# Patient Record
Sex: MALE | Race: WHITE | Employment: FULL TIME | URBAN - METROPOLITAN AREA
[De-identification: names, ages, dates, MRNs, and addresses within clinical notes are randomized per-mention and may not be internally consistent; named-entity substitution may affect disease eponyms.]

---

## 2017-06-08 ENCOUNTER — OFFICE VISIT (OUTPATIENT)
Dept: INTERNAL MEDICINE CLINIC | Age: 57
End: 2017-06-08

## 2017-06-08 VITALS
TEMPERATURE: 98.1 F | HEART RATE: 88 BPM | BODY MASS INDEX: 32.35 KG/M2 | WEIGHT: 218.4 LBS | SYSTOLIC BLOOD PRESSURE: 128 MMHG | HEIGHT: 69 IN | RESPIRATION RATE: 16 BRPM | DIASTOLIC BLOOD PRESSURE: 88 MMHG | OXYGEN SATURATION: 97 %

## 2017-06-08 DIAGNOSIS — E11.8 TYPE 2 DIABETES MELLITUS WITH COMPLICATION, WITHOUT LONG-TERM CURRENT USE OF INSULIN (HCC): Primary | ICD-10-CM

## 2017-06-08 DIAGNOSIS — R10.30 LOWER ABDOMINAL PAIN: ICD-10-CM

## 2017-06-08 DIAGNOSIS — Z12.5 PROSTATE CANCER SCREENING: ICD-10-CM

## 2017-06-08 DIAGNOSIS — N53.12 PAINFUL EJACULATION: ICD-10-CM

## 2017-06-08 DIAGNOSIS — I10 ESSENTIAL HYPERTENSION: ICD-10-CM

## 2017-06-08 DIAGNOSIS — N41.1 CHRONIC PROSTATITIS: ICD-10-CM

## 2017-06-08 RX ORDER — ASPIRIN 325 MG
81 TABLET ORAL DAILY
COMMUNITY
End: 2019-03-07

## 2017-06-08 RX ORDER — METFORMIN HYDROCHLORIDE 500 MG/1
500 TABLET ORAL 2 TIMES DAILY WITH MEALS
Qty: 180 TAB | Refills: 2 | Status: SHIPPED | OUTPATIENT
Start: 2017-06-08 | End: 2017-06-12 | Stop reason: SDUPTHER

## 2017-06-08 RX ORDER — AMLODIPINE BESYLATE 5 MG/1
5 TABLET ORAL DAILY
Status: CANCELLED | OUTPATIENT
Start: 2017-06-08

## 2017-06-08 RX ORDER — METFORMIN HYDROCHLORIDE 500 MG/1
TABLET ORAL 2 TIMES DAILY WITH MEALS
COMMUNITY
End: 2017-06-08 | Stop reason: SDUPTHER

## 2017-06-08 RX ORDER — TAMSULOSIN HYDROCHLORIDE 0.4 MG/1
0.4 CAPSULE ORAL DAILY
Qty: 30 CAP | Refills: 1 | Status: SHIPPED | OUTPATIENT
Start: 2017-06-08 | End: 2017-08-03 | Stop reason: SDUPTHER

## 2017-06-08 RX ORDER — LISINOPRIL 10 MG/1
10 TABLET ORAL DAILY
Qty: 90 TAB | Refills: 1 | Status: SHIPPED | OUTPATIENT
Start: 2017-06-08 | End: 2017-06-12 | Stop reason: SDUPTHER

## 2017-06-08 RX ORDER — CIPROFLOXACIN 500 MG/1
500 TABLET ORAL 2 TIMES DAILY
Qty: 60 TAB | Refills: 1 | Status: SHIPPED | OUTPATIENT
Start: 2017-06-08 | End: 2017-07-20

## 2017-06-08 RX ORDER — AMLODIPINE BESYLATE 5 MG/1
5 TABLET ORAL DAILY
COMMUNITY
End: 2017-07-10

## 2017-06-08 NOTE — PROGRESS NOTES
HPI  Mr. Mynor Laboy is a 61y.o. year old male, he is seen today to establish care. Has h/o DM, HTN. Complains of intermittent pain in groin area, sometimes feels scrotum is enlarged, like \"bag of fluid\". No masses noted. Sometimes penis/testicles will hurt with intercourse/ejaculation. Seems to happen R>L. Has been happening in last 2 years. Thinks he may have passed a kidney stone in past.  Sounds like he may have had a CT for this in past.    No hematuria. May also have flank pain, intermittent, both right and left . No urine hesitancy, may have frequent urination. No groin bulge. No cp or sob. Not checking glucose lately - recently was about 100. Has had spells in past of low glucose - none recently. No constipation. Says has IBS - bloating at times. Not checking bp much. No n/v/abd pain, no melena or brbpr. Last colon about 8 years ago - told \"tiny polyp\" - repeat 10 years per patient. Chief Complaint   Patient presents with   [address]     Room [address]// Dr Zehra Fried, last saw approx 1.5 year ago// Fasting // had lipids checked 1 month ago at job health screening    Diabetes     need refills, not checking Glucose at home     Hypertension        Prior to Admission medications    Medication Sig Start Date End Date Taking? Authorizing Provider   metFORMIN (GLUCOPHAGE) 500 mg tablet Take  by mouth two (2) times daily (with meals). Yes Historical Provider   amLODIPine (NORVASC) 5 mg tablet Take 5 mg by mouth daily. Yes Historical Provider   aspirin (ASPIRIN) 325 mg tablet Take 325 mg by mouth daily. Yes Historical Provider         No Known Allergies      REVIEW OF SYSTEMS:  Per HPI    PHYSICAL EXAM:  Visit Vitals    /88    Pulse 88    Temp 98.1 °F (36.7 °C) (Oral)    Resp 16    Ht 5' 9\" (1.753 m)    Wt 218 lb 6.4 oz (99.1 kg)    SpO2 97%    BMI 32.25 kg/m2     Constitutional: Appears well-developed and well-nourished. No distress.    HENT:   Head: Normocephalic and atraumatic. Eyes: No scleral icterus. Ears: tm's wnl  Mouth: OP clear without lesions, no pharyngeal exudate  Neck: no lad, no tm, supple   Cardiovascular: Normal S1/S2, regular rhythm. No murmurs, rubs, or gallops. Pulmonary/Chest: Effort normal and breath sounds normal. No respiratory distress. No wheezes, rhonchi, or rales. Abdomen: Soft, NT/ND, +BS, no rebound or guarding, no masses, no HSM appreciated. : penis circumcised, no lesions, no testicular pain or masses palpated, no inguinal hernia b/l, prostate enlarged symmetrically without tenderness or masses  Ext: No edema. Neurological: Alert. Psychiatric: Normal mood and affect. Behavior is normal.    DM foot exam: 2+ pedal pulses, no lesions, sensation intact to monofilament b/l      No results found for: NA, K, CL, CO2, AGAP, GLU, BUN, CREA, BUCR, GFRAA, GFRNA, CA, TBIL, TBILI, GPT, SGOT, AP, TP, ALB, GLOB, AGRAT, ALT  No results found for: HBA1C, HGBE8, MOX7CMOW   No results found for: CHOL, CHOLPOCT, CHOLX, CHLST, CHOLV, HDL, HDLPOC, LDL, LDLCPOC, NLDLCT, DLDL, LDLC, DLDLP, VLDLC, VLDL, TGLX, TRIGL, TRIGP, TGLPOCT, CHHD, CHHDX       ASSESSMENT/PLAN  Jamil Donovan was seen today for establish care, diabetes and hypertension. Diagnoses and all orders for this visit:    Type 2 diabetes mellitus with complication, without long-term current use of insulin (HCC)   metFORMIN (GLUCOPHAGE) 500 mg tablet; Take 1 Tab by mouth two (2) times daily (with meals). -     LIPID PANEL  -     HEMOGLOBIN A1C WITH EAG  -     CBC WITH AUTOMATED DIFF  -     AMB POC URINE, MICROALBUMIN, SEMIQUANT (3 RESULTS)  -      DIABETES FOOT EXAM  Check labs, encouraged checking glucose more regularly and bring log to follow up  Essential hypertension  -     METABOLIC PANEL, COMPREHENSIVE   lisinopril (PRINIVIL, ZESTRIL) 10 mg tablet; Take 1 Tab by mouth daily.   Add lisinopril - potential SE discussed  Prostate cancer screening  -     PROSTATE SPECIFIC AG    Painful ejaculation  Suspect due to chronic prostatitis - plan below - see urology if not improvement or worsens  BMI 32.0-32.9,adult  Goal 30 min exercise 5 days per week   Lower abdominal pain  -     UA/M W/RFLX CULTURE, ROUTINE    Chronic prostatitis  -     tamsulosin (FLOMAX) 0.4 mg capsule; Take 1 Cap by mouth daily. -     ciprofloxacin HCl (CIPRO) 500 mg tablet; Take 1 Tab by mouth two (2) times a day for 42 days. Health Maintenance Due   Topic Date Due    Hepatitis C Screening  1960    MICROALBUMIN Q1  07/16/1969    EYE EXAM RETINAL OR DILATED Q1  07/16/1969    Pneumococcal 19-64 Medium Risk (1 of 1 - PPSV23) 07/16/1978    DTaP/Tdap/Td series (1 - Tdap) 07/16/1980    FOBT Q 1 YEAR AGE 50-75  07/16/2009        Follow-up Disposition:  Return in about 1 month (around 7/8/2017) for bp. Reviewed plan of care. Patient has provided input and agrees with goals. The nurse provided the patient and/or family with advanced directive information if needed and encouraged the patient to provide a copy to the office when available.

## 2017-06-08 NOTE — MR AVS SNAPSHOT
Visit Information Date & Time Provider Department Dept. Phone Encounter #  
 6/8/2017 11:00 AM William Ruiz MD [facility] [phone] [id number] Follow-up Instructions Return in about 1 month (around 7/8/2017) for bp. Routing History Upcoming Health Maintenance Date Due Hepatitis C Screening 1960 DTaP/Tdap/Td series (1 - Tdap) 7/16/1980 FOBT Q 1 YEAR AGE 50-75 7/16/2009 INFLUENZA AGE 9 TO ADULT 8/1/2017 Allergies as of 6/8/2017  Review Complete On: 6/8/2017 By: William Ruiz MD  
 No Known Allergies Current Immunizations  Never Reviewed No immunizations on file. Not reviewed this visit You Were Diagnosed With   
  
 Codes Comments Type 2 diabetes mellitus with complication, without long-term current use of insulin (HCC)    -  Primary ICD-10-CM: E11.8 ICD-9-CM: 250.90 Essential hypertension     ICD-10-CM: I10 
ICD-9-CM: 401.9 Prostate cancer screening     ICD-10-CM: Z12.5 ICD-9-CM: V76.44 Painful ejaculation     ICD-10-CM: N53.12 
ICD-9-CM: 608.89 BMI 32.0-32.9,adult     ICD-10-CM: X19.81 
ICD-9-CM: V85.32 Lower abdominal pain     ICD-10-CM: R10.30 ICD-9-CM: 789.09 Chronic prostatitis     ICD-10-CM: N41.1 ICD-9-CM: 601.1 Vitals BP Pulse Temp Resp Height(growth percentile) Weight(growth percentile) 128/88 88 98.1 °F (36.7 °C) (Oral) 16 5' 9\" (1.753 m) 218 lb 6.4 oz (99.1 kg) SpO2 BMI Smoking Status 97% 32.25 kg/m2 Never Smoker Vitals History BMI and BSA Data Body Mass Index Body Surface Area  
 32.25 kg/m 2 2.2 m 2 Preferred Pharmacy Pharmacy Name Phone [id number] Jacquie Connor [phone] Your Updated Medication List  
  
   
This list is accurate as of: 6/8/17 11:53 AM.  Always use your most recent med list. amLODIPine 5 mg tablet Commonly known as:  Christy Fiordaliza Take 5 mg by mouth daily. aspirin 325 mg tablet Commonly known as:  ASPIRIN Take 325 mg by mouth daily. ciprofloxacin HCl 500 mg tablet Commonly known as:  CIPRO Take 1 Tab by mouth two (2) times a day for 42 days. lisinopril 10 mg tablet Commonly known as:  Melissa Barrier Take 1 Tab by mouth daily. metFORMIN 500 mg tablet Commonly known as:  GLUCOPHAGE Take 1 Tab by mouth two (2) times daily (with meals). tamsulosin 0.4 mg capsule Commonly known as:  FLOMAX Take 1 Cap by mouth daily. Prescriptions Sent to Pharmacy Refills  
 metFORMIN (GLUCOPHAGE) 500 mg tablet 2 Sig: Take 1 Tab by mouth two (2) times daily (with meals). Class: Normal  
 Pharmacy:  [address] Ph #: [phone] Route: Oral  
 lisinopril (PRINIVIL, ZESTRIL) 10 mg tablet 1 Sig: Take 1 Tab by mouth daily. Class: Normal  
 Pharmacy:  [address] Ph #: [phone] Route: Oral  
 tamsulosin (FLOMAX) 0.4 mg capsule 1 Sig: Take 1 Cap by mouth daily. Class: Normal  
 Pharmacy: [facility] #[id number] - [employer], [address] [facility] Ph #: [phone] Route: Oral  
 ciprofloxacin HCl (CIPRO) 500 mg tablet 1 Sig: Take 1 Tab by mouth two (2) times a day for 42 days. Class: Normal  
 Pharmacy: [facility]/pharmacy #[id number] - [employer], [address] [place] [address] Ph #: [phone] Route: Oral  
  
We Performed the Following AMB POC URINE, MICROALBUMIN, SEMIQUANT (3 RESULTS) [58971 CPT(R)] CBC WITH AUTOMATED DIFF [64568 CPT(R)] HEMOGLOBIN A1C WITH EAG [31044 CPT(R)]  DIABETES FOOT EXAM [7 Custom] LIPID PANEL [65254 CPT(R)] METABOLIC PANEL, COMPREHENSIVE [47795 CPT(R)] PROSTATE SPECIFIC AG (PSA) [id number] CPT(R)] UA/M W/RFLX CULTURE, ROUTINE [[id number] Custom] Follow-up Instructions Return in about 1 month (around 7/8/2017) for bp. Patient Instructions Prostatitis: Care Instructions Your Care Instructions The prostate gland is a small, walnut-shaped organ. It lies just below a man's bladder. It surrounds the urethra, the tube that carries urine through the penis and out of the body. Prostatitis is a painful condition caused by inflammation or infection of the prostate gland. Sometimes the condition is caused by bacteria, but often the cause is not known. Prostatitis caused by bacteria usually is treated with self-care and antibiotics. Follow-up care is a key part of your treatment and safety. Be sure to make and go to all appointments, and call your doctor if you are having problems. It's also a good idea to know your test results and keep a list of the medicines you take. How can you care for yourself at home? · If your doctor prescribed antibiotics, take them as directed. Do not stop taking them just because you feel better. You need to take the full course of antibiotics. · Take an over-the-counter pain medicine, such as acetaminophen (Tylenol), ibuprofen (Advil, Motrin), or naproxen (Aleve). Be safe with medicines. Read and follow all instructions on the label. · Take warm baths to help soothe pain. · Straining to pass stools can hurt when your prostate is inflamed. Avoid constipation. ¨ Include fruits, vegetables, beans, and whole grains in your diet each day. These foods are high in fiber. ¨ Drink plenty of fluids, enough so that your urine is light yellow or clear like water. If you have kidney, heart, or liver disease and have to limit fluids, talk with your doctor before you increase the amount of fluids you drink. ¨ Get some exercise every day. Build up slowly to 30 to 60 minutes a day on 5 or more days of the week. ¨ Take a fiber supplement, such as Citrucel or Metamucil, every day if needed. Read and follow all instructions on the label. ¨ Schedule time each day for a bowel movement. Having a daily routine may help. Take your time and do not strain when having a bowel movement. · Avoid alcohol, caffeine, and spicy foods, especially if they make your symptoms worse. When should you call for help? Call your doctor now or seek immediate medical care if: 
· You have symptoms of a urinary infection. For example: ¨ You have blood or pus in your urine. ¨ You have pain in your back just below your rib cage. This is called flank pain. ¨ You have a fever, chills, or body aches. ¨ It hurts to urinate. ¨ You have groin or belly pain. Watch closely for changes in your health, and be sure to contact your doctor if: 
· You have trouble starting to urinate or cannot empty your bladder completely. · You do not get better as expected. Where can you learn more? Go to http://kerrie-renny.info/. Enter E986 in the search box to learn more about \"Prostatitis: Care Instructions. \" Current as of: May 24, 2016 Content Version: 11.2 © 7154-8209 [employer]. Care instructions adapted under license by [employer] (which disclaims liability or warranty for this information). If you have questions about a medical condition or this instruction, always ask your healthcare professional. [address] any warranty or liability for your use of this information. Introducing [facility] HEALTH [facility]! [facility] introduces FraudMetrix patient portal. Now you can access parts of your medical record, email your doctor's office, and request medication refills online. 1. In your internet browser, go to [email or web address] 2. Click on the First Time User? Click Here link in the Sign In box. You will see the New Member Sign Up page. 3. Enter your [employer]udMet[employer] Access Code exactly as it appears below. You will not need to use this code after youve completed the sign-up process.  If you do not sign up before the expiration date, you must request a new code. · [email or web address] Access Code: [email or web address] Expires: 9/6/2017 11:53 AM 
 
4. Enter the last four digits of your Social Security Number (xxxx) and Date of Birth (mm/dd/yyyy) as indicated and click Submit. You will be taken to the next sign-up page. 5. Create a [email or web address] ID. This will be your [email or web address] login ID and cannot be changed, so think of one that is secure and easy to remember. 6. Create a [email or web address] password. You can change your password at any time. 7. Enter your Password Reset Question and Answer. This can be used at a later time if you forget your password. 8. Enter your e-mail address. You will receive e-mail notification when new information is available in [address] 9. Click Sign Up. You can now view and download portions of your medical record. 10. Click the Download Summary menu link to download a portable copy of your medical information. If you have questions, please visit the Frequently Asked Questions section of the [email or web address] website. Remember, [email or web address] is NOT to be used for urgent needs. For medical emergencies, dial 911. Now available from your iPhone and Android! Please provide this summary of care documentation to your next provider. Your primary care clinician is listed as Marleni Coker. If you have any questions after today's visit, please call [phone].

## 2017-06-08 NOTE — PATIENT INSTRUCTIONS
Prostatitis: Care Instructions  Your Care Instructions    The prostate gland is a small, walnut-shaped organ. It lies just below a man's bladder. It surrounds the urethra, the tube that carries urine through the penis and out of the body. Prostatitis is a painful condition caused by inflammation or infection of the prostate gland. Sometimes the condition is caused by bacteria, but often the cause is not known. Prostatitis caused by bacteria usually is treated with self-care and antibiotics. Follow-up care is a key part of your treatment and safety. Be sure to make and go to all appointments, and call your doctor if you are having problems. It's also a good idea to know your test results and keep a list of the medicines you take. How can you care for yourself at home? · If your doctor prescribed antibiotics, take them as directed. Do not stop taking them just because you feel better. You need to take the full course of antibiotics. · Take an over-the-counter pain medicine, such as acetaminophen (Tylenol), ibuprofen (Advil, Motrin), or naproxen (Aleve). Be safe with medicines. Read and follow all instructions on the label. · Take warm baths to help soothe pain. · Straining to pass stools can hurt when your prostate is inflamed. Avoid constipation. ¨ Include fruits, vegetables, beans, and whole grains in your diet each day. These foods are high in fiber. ¨ Drink plenty of fluids, enough so that your urine is light yellow or clear like water. If you have kidney, heart, or liver disease and have to limit fluids, talk with your doctor before you increase the amount of fluids you drink. ¨ Get some exercise every day. Build up slowly to 30 to 60 minutes a day on 5 or more days of the week. ¨ Take a fiber supplement, such as Citrucel or Metamucil, every day if needed. Read and follow all instructions on the label. ¨ Schedule time each day for a bowel movement. Having a daily routine may help.  Take your time and do not strain when having a bowel movement. · Avoid alcohol, caffeine, and spicy foods, especially if they make your symptoms worse. When should you call for help? Call your doctor now or seek immediate medical care if:  · You have symptoms of a urinary infection. For example:  ¨ You have blood or pus in your urine. ¨ You have pain in your back just below your rib cage. This is called flank pain. ¨ You have a fever, chills, or body aches. ¨ It hurts to urinate. ¨ You have groin or belly pain. Watch closely for changes in your health, and be sure to contact your doctor if:  · You have trouble starting to urinate or cannot empty your bladder completely. · You do not get better as expected. Where can you learn more? Go to http://kerrie-renny.info/. Enter N876 in the search box to learn more about \"Prostatitis: Care Instructions. \"  Current as of: May 24, 2016  Content Version: 11.2  © 7440-0613 [employer]. Care instructions adapted under license by [employer] (which disclaims liability or warranty for this information). If you have questions about a medical condition or this instruction, always ask your healthcare professional. [place] [address] any warranty or liability for your use of this information.

## 2017-06-09 DIAGNOSIS — E11.8 TYPE 2 DIABETES MELLITUS WITH COMPLICATION, WITHOUT LONG-TERM CURRENT USE OF INSULIN (HCC): ICD-10-CM

## 2017-06-09 DIAGNOSIS — I10 ESSENTIAL HYPERTENSION: ICD-10-CM

## 2017-06-09 LAB
ALBUMIN SERPL-MCNC: 4.9 G/DL (ref 3.5–5.5)
ALBUMIN/GLOB SERPL: 1.7 {RATIO} (ref 1.2–2.2)
ALP SERPL-CCNC: 137 IU/L (ref 39–117)
ALT SERPL-CCNC: 25 IU/L (ref 0–44)
APPEARANCE UR: CLEAR
AST SERPL-CCNC: 19 IU/L (ref 0–40)
BACTERIA #/AREA URNS HPF: NORMAL /[HPF]
BASOPHILS # BLD AUTO: 0 X10E3/UL (ref 0–0.2)
BASOPHILS NFR BLD AUTO: 0 %
BILIRUB SERPL-MCNC: 0.6 MG/DL (ref 0–1.2)
BILIRUB UR QL STRIP: NEGATIVE
BUN SERPL-MCNC: 14 MG/DL (ref 6–24)
BUN/CREAT SERPL: 18 (ref 9–20)
CALCIUM SERPL-MCNC: 9.6 MG/DL (ref 8.7–10.2)
CASTS URNS QL MICRO: NORMAL /LPF
CHLORIDE SERPL-SCNC: 98 MMOL/L (ref 96–106)
CHOLEST SERPL-MCNC: 210 MG/DL (ref 100–199)
CO2 SERPL-SCNC: 26 MMOL/L (ref 18–29)
COLOR UR: YELLOW
CREAT SERPL-MCNC: 0.78 MG/DL (ref 0.76–1.27)
EOSINOPHIL # BLD AUTO: 0.1 X10E3/UL (ref 0–0.4)
EOSINOPHIL NFR BLD AUTO: 2 %
EPI CELLS #/AREA URNS HPF: NORMAL /HPF
ERYTHROCYTE [DISTWIDTH] IN BLOOD BY AUTOMATED COUNT: 14 % (ref 12.3–15.4)
EST. AVERAGE GLUCOSE BLD GHB EST-MCNC: 169 MG/DL
GLOBULIN SER CALC-MCNC: 2.9 G/DL (ref 1.5–4.5)
GLUCOSE SERPL-MCNC: 115 MG/DL (ref 65–99)
GLUCOSE UR QL: NEGATIVE
HBA1C MFR BLD: 7.5 % (ref 4.8–5.6)
HCT VFR BLD AUTO: 49.1 % (ref 37.5–51)
HDLC SERPL-MCNC: 45 MG/DL
HGB BLD-MCNC: 16.5 G/DL (ref 12.6–17.7)
HGB UR QL STRIP: NEGATIVE
IMM GRANULOCYTES # BLD: 0 X10E3/UL (ref 0–0.1)
IMM GRANULOCYTES NFR BLD: 0 %
INTERPRETATION, 910389: NORMAL
KETONES UR QL STRIP: ABNORMAL
LDLC SERPL CALC-MCNC: 140 MG/DL (ref 0–99)
LEUKOCYTE ESTERASE UR QL STRIP: NEGATIVE
LYMPHOCYTES # BLD AUTO: 1.5 X10E3/UL (ref 0.7–3.1)
LYMPHOCYTES NFR BLD AUTO: 30 %
Lab: NORMAL
MCH RBC QN AUTO: 31.7 PG (ref 26.6–33)
MCHC RBC AUTO-ENTMCNC: 33.6 G/DL (ref 31.5–35.7)
MCV RBC AUTO: 94 FL (ref 79–97)
MICRO URNS: ABNORMAL
MICRO URNS: ABNORMAL
MONOCYTES # BLD AUTO: 0.6 X10E3/UL (ref 0.1–0.9)
MONOCYTES NFR BLD AUTO: 11 %
MUCOUS THREADS URNS QL MICRO: PRESENT
NEUTROPHILS # BLD AUTO: 2.9 X10E3/UL (ref 1.4–7)
NEUTROPHILS NFR BLD AUTO: 57 %
NITRITE UR QL STRIP: NEGATIVE
PH UR STRIP: 7 [PH] (ref 5–7.5)
PLATELET # BLD AUTO: 195 X10E3/UL (ref 150–379)
POTASSIUM SERPL-SCNC: 4.3 MMOL/L (ref 3.5–5.2)
PROT SERPL-MCNC: 7.8 G/DL (ref 6–8.5)
PROT UR QL STRIP: NEGATIVE
PSA SERPL-MCNC: 0.6 NG/ML (ref 0–4)
RBC # BLD AUTO: 5.21 X10E6/UL (ref 4.14–5.8)
RBC #/AREA URNS HPF: NORMAL /HPF
SODIUM SERPL-SCNC: 140 MMOL/L (ref 134–144)
SP GR UR: 1.02 (ref 1–1.03)
TRIGL SERPL-MCNC: 125 MG/DL (ref 0–149)
URINALYSIS REFLEX, 377202: ABNORMAL
UROBILINOGEN UR STRIP-MCNC: 0.2 MG/DL (ref 0.2–1)
VLDLC SERPL CALC-MCNC: 25 MG/DL (ref 5–40)
WBC # BLD AUTO: 5.2 X10E3/UL (ref 3.4–10.8)
WBC #/AREA URNS HPF: NORMAL /HPF

## 2017-06-09 NOTE — TELEPHONE ENCOUNTER
Pt asked us to send medication at yesterdays visit to [facility] mail order. Return fax from [facility] stating they are unable to locate pt in their system. Pt called and advised to call [facility] in order to set up his account with them. Pt in agreement. Pt states he believes Dr María Mosqueda was going to give him a Dermatology referral yesterday and wanted to confirm that. Please advise.

## 2017-06-12 RX ORDER — LISINOPRIL 10 MG/1
10 TABLET ORAL DAILY
Qty: 90 TAB | Refills: 1 | Status: SHIPPED | OUTPATIENT
Start: 2017-06-12 | End: 2017-07-10 | Stop reason: SINTOL

## 2017-06-12 RX ORDER — METFORMIN HYDROCHLORIDE 500 MG/1
1000 TABLET ORAL 2 TIMES DAILY WITH MEALS
Qty: 360 TAB | Refills: 2 | Status: SHIPPED | OUTPATIENT
Start: 2017-06-12 | End: 2018-03-09 | Stop reason: SDUPTHER

## 2017-06-12 NOTE — TELEPHONE ENCOUNTER
Tell him new dose metformin based on uncontrolled dm. a1c should be less than 7. Make sure he is seen in about 3 mos. Refer to Dr. Chelle Carballo.

## 2017-06-12 NOTE — TELEPHONE ENCOUNTER
Pt wife called and stated that pt's prescriptions didn't show up on [facility] account from 6/8. Pt would now like to have prescriptions sent to [facility] in [address] so they may get them quicker.

## 2017-07-10 ENCOUNTER — OFFICE VISIT (OUTPATIENT)
Dept: INTERNAL MEDICINE CLINIC | Age: 57
End: 2017-07-10

## 2017-07-10 VITALS
TEMPERATURE: 97.8 F | WEIGHT: 221.4 LBS | OXYGEN SATURATION: 96 % | BODY MASS INDEX: 32.79 KG/M2 | RESPIRATION RATE: 16 BRPM | DIASTOLIC BLOOD PRESSURE: 80 MMHG | HEIGHT: 69 IN | SYSTOLIC BLOOD PRESSURE: 128 MMHG | HEART RATE: 94 BPM

## 2017-07-10 DIAGNOSIS — I10 ESSENTIAL HYPERTENSION: ICD-10-CM

## 2017-07-10 DIAGNOSIS — Z11.59 ENCOUNTER FOR HEPATITIS C SCREENING TEST FOR LOW RISK PATIENT: ICD-10-CM

## 2017-07-10 DIAGNOSIS — E11.8 TYPE 2 DIABETES MELLITUS WITH COMPLICATION, WITHOUT LONG-TERM CURRENT USE OF INSULIN (HCC): Primary | ICD-10-CM

## 2017-07-10 DIAGNOSIS — N41.1 CHRONIC PROSTATITIS: ICD-10-CM

## 2017-07-10 RX ORDER — LOSARTAN POTASSIUM 25 MG/1
25 TABLET ORAL DAILY
Qty: 30 TAB | Refills: 2 | Status: SHIPPED | OUTPATIENT
Start: 2017-07-10 | End: 2017-09-05 | Stop reason: SDUPTHER

## 2017-07-10 NOTE — MR AVS SNAPSHOT
Visit Information Date & Time Provider Department Dept. Phone Encounter #  
 7/10/2017  8:45 AM Jose Madera MD [facility] [phone] [id number] Follow-up Instructions Return in about 2 months (around 9/10/2017) for bp, dm. Upcoming Health Maintenance Date Due Hepatitis C Screening 1960 MICROALBUMIN Q1 7/16/1969 EYE EXAM RETINAL OR DILATED Q1 7/16/1969 Pneumococcal 19-64 Medium Risk (1 of 1 - PPSV23) 7/16/1978 DTaP/Tdap/Td series (1 - Tdap) 7/16/1980 FOBT Q 1 YEAR AGE 50-75 7/16/2009 INFLUENZA AGE 9 TO ADULT 8/1/2017 HEMOGLOBIN A1C Q6M 12/8/2017 FOOT EXAM Q1 6/8/2018 LIPID PANEL Q1 6/8/2018 Allergies as of 7/10/2017  Review Complete On: 7/10/2017 By: Jose Madera MD  
 No Known Allergies Current Immunizations  Never Reviewed No immunizations on file. Not reviewed this visit You Were Diagnosed With   
  
 Codes Comments Type 2 diabetes mellitus with complication, without long-term current use of insulin (HCC)    -  Primary ICD-10-CM: E11.8 ICD-9-CM: 250.90 Essential hypertension     ICD-10-CM: I10 
ICD-9-CM: 401.9 Encounter for hepatitis C screening test for low risk patient     ICD-10-CM: Z11.59 
ICD-9-CM: V73.89 Chronic prostatitis     ICD-10-CM: N41.1 ICD-9-CM: 601.1 Vitals BP Pulse Temp Resp Height(growth percentile) Weight(growth percentile) 128/80 (BP 1 Location: Right arm, BP Patient Position: Sitting) 94 97.8 °F (36.6 °C) (Oral) 16 5' 9\" (1.753 m) 221 lb 6.4 oz (100.4 kg) SpO2 BMI Smoking Status 96% 32.7 kg/m2 Never Smoker Vitals History BMI and BSA Data Body Mass Index Body Surface Area 32.7 kg/m 2 2.21 m 2 Preferred Pharmacy Pharmacy Name Phone [id number] Jacquie Renee [phone] Your Updated Medication List  
  
   
 This list is accurate as of: 7/10/17  9:00 AM.  Always use your most recent med list.  
  
  
  
  
 aspirin 325 mg tablet Commonly known as:  ASPIRIN Take 325 mg by mouth daily. ciprofloxacin HCl 500 mg tablet Commonly known as:  CIPRO Take 1 Tab by mouth two (2) times a day for 42 days. losartan 25 mg tablet Commonly known as:  COZAAR Take 1 Tab by mouth daily. metFORMIN 500 mg tablet Commonly known as:  GLUCOPHAGE Take 2 Tabs by mouth two (2) times daily (with meals). tamsulosin 0.4 mg capsule Commonly known as:  FLOMAX Take 1 Cap by mouth daily. Prescriptions Sent to Pharmacy Refills  
 losartan (COZAAR) 25 mg tablet 2 Sig: Take 1 Tab by mouth daily. Class: Normal  
 Pharmacy: [facility]/pharmacy #[id number]  [place], [address] [place] [address] #: [phone] Route: Oral  
  
We Performed the Following AMB POC URINE, MICROALBUMIN, SEMIQUANT (3 RESULTS) [68940 CPT(R)] Follow-up Instructions Return in about 2 months (around 9/10/2017) for bp, dm. Introducing [facility]! Dear Génesis Childs: Thank you for requesting a [employer] account. Our records indicate that you already have an active [employer] account. You can access your account anytime at [email or web address] Did you know that you can access your hospital and ER discharge instructions at any time in [employer]ler? You can also review all of your test results from your hospital stay or ER visit. Additional Information If you have questions, please visit the Frequently Asked Questions section of the [employer] website at [email or web address] Remember, Grant[employer]ler is NOT to be used for urgent needs. For medical emergencies, dial 911. Now available from your iPhone and Android! Please provide this summary of care documentation to your next provider. Your primary care clinician is listed as Marleni Coker. If you have any questions after today's visit, please call [phone].

## 2017-07-10 NOTE — PROGRESS NOTES
HPI  Mr. Josey Wilks is a 61y.o. year old male, he is seen today for follow up HTN after addition of lisinopril, also chronic prostatitis. Symptoms of testicular pain have improved, much less frequent since being on ciprofloxacin. Notes at times has decreased libido - but not changed from spells in past.  No n/v/abd pain. No dysuria. Has checked glucose few times - has been about 100-130. Taking metformin 500mg bid. Last visit was only taking once daily. No cp or sob, no dizziness or weakness. Stopped amlodipine b/c he didn't think he needed 2 BP meds. Chief Complaint   Patient presents with    Hypertension     Room 2// NON fasting // not taking amlodipine// coughing with Lisinopril    Other     continue cipro for prostatitis?// last cipro yesterday// has not filled refill         Prior to Admission medications    Medication Sig Start Date End Date Taking? Authorizing Provider   lisinopril (PRINIVIL, ZESTRIL) 10 mg tablet Take 1 Tab by mouth daily. 6/12/17  Yes Jose Madera MD   metFORMIN (GLUCOPHAGE) 500 mg tablet Take 2 Tabs by mouth two (2) times daily (with meals). 6/12/17  Yes Jose Madera MD   aspirin (ASPIRIN) 325 mg tablet Take 325 mg by mouth daily. Yes Historical Provider   amLODIPine (NORVASC) 5 mg tablet Take 5 mg by mouth daily. Historical Provider   tamsulosin (FLOMAX) 0.4 mg capsule Take 1 Cap by mouth daily. 6/8/17   Jose Madera MD   ciprofloxacin HCl (CIPRO) 500 mg tablet Take 1 Tab by mouth two (2) times a day for 42 days. 6/8/17 7/20/17  Jose Madera MD         No Known Allergies      REVIEW OF SYSTEMS:  Per HPI    PHYSICAL EXAM:  Visit Vitals    /80 (BP 1 Location: Right arm, BP Patient Position: Sitting)    Pulse 94    Temp 97.8 °F (36.6 °C) (Oral)    Resp 16    Ht 5' 9\" (1.753 m)    Wt 221 lb 6.4 oz (100.4 kg)  Comment: steel toe boots    SpO2 96%    BMI 32.7 kg/m2     Constitutional: Appears well-developed and well-nourished.  No distress. HENT:   Head: Normocephalic and atraumatic. Eyes: No scleral icterus. Cardiovascular: Normal S1/S2, regular rhythm. No murmurs, rubs, or gallops. Pulmonary/Chest: Effort normal and breath sounds normal. No respiratory distress. No wheezes, rhonchi, or rales. Neurological: Alert. Psychiatric: Normal mood and affect. Behavior is normal.     Lab Results   Component Value Date/Time    Sodium 140 06/08/2017 02:57 PM    Potassium 4.3 06/08/2017 02:57 PM    Chloride 98 06/08/2017 02:57 PM    CO2 26 06/08/2017 02:57 PM    Glucose 115 06/08/2017 02:57 PM    BUN 14 06/08/2017 02:57 PM    Creatinine 0.78 06/08/2017 02:57 PM    BUN/Creatinine ratio 18 06/08/2017 02:57 PM    GFR est  06/08/2017 02:57 PM    GFR est non- 06/08/2017 02:57 PM    Calcium 9.6 06/08/2017 02:57 PM    Bilirubin, total 0.6 06/08/2017 02:57 PM    AST (SGOT) 19 06/08/2017 02:57 PM    Alk. phosphatase 137 06/08/2017 02:57 PM    Protein, total 7.8 06/08/2017 02:57 PM    Albumin 4.9 06/08/2017 02:57 PM    A-G Ratio 1.7 06/08/2017 02:57 PM    ALT (SGPT) 25 06/08/2017 02:57 PM     Lab Results   Component Value Date/Time    Hemoglobin A1c 7.5 06/08/2017 02:57 PM      Lab Results   Component Value Date/Time    Cholesterol, total 210 06/08/2017 02:57 PM    HDL Cholesterol 45 06/08/2017 02:57 PM    LDL, calculated 140 06/08/2017 02:57 PM    VLDL, calculated 25 06/08/2017 02:57 PM    Triglyceride 125 06/08/2017 02:57 PM          ASSESSMENT/PLAN  Morro Billings was seen today for hypertension and other. Diagnoses and all orders for this visit:    Type 2 diabetes mellitus with complication, without long-term current use of insulin (HCC)  -     AMB POC URINE, MICROALBUMIN, SEMIQUANT (3 RESULTS)  Controlled by history, continue metformin 500mg bid - poc a1c at follow up  Essential hypertension  -     losartan (COZAAR) 25 mg tablet; Take 1 Tab by mouth daily.   bp well controlled but cough with lisinopril - try losartan - bmp at follow up  Encounter for hepatitis C screening test for low risk patient  Will get hep c at follow up    Chronic prostatitis  Has 2 more weeks of cipro to complete - if symptoms don't completely resolve will need to see urology          Health Maintenance Due   Topic Date Due    Hepatitis C Screening  1960    MICROALBUMIN Q1  07/16/1969    EYE EXAM RETINAL OR DILATED Q1  07/16/1969    Pneumococcal 19-64 Medium Risk (1 of 1 - PPSV23) 07/16/1978    DTaP/Tdap/Td series (1 - Tdap) 07/16/1980    FOBT Q 1 YEAR AGE 50-75  07/16/2009        Follow-up Disposition:  Return in about 2 months (around 9/10/2017) for bp, dm. Reviewed plan of care. Patient has provided input and agrees with goals. The nurse provided the patient and/or family with advanced directive information if needed and encouraged the patient to provide a copy to the office when available.

## 2017-07-10 NOTE — PROGRESS NOTES
Patient received paperwork for advance directive during previous visit but has not completed at this time     Reviewed record In preparation for visit, huddled with provider and have obtained necessary documentation      1. Have you been to the ER, urgent care clinic since your last visit? Hospitalized since your last visit? NO    2. Have you seen or consulted any other health care providers outside of the [facility] since your last visit?    Dermatology/unsure of name     Dr Marino Beckman notified of reason for visit and vitals

## 2017-07-11 LAB
ALBUMIN UR QL STRIP: 30 MG/L
CREATININE, URINE POC: 300 MG/DL
MICROALBUMIN/CREAT RATIO POC: <30 MG/G

## 2017-08-03 DIAGNOSIS — N41.1 CHRONIC PROSTATITIS: ICD-10-CM

## 2017-08-03 RX ORDER — TAMSULOSIN HYDROCHLORIDE 0.4 MG/1
CAPSULE ORAL
Qty: 30 CAP | Refills: 1 | Status: SHIPPED | OUTPATIENT
Start: 2017-08-03 | End: 2018-05-03

## 2017-09-05 DIAGNOSIS — I10 ESSENTIAL HYPERTENSION: ICD-10-CM

## 2017-09-05 RX ORDER — LOSARTAN POTASSIUM 25 MG/1
25 TABLET ORAL DAILY
Qty: 90 TAB | Refills: 2 | Status: SHIPPED | OUTPATIENT
Start: 2017-09-05 | End: 2018-05-03 | Stop reason: SDUPTHER

## 2017-09-05 NOTE — TELEPHONE ENCOUNTER
From: Akila Calvert  To: Vasiliy Moseley MD  Sent: 9/5/2017 9:08 AM EDT  Subject: Medication Renewal Request    Original authorizing provider: MD Akila Wallace would like a refill of the following medications:  losartan (COZAAR) 25 mg tablet Vasiliy Moseley MD]    Preferred pharmacy: [id number] - [address]    Comment:  Can you send a 3 month prescription for Losartan to [facility]? He is completely out and they want him to start getting the 3 month mail prescriptions.

## 2017-12-05 ENCOUNTER — OFFICE VISIT (OUTPATIENT)
Dept: INTERNAL MEDICINE CLINIC | Age: 57
End: 2017-12-05

## 2017-12-05 ENCOUNTER — HOSPITAL ENCOUNTER (OUTPATIENT)
Dept: CT IMAGING | Age: 57
Discharge: HOME | End: 2017-12-05
Attending: INTERNAL MEDICINE
Payer: COMMERCIAL

## 2017-12-05 VITALS
OXYGEN SATURATION: 95 % | BODY MASS INDEX: 32.11 KG/M2 | WEIGHT: 216.8 LBS | DIASTOLIC BLOOD PRESSURE: 90 MMHG | SYSTOLIC BLOOD PRESSURE: 124 MMHG | HEIGHT: 69 IN | HEART RATE: 107 BPM | RESPIRATION RATE: 14 BRPM | TEMPERATURE: 98.1 F

## 2017-12-05 DIAGNOSIS — R10.13 EPIGASTRIC PAIN: ICD-10-CM

## 2017-12-05 DIAGNOSIS — R10.84 GENERALIZED ABDOMINAL PAIN: ICD-10-CM

## 2017-12-05 DIAGNOSIS — R10.32 LLQ ABDOMINAL PAIN: ICD-10-CM

## 2017-12-05 DIAGNOSIS — M54.50 ACUTE BILATERAL LOW BACK PAIN WITHOUT SCIATICA: ICD-10-CM

## 2017-12-05 DIAGNOSIS — I10 ESSENTIAL HYPERTENSION: ICD-10-CM

## 2017-12-05 DIAGNOSIS — R10.84 GENERALIZED ABDOMINAL PAIN: Primary | ICD-10-CM

## 2017-12-05 LAB
BILIRUB UR QL STRIP: NEGATIVE
CREAT BLD-MCNC: 0.8 MG/DL (ref 0.6–1.3)
GLUCOSE UR-MCNC: NEGATIVE MG/DL
KETONES P FAST UR STRIP-MCNC: NEGATIVE MG/DL
PH UR STRIP: 6 [PH] (ref 4.6–8)
PROT UR QL STRIP: NEGATIVE
SP GR UR STRIP: 1.01 (ref 1–1.03)
UA UROBILINOGEN AMB POC: NORMAL (ref 0.2–1)
URINALYSIS CLARITY POC: CLEAR
URINALYSIS COLOR POC: YELLOW
URINE BLOOD POC: NORMAL
URINE LEUKOCYTES POC: NEGATIVE
URINE NITRITES POC: NEGATIVE

## 2017-12-05 PROCEDURE — 74011636320 HC RX REV CODE- 636/320: Performed by: INTERNAL MEDICINE

## 2017-12-05 PROCEDURE — 82565 ASSAY OF CREATININE: CPT

## 2017-12-05 PROCEDURE — 74177 CT ABD & PELVIS W/CONTRAST: CPT

## 2017-12-05 PROCEDURE — 74011000255 HC RX REV CODE- 255: Performed by: INTERNAL MEDICINE

## 2017-12-05 PROCEDURE — 74011250636 HC RX REV CODE- 250/636: Performed by: INTERNAL MEDICINE

## 2017-12-05 RX ORDER — BARIUM SULFATE 20 MG/ML
900 SUSPENSION ORAL
Status: COMPLETED | OUTPATIENT
Start: 2017-12-05 | End: 2017-12-05

## 2017-12-05 RX ORDER — AMOXICILLIN AND CLAVULANATE POTASSIUM 875; 125 MG/1; MG/1
1 TABLET, FILM COATED ORAL EVERY 12 HOURS
Qty: 20 TAB | Refills: 0 | Status: SHIPPED | OUTPATIENT
Start: 2017-12-05 | End: 2017-12-15

## 2017-12-05 RX ORDER — SODIUM CHLORIDE 0.9 % (FLUSH) 0.9 %
10 SYRINGE (ML) INJECTION
Status: COMPLETED | OUTPATIENT
Start: 2017-12-05 | End: 2017-12-05

## 2017-12-05 RX ORDER — SODIUM CHLORIDE 9 MG/ML
50 INJECTION, SOLUTION INTRAVENOUS
Status: COMPLETED | OUTPATIENT
Start: 2017-12-05 | End: 2017-12-05

## 2017-12-05 RX ADMIN — Medication 10 ML: at 15:06

## 2017-12-05 RX ADMIN — SODIUM CHLORIDE 50 ML/HR: 900 INJECTION, SOLUTION INTRAVENOUS at 15:06

## 2017-12-05 RX ADMIN — IOPAMIDOL 100 ML: 755 INJECTION, SOLUTION INTRAVENOUS at 15:05

## 2017-12-05 RX ADMIN — BARIUM SULFATE 900 ML: 21 SUSPENSION ORAL at 15:06

## 2017-12-05 NOTE — LETTER
NOTIFICATION RETURN TO WORK / SCHOOL 
 
12/5/2017 11:48 AM 
 
Mr. Lelia Hardwick [address] [phone] To Whom It May Concern: 
 
Lelia Hardwick is currently under the care of [facility]. He will return to work/school on 12/8/17. If there are questions or concerns please have the patient contact our office. Sincerely, Patience Collado MD

## 2017-12-05 NOTE — MR AVS SNAPSHOT
Visit Information Date & Time Provider Department Dept. Phone Encounter #  
 12/5/2017 10:45 AM MD Chioma Cameron [phone] [id number] Follow-up Instructions Return if symptoms worsen or fail to improve. Your Appointments 1/3/2018  1:00 PM  
ROUTINE CARE with MD Chioma Cameron [facility]) Appt Note: Bp Dm $20cp 07.10.17; Bp Dm $20cp - r/s from pcp out 9/26AM; Bp Dm $20cp - r/s from pcp out 9/26AM - pt was called 9/27 to verify 9/28 appt and stated thought wife r/s appt to 1/3 for routine f/u  
 [address] [facility] [address] [phone]  
  
   
 [address]  Upcoming Health Maintenance Date Due Hepatitis C Screening 1960 EYE EXAM RETINAL OR DILATED Q1 7/16/1969 DTaP/Tdap/Td series (1 - Tdap) 7/16/1980 FOBT Q 1 YEAR AGE 50-75 7/16/2009 HEMOGLOBIN A1C Q6M 12/8/2017 FOOT EXAM Q1 6/8/2018 LIPID PANEL Q1 6/8/2018 MICROALBUMIN Q1 7/11/2018 Allergies as of 12/5/2017  Review Complete On: 12/5/2017 By: Valentin Katz MD  
 No Known Allergies Current Immunizations  Never Reviewed No immunizations on file. Not reviewed this visit You Were Diagnosed With   
  
 Codes Comments Generalized abdominal pain    -  Primary ICD-10-CM: R10.84 ICD-9-CM: 789.07 Essential hypertension     ICD-10-CM: I10 
ICD-9-CM: 401.9 Acute bilateral low back pain without sciatica     ICD-10-CM: M54.5 ICD-9-CM: 724.2, 338.19 Vitals BP Pulse Temp Resp Height(growth percentile) Weight(growth percentile) 124/90 (!) 107 98.1 °F (36.7 °C) (Oral) 14 5' 9\" (1.753 m) 216 lb 12.8 oz (98.3 kg) SpO2 BMI Smoking Status 95% 32.02 kg/m2 Never Smoker Vitals History BMI and BSA Data Body Mass Index Body Surface Area 32.02 kg/m 2 2.19 m 2 Preferred Pharmacy Pharmacy Name Phone [facility] #[id number] Jacquie Stein Arnoldo [phone] Your Updated Medication List  
  
   
This list is accurate as of: 12/5/17 11:48 AM.  Always use your most recent med list.  
  
  
  
  
 amoxicillin-clavulanate 875-125 mg per tablet Commonly known as:  AUGMENTIN Take 1 Tab by mouth every twelve (12) hours for 10 days. aspirin 325 mg tablet Commonly known as:  ASPIRIN Take 325 mg by mouth daily. losartan 25 mg tablet Commonly known as:  COZAAR Take 1 Tab by mouth daily. metFORMIN 500 mg tablet Commonly known as:  GLUCOPHAGE Take 2 Tabs by mouth two (2) times daily (with meals). tamsulosin 0.4 mg capsule Commonly known as:  FLOMAX TAKE 1 CAPSULE BY MOUTH DAILY. Prescriptions Sent to Pharmacy Refills  
 amoxicillin-clavulanate (AUGMENTIN) 875-125 mg per tablet 0 Sig: Take 1 Tab by mouth every twelve (12) hours for 10 days. Class: Normal  
 [id number] [address] Ph #: [phone] Route: Oral  
  
We Performed the Following AMB POC URINALYSIS DIP STICK AUTO W/O MICRO [18924 CPT(R)] CBC WITH AUTOMATED DIFF [29708 CPT(R)] LIPASE [id number] CPT(R)] METABOLIC PANEL, COMPREHENSIVE [63640 CPT(R)] UA/M W/RFLX CULTURE, ROUTINE [[id number] Custom] Follow-up Instructions Return if symptoms worsen or fail to improve. To-Do List   
 12/05/2017 Imaging:  CT ABD PELV WO CONT Patient Instructions Diverticulitis: Care Instructions Your Care Instructions Diverticulitis occurs when pouches form in the wall of the colon and become inflamed or infected. It can be very painful. Doctors aren't sure what causes diverticulitis. There is no proof that foods such as nuts, seeds, or berries cause it or make it worse. A low-fiber diet may cause the colon to work harder to push stool forward. Pouches may form because of this extra work. It may be hard to think about healthy eating while you're in pain. But as you recover, you might think about how you can use healthy eating for overall better health. Healthy eating may help you avoid future attacks. Follow-up care is a key part of your treatment and safety. Be sure to make and go to all appointments, and call your doctor if you are having problems. It's also a good idea to know your test results and keep a list of the medicines you take. How can you care for yourself at home? · Drink plenty of fluids, enough so that your urine is light yellow or clear like water. If you have kidney, heart, or liver disease and have to limit fluids, talk with your doctor before you increase the amount of fluids you drink. · Stick to liquids or a bland diet (plain rice, bananas, dry toast or crackers, applesauce) until you are feeling better. Then you can return to regular foods and gradually increase the amount of fiber in your diet. · Use a heating pad set on low on your belly to relieve mild cramps and pain. · Get extra rest until you are feeling better. · Be safe with medicines. Read and follow all instructions on the label. ¨ If the doctor gave you a prescription medicine for pain, take it as prescribed. ¨ If you are not taking a prescription pain medicine, ask your doctor if you can take an over-the-counter medicine. · If your doctor prescribed antibiotics, take them as directed. Do not stop taking them just because you feel better. You need to take the full course of antibiotics. To prevent future attacks of diverticulitis · Avoid constipation: 
¨ Include fruits, vegetables, beans, and whole grains in your diet each day. These foods are high in fiber. ¨ Drink plenty of fluids, enough so that your urine is light yellow or clear like water.  If you have kidney, heart, or liver disease and have to limit fluids, talk with your doctor before you increase the amount of fluids you drink. ¨ Get some exercise every day. Build up slowly to 30 to 60 minutes a day on 5 or more days of the week. ¨ Take a fiber supplement, such as Citrucel or Metamucil, every day if needed. Read and follow all instructions on the label. ¨ Schedule time each day for a bowel movement. Having a daily routine may help. Take your time and do not strain when having a bowel movement. When should you call for help? Call your doctor now or seek immediate medical care if: 
? · You have a fever. ? · You are vomiting. ? · You have new or worse belly pain. ? · You cannot pass stools or gas. ? Watch closely for changes in your health, and be sure to contact your doctor if you have any problems. Where can you learn more? Go to http://kerrie-renny.info/. Enter H901 in the search box to learn more about \"Diverticulitis: Care Instructions. \" Current as of: May 12, 2017 Content Version: 11.4 © 8565-3213 Tab[employer]. Care instructions adapted under license by [employer] (which disclaims liability or warranty for this information). If you have questions about a medical condition or this instruction, always ask your healthcare professional. [address] any warranty or liability for your use of this information. Introducing [facility]! Dear Erica Flower: Thank you for requesting a [facility] account. Our records indicate that you already have an active [facility] account. You can access your account anytime at [email or web address] Did you know that you can access your hospital and ER discharge instructions at any time in [facility]? You can also review all of your test results from your hospital stay or ER visit. Additional Information If you have questions, please visit the Frequently Asked Questions section of the [employer] website at [email or web address] Remember, [employer] is NOT to be used for urgent needs. For medical emergencies, dial 911. Now available from your iPhone and Android! Please provide this summary of care documentation to your next provider. Your primary care clinician is listed as Marleni Coker. If you have any questions after today's visit, please call [phone].

## 2017-12-05 NOTE — PATIENT INSTRUCTIONS
Diverticulitis: Care Instructions  Your Care Instructions    Diverticulitis occurs when pouches form in the wall of the colon and become inflamed or infected. It can be very painful. Doctors aren't sure what causes diverticulitis. There is no proof that foods such as nuts, seeds, or berries cause it or make it worse. A low-fiber diet may cause the colon to work harder to push stool forward. Pouches may form because of this extra work. It may be hard to think about healthy eating while you're in pain. But as you recover, you might think about how you can use healthy eating for overall better health. Healthy eating may help you avoid future attacks. Follow-up care is a key part of your treatment and safety. Be sure to make and go to all appointments, and call your doctor if you are having problems. It's also a good idea to know your test results and keep a list of the medicines you take. How can you care for yourself at home? · Drink plenty of fluids, enough so that your urine is light yellow or clear like water. If you have kidney, heart, or liver disease and have to limit fluids, talk with your doctor before you increase the amount of fluids you drink. · Stick to liquids or a bland diet (plain rice, bananas, dry toast or crackers, applesauce) until you are feeling better. Then you can return to regular foods and gradually increase the amount of fiber in your diet. · Use a heating pad set on low on your belly to relieve mild cramps and pain. · Get extra rest until you are feeling better. · Be safe with medicines. Read and follow all instructions on the label. ¨ If the doctor gave you a prescription medicine for pain, take it as prescribed. ¨ If you are not taking a prescription pain medicine, ask your doctor if you can take an over-the-counter medicine. · If your doctor prescribed antibiotics, take them as directed. Do not stop taking them just because you feel better.  You need to take the full course of antibiotics. To prevent future attacks of diverticulitis  · Avoid constipation:  ¨ Include fruits, vegetables, beans, and whole grains in your diet each day. These foods are high in fiber. ¨ Drink plenty of fluids, enough so that your urine is light yellow or clear like water. If you have kidney, heart, or liver disease and have to limit fluids, talk with your doctor before you increase the amount of fluids you drink. ¨ Get some exercise every day. Build up slowly to 30 to 60 minutes a day on 5 or more days of the week. ¨ Take a fiber supplement, such as Citrucel or Metamucil, every day if needed. Read and follow all instructions on the label. ¨ Schedule time each day for a bowel movement. Having a daily routine may help. Take your time and do not strain when having a bowel movement. When should you call for help? Call your doctor now or seek immediate medical care if:  ? · You have a fever. ? · You are vomiting. ? · You have new or worse belly pain. ? · You cannot pass stools or gas. ? Watch closely for changes in your health, and be sure to contact your doctor if you have any problems. Where can you learn more? Go to http://kerrie-renny.info/. Enter H901 in the search box to learn more about \"Diverticulitis: Care Instructions. \"  Current as of: May 12, 2017  Content Version: 11.4  © 1713-6144 [employer]. Care instructions adapted under license by [employer] (which disclaims liability or warranty for this information). If you have questions about a medical condition or this instruction, always ask your healthcare professional. [address] [address] any warranty or liability for your use of this information.

## 2017-12-05 NOTE — PROGRESS NOTES
[facility]  Identified pt with two pt identifiers(name and ). Chief Complaint   Patient presents with    Abdominal Pain     Room 1// constant pain began slightly on Fri evening, got worse x last 2 nights     Fatigue     and night sweats x 2 nights // able to eat and drink        1. Have you been to the ER, urgent care clinic since your last visit? Hospitalized since your last visit? NO    2. Have you seen or consulted any other health care providers outside of the [address] [place] [address] since your last visit? Include any pap smears or colon screening. NO      Dr Lana Goss notified of reason for visit and vitals    Patient received paperwork for advance directive during previous visit but has not completed at this time     Reviewed record In preparation for visit, huddled with provider and have obtained necessary documentation      Health Maintenance Due   Topic    Hepatitis C Screening     EYE EXAM RETINAL OR DILATED Q1     Pneumococcal 19-64 Medium Risk (1 of 1 - PPSV23)    DTaP/Tdap/Td series (1 - Tdap)    FOBT Q 1 YEAR AGE 54-65     Influenza Age 5 to Adult     HEMOGLOBIN A1C Q6M        Wt Readings from Last 3 Encounters:   17 216 lb 12.8 oz (98.3 kg)   07/10/17 221 lb 6.4 oz (100.4 kg)   17 218 lb 6.4 oz (99.1 kg)     Temp Readings from Last 3 Encounters:   07/10/17 97.8 °F (36.6 °C) (Oral)   17 98.1 °F (36.7 °C) (Oral)     BP Readings from Last 3 Encounters:   07/10/17 128/80   17 128/88     Pulse Readings from Last 3 Encounters:   07/10/17 94   17 88         Learning Assessment:  :     No flowsheet data found. Depression Screening:  :     No flowsheet data found. Fall Risk Assessment:  :     No flowsheet data found. Abuse Screening:  :     No flowsheet data found. I have received verbal consent from [facility] to discuss any/all medical information while they are present in the room.     Medication reconciliation up to date and corrected with patient at this time.

## 2017-12-05 NOTE — PROGRESS NOTES
HPI  Mr. Mitchell Allred is a 61y.o. year old male, he is seen today for abdominal pain. New abdominal pain. Pain in epigastric region radiating down to b/l lower abdomen. Pain started about 4 days ago, comes in waves. Pain is sharp in nature and also muscles feel sore. No n/v. Now has mild pain lower back b/l. No diarrhea or constipation. Not associated with food. No urinary frequency or urgency. No blood in urine. Last few nights had sweats due to pain. Spells may last few minutes. Pain seems to have settled in left lower abdomen and currently in suprapubic region. No melena or brbpr. Has been on elavil in pats for IBS which helped IBS spasms and pain. No indigestion or gerd symptoms. Pain is debilitating at times causing him to double over. Hasn't been checking glucose lately - had been about 130-140 when he checked. Chief Complaint   Patient presents with    Abdominal Pain     Room 1// constant pain began slightly on Fri evening, got worse x last 2 nights // took ASA yesterday for the pain     Fatigue     and night sweats x 2 nights // able to eat and drink // NON fasting // normal BM and urination     Other     eye exam 10/2017 w/ [facility] in [place], colonoscopy x \"about 10 years\" Giuliano Davis         Prior to Admission medications    Medication Sig Start Date End Date Taking? Authorizing Provider   amoxicillin-clavulanate (AUGMENTIN) 875-125 mg per tablet Take 1 Tab by mouth every twelve (12) hours for 10 days. 12/5/17 12/15/17 Yes Matthew Sepulveda MD   losartan (COZAAR) 25 mg tablet Take 1 Tab by mouth daily. 9/5/17  Yes Matthew Sepulveda MD   tamsulosin (FLOMAX) 0.4 mg capsule TAKE 1 CAPSULE BY MOUTH DAILY. 8/3/17  Yes Matthew Sepulveda MD   metFORMIN (GLUCOPHAGE) 500 mg tablet Take 2 Tabs by mouth two (2) times daily (with meals). 6/12/17  Yes Matthew Sepulveda MD   aspirin (ASPIRIN) 325 mg tablet Take 325 mg by mouth daily.    Yes Historical Provider         No Known Allergies      REVIEW OF SYSTEMS:  Per HPI    PHYSICAL EXAM:  Visit Vitals    /90    Pulse (!) 107    Temp 98.1 °F (36.7 °C) (Oral)    Resp 14    Ht 5' 9\" (1.753 m)    Wt 216 lb 12.8 oz (98.3 kg)    SpO2 95%    BMI 32.02 kg/m2     Constitutional: Appears well-developed and well-nourished. No distress. HENT:   Head: Normocephalic and atraumatic. Eyes: No scleral icterus. Cardiovascular: Normal S1/S2, regular rhythm. No murmurs, rubs, or gallops. Pulmonary/Chest: Effort normal and breath sounds normal. No respiratory distress. No wheezes, rhonchi, or rales. Abdomen: Soft, +tenderness llq and luq abdominal pain/ND, +BS, no rebound or guarding, no masses, no HSM appreciated. Back: no pain on palpation of spine, no CVA tenderness, mild tenderness b/l lumbar paraspinal muscles  Ext: No edema. Neurological: Alert. Psychiatric: Normal mood and affect. Behavior is normal.     Lab Results   Component Value Date/Time    Sodium 140 06/08/2017 02:57 PM    Potassium 4.3 06/08/2017 02:57 PM    Chloride 98 06/08/2017 02:57 PM    CO2 26 06/08/2017 02:57 PM    Glucose 115 06/08/2017 02:57 PM    BUN 14 06/08/2017 02:57 PM    Creatinine 0.78 06/08/2017 02:57 PM    BUN/Creatinine ratio 18 06/08/2017 02:57 PM    GFR est  06/08/2017 02:57 PM    GFR est non- 06/08/2017 02:57 PM    Calcium 9.6 06/08/2017 02:57 PM    Bilirubin, total 0.6 06/08/2017 02:57 PM    AST (SGOT) 19 06/08/2017 02:57 PM    Alk.  phosphatase 137 06/08/2017 02:57 PM    Protein, total 7.8 06/08/2017 02:57 PM    Albumin 4.9 06/08/2017 02:57 PM    A-G Ratio 1.7 06/08/2017 02:57 PM    ALT (SGPT) 25 06/08/2017 02:57 PM     Lab Results   Component Value Date/Time    Hemoglobin A1c 7.5 06/08/2017 02:57 PM      Lab Results   Component Value Date/Time    Cholesterol, total 210 06/08/2017 02:57 PM    HDL Cholesterol 45 06/08/2017 02:57 PM    LDL, calculated 140 06/08/2017 02:57 PM    VLDL, calculated 25 06/08/2017 02:57 PM    Triglyceride 125 06/08/2017 02:57 PM          ASSESSMENT/PLAN  Diagnoses and all orders for this visit:    1. Generalized abdominal pain  -     AMB POC URINALYSIS DIP STICK AUTO W/O MICRO  -     UA/M W/RFLX CULTURE, ROUTINE  -     METABOLIC PANEL, COMPREHENSIVE  -     LIPASE  -     CBC WITH AUTOMATED DIFF  -     CT ABD PELV WO CONT; Future  -     amoxicillin-clavulanate (AUGMENTIN) 875-125 mg per tablet; Take 1 Tab by mouth every twelve (12) hours for 10 days. -     CT ABD PELV W CONT; Future  High suspicion for diverticulitis - treat as above but given symptoms and exam needs CT abd/pelvis today - to ED if pain worsens  Differential also includes pancreatitis but less likely, also in differential is PUD  2. Essential hypertension  Slightly high - in pain - no changes today  3. Acute bilateral low back pain without sciatica    4. LLQ abdominal pain  -     CT ABD PELV W CONT; Future    5. Epigastric pain  -     CT ABD PELV W CONT; Future          Health Maintenance Due   Topic Date Due    Hepatitis C Screening  1960    EYE EXAM RETINAL OR DILATED Q1  07/16/1969    DTaP/Tdap/Td series (1 - Tdap) 07/16/1980    FOBT Q 1 YEAR AGE 50-75  07/16/2009    HEMOGLOBIN A1C Q6M  12/08/2017        Follow-up Disposition:  Return if symptoms worsen or fail to improve. Reviewed plan of care. Patient has provided input and agrees with goals. The nurse provided the patient and/or family with advanced directive information if needed and encouraged the patient to provide a copy to the office when available.

## 2017-12-06 LAB
ALBUMIN SERPL-MCNC: 4.5 G/DL (ref 3.5–5.5)
ALBUMIN/GLOB SERPL: 1.7 {RATIO} (ref 1.2–2.2)
ALP SERPL-CCNC: 113 IU/L (ref 39–117)
ALT SERPL-CCNC: 18 IU/L (ref 0–44)
APPEARANCE UR: CLEAR
AST SERPL-CCNC: 14 IU/L (ref 0–40)
BACTERIA #/AREA URNS HPF: NORMAL /[HPF]
BASOPHILS # BLD AUTO: 0 X10E3/UL (ref 0–0.2)
BASOPHILS NFR BLD AUTO: 0 %
BILIRUB SERPL-MCNC: 0.7 MG/DL (ref 0–1.2)
BILIRUB UR QL STRIP: NEGATIVE
BUN SERPL-MCNC: 11 MG/DL (ref 6–24)
BUN/CREAT SERPL: 12 (ref 9–20)
CALCIUM SERPL-MCNC: 9.5 MG/DL (ref 8.7–10.2)
CASTS URNS QL MICRO: NORMAL /LPF
CHLORIDE SERPL-SCNC: 98 MMOL/L (ref 96–106)
CO2 SERPL-SCNC: 27 MMOL/L (ref 18–29)
COLOR UR: YELLOW
CREAT SERPL-MCNC: 0.95 MG/DL (ref 0.76–1.27)
EOSINOPHIL # BLD AUTO: 0.1 X10E3/UL (ref 0–0.4)
EOSINOPHIL NFR BLD AUTO: 1 %
EPI CELLS #/AREA URNS HPF: NORMAL /HPF
ERYTHROCYTE [DISTWIDTH] IN BLOOD BY AUTOMATED COUNT: 14.1 % (ref 12.3–15.4)
GFR SERPLBLD CREATININE-BSD FMLA CKD-EPI: 102 ML/MIN/1.73
GFR SERPLBLD CREATININE-BSD FMLA CKD-EPI: 88 ML/MIN/1.73
GLOBULIN SER CALC-MCNC: 2.6 G/DL (ref 1.5–4.5)
GLUCOSE SERPL-MCNC: 122 MG/DL (ref 65–99)
GLUCOSE UR QL: NEGATIVE
HCT VFR BLD AUTO: 46.8 % (ref 37.5–51)
HGB BLD-MCNC: 16 G/DL (ref 13–17.7)
HGB UR QL STRIP: NEGATIVE
IMM GRANULOCYTES # BLD: 0 X10E3/UL (ref 0–0.1)
IMM GRANULOCYTES NFR BLD: 0 %
KETONES UR QL STRIP: NEGATIVE
LEUKOCYTE ESTERASE UR QL STRIP: NEGATIVE
LIPASE SERPL-CCNC: 36 U/L (ref 13–78)
LYMPHOCYTES # BLD AUTO: 1.5 X10E3/UL (ref 0.7–3.1)
LYMPHOCYTES NFR BLD AUTO: 14 %
MCH RBC QN AUTO: 32 PG (ref 26.6–33)
MCHC RBC AUTO-ENTMCNC: 34.2 G/DL (ref 31.5–35.7)
MCV RBC AUTO: 94 FL (ref 79–97)
MICRO URNS: NORMAL
MICRO URNS: NORMAL
MONOCYTES # BLD AUTO: 1.3 X10E3/UL (ref 0.1–0.9)
MONOCYTES NFR BLD AUTO: 13 %
MUCOUS THREADS URNS QL MICRO: PRESENT
NEUTROPHILS # BLD AUTO: 7.5 X10E3/UL (ref 1.4–7)
NEUTROPHILS NFR BLD AUTO: 72 %
NITRITE UR QL STRIP: NEGATIVE
PH UR STRIP: 6.5 [PH] (ref 5–7.5)
PLATELET # BLD AUTO: 203 X10E3/UL (ref 150–379)
POTASSIUM SERPL-SCNC: 5 MMOL/L (ref 3.5–5.2)
PROT SERPL-MCNC: 7.1 G/DL (ref 6–8.5)
PROT UR QL STRIP: NEGATIVE
RBC # BLD AUTO: 5 X10E6/UL (ref 4.14–5.8)
RBC #/AREA URNS HPF: NORMAL /HPF
SODIUM SERPL-SCNC: 140 MMOL/L (ref 134–144)
SP GR UR: 1.02 (ref 1–1.03)
URINALYSIS REFLEX, 377202: NORMAL
UROBILINOGEN UR STRIP-MCNC: 0.2 MG/DL (ref 0.2–1)
WBC # BLD AUTO: 10.4 X10E3/UL (ref 3.4–10.8)
WBC #/AREA URNS HPF: NORMAL /HPF

## 2018-03-09 DIAGNOSIS — E11.8 TYPE 2 DIABETES MELLITUS WITH COMPLICATION, WITHOUT LONG-TERM CURRENT USE OF INSULIN (HCC): ICD-10-CM

## 2018-03-09 RX ORDER — METFORMIN HYDROCHLORIDE 500 MG/1
1000 TABLET ORAL 2 TIMES DAILY WITH MEALS
Qty: 360 TAB | Refills: 2 | Status: SHIPPED | OUTPATIENT
Start: 2018-03-09 | End: 2018-05-03 | Stop reason: SDUPTHER

## 2018-04-30 ENCOUNTER — TELEPHONE (OUTPATIENT)
Dept: INTERNAL MEDICINE CLINIC | Facility: CLINIC | Age: 58
End: 2018-04-30

## 2018-04-30 RX ORDER — AMITRIPTYLINE HYDROCHLORIDE 25 MG/1
25 TABLET, FILM COATED ORAL
Qty: 90 TAB | Refills: 3 | Status: SHIPPED | OUTPATIENT
Start: 2018-04-30 | End: 2019-04-21 | Stop reason: SDUPTHER

## 2018-04-30 NOTE — TELEPHONE ENCOUNTER
----- Message from Maria Victoria Maldonado sent at 4/27/2018  2:27 PM EDT -----  Regarding: Prescription Question  Contact: [phone]  Did you get my email about the amitriptyline 25 MG.  for 90 days to the [facility]   I haven't heard anything? ?

## 2018-05-03 ENCOUNTER — OFFICE VISIT (OUTPATIENT)
Dept: INTERNAL MEDICINE CLINIC | Facility: CLINIC | Age: 58
End: 2018-05-03

## 2018-05-03 VITALS
RESPIRATION RATE: 16 BRPM | DIASTOLIC BLOOD PRESSURE: 80 MMHG | HEART RATE: 94 BPM | WEIGHT: 216 LBS | OXYGEN SATURATION: 96 % | SYSTOLIC BLOOD PRESSURE: 134 MMHG | BODY MASS INDEX: 31.99 KG/M2 | HEIGHT: 69 IN | TEMPERATURE: 98 F

## 2018-05-03 DIAGNOSIS — I10 ESSENTIAL HYPERTENSION: ICD-10-CM

## 2018-05-03 DIAGNOSIS — R35.0 URINARY FREQUENCY: ICD-10-CM

## 2018-05-03 DIAGNOSIS — Z00.00 WELL ADULT EXAM: Primary | ICD-10-CM

## 2018-05-03 DIAGNOSIS — E11.8 TYPE 2 DIABETES MELLITUS WITH COMPLICATION, WITHOUT LONG-TERM CURRENT USE OF INSULIN (HCC): ICD-10-CM

## 2018-05-03 DIAGNOSIS — Z11.59 ENCOUNTER FOR HEPATITIS C SCREENING TEST FOR LOW RISK PATIENT: ICD-10-CM

## 2018-05-03 DIAGNOSIS — Z12.5 PROSTATE CANCER SCREENING: ICD-10-CM

## 2018-05-03 RX ORDER — METFORMIN HYDROCHLORIDE 500 MG/1
1000 TABLET ORAL 2 TIMES DAILY WITH MEALS
Qty: 360 TAB | Refills: 2 | Status: SHIPPED | OUTPATIENT
Start: 2018-05-03 | End: 2019-02-20 | Stop reason: SDUPTHER

## 2018-05-03 RX ORDER — LOSARTAN POTASSIUM 25 MG/1
25 TABLET ORAL DAILY
Qty: 90 TAB | Refills: 2 | Status: SHIPPED | OUTPATIENT
Start: 2018-05-03 | End: 2019-02-15 | Stop reason: SDUPTHER

## 2018-05-03 NOTE — PROGRESS NOTES
HPI  Mr. Chris Dumont is a 61y.o. year old male, he is seen today for well exam, follow up DM, HTN, IBS. Will be starting new job at [employer] in [address] next week. Will be closer to home, less travel. Has been well since last visit. No more abdominal pain since being for divertitculitis with augmentin. Just started elavil for IBS which he has been on in past. Has frequent loose stools and abdominal cramping with IBS. Hasn't been checking glucose lately. No dizziness, lightheadedness, chest pain or sob. Walks some for exercise, no intentional exercise. Plans to be more active with new job. Denies increase in urinary frequency, urgency or difficulty emptying bladder. No change in bowels, melena, brbpr. BP well controlled when checks at home - 120s/80s. Chief Complaint   Patient presents with    Well Male     Room 2A// fasting // had a stigmatism corrected last week // Colonoscopy up to date, due this year ([address])    Other     dry mouth x a few days, started amitriptyline this week         Prior to Admission medications    Medication Sig Start Date End Date Taking? Authorizing Provider   amitriptyline (ELAVIL) 25 mg tablet Take 1 Tab by mouth nightly. 4/30/18  Yes Kayli Ko MD   metFORMIN (GLUCOPHAGE) 500 mg tablet Take 2 Tabs by mouth two (2) times daily (with meals). 3/9/18  Yes Kayli Ko MD   losartan (COZAAR) 25 mg tablet Take 1 Tab by mouth daily. 9/5/17  Yes Kayli Ko MD   aspirin (ASPIRIN) 325 mg tablet Take 325 mg by mouth daily. Yes Historical Provider   tamsulosin (FLOMAX) 0.4 mg capsule TAKE 1 CAPSULE BY MOUTH DAILY.  8/3/17   Kayli Ko MD         No Known Allergies      REVIEW OF SYSTEMS:  Per HPI    PHYSICAL EXAM:  Visit Vitals    /80    Pulse 94    Temp 98 °F (36.7 °C) (Oral)    Resp 16    Ht 5' 9\" (1.753 m)    Wt 216 lb (98 kg)    SpO2 96%    BMI 31.9 kg/m2     Constitutional: Appears well-developed and well-nourished. No distress. HENT:   Head: Normocephalic and atraumatic. Eyes: No scleral icterus. Neck: no lad, no tm, supple   Cardiovascular: Normal S1/S2, regular rhythm. No murmurs, rubs, or gallops. Pulmonary/Chest: Effort normal and breath sounds normal. No respiratory distress. No wheezes, rhonchi, or rales. Abdomen: Soft, NT/ND, +BS, no rebound or guarding, no masses, no HSM appreciated. Rectal: normal tone, prostate smooth, nontender, no nodules, not enlarged, no rectal masses   Ext: No edema. Neurological: Alert. Psychiatric: Normal mood and affect. Behavior is normal.     Lab Results   Component Value Date/Time    Sodium 140 12/05/2017 12:05 PM    Potassium 5.0 12/05/2017 12:05 PM    Chloride 98 12/05/2017 12:05 PM    CO2 27 12/05/2017 12:05 PM    Glucose 122 (H) 12/05/2017 12:05 PM    BUN 11 12/05/2017 12:05 PM    Creatinine 0.95 12/05/2017 12:05 PM    BUN/Creatinine ratio 12 12/05/2017 12:05 PM    GFR est  12/05/2017 12:05 PM    GFR est non-AA 88 12/05/2017 12:05 PM    Calcium 9.5 12/05/2017 12:05 PM    Bilirubin, total 0.7 12/05/2017 12:05 PM    AST (SGOT) 14 12/05/2017 12:05 PM    Alk. phosphatase 113 12/05/2017 12:05 PM    Protein, total 7.1 12/05/2017 12:05 PM    Albumin 4.5 12/05/2017 12:05 PM    A-G Ratio 1.7 12/05/2017 12:05 PM    ALT (SGPT) 18 12/05/2017 12:05 PM     Lab Results   Component Value Date/Time    Hemoglobin A1c 7.5 (H) 06/08/2017 02:57 PM      Lab Results   Component Value Date/Time    Cholesterol, total 210 (H) 06/08/2017 02:57 PM    HDL Cholesterol 45 06/08/2017 02:57 PM    LDL, calculated 140 (H) 06/08/2017 02:57 PM    VLDL, calculated 25 06/08/2017 02:57 PM    Triglyceride 125 06/08/2017 02:57 PM          ASSESSMENT/PLAN  Diagnoses and all orders for this visit:    1.  Well adult exam  -     LIPID PANEL  -     METABOLIC PANEL, COMPREHENSIVE  Encouraged increased exercise - goal 30 min 5 days per week  Patient to schedule colonoscopy    In addition to well exam patient seen for the following problems:    2. BMI 31.0-31.9,adult  See above  3. Type 2 diabetes mellitus with complication, without long-term current use of insulin (HCC)  -     HEMOGLOBIN A1C WITH EAG  -     metFORMIN (GLUCOPHAGE) 500 mg tablet; Take 2 Tabs by mouth two (2) times daily (with meals). Advised checking glucose more regularly  4. Essential hypertension  -     losartan (COZAAR) 25 mg tablet; Take 1 Tab by mouth daily. Controlled on current regimen, continue   5. Encounter for hepatitis C screening test for low risk patient  -     HEPATITIS C AB    6. Prostate cancer screening  BRIAN normal   7. Urinary frequency  Resolved       Health Maintenance Due   Topic Date Due    Hepatitis C Screening  1960    DTaP/Tdap/Td series (1 - Tdap) 07/16/1980    HEMOGLOBIN A1C Q6M  12/08/2017        Follow-up Disposition:  Return in about 6 months (around 11/3/2018) for bp, chol, dm. Reviewed plan of care. Patient has provided input and agrees with goals. The nurse provided the patient and/or family with advanced directive information if needed and encouraged the patient to provide a copy to the office when available.

## 2018-05-03 NOTE — PROGRESS NOTES
Yamile Mullen  Identified pt with two pt identifiers(name and ). Chief Complaint   Patient presents with    Well Male     Room 2A// fasting // had a stigmatism corrected last week // Colonoscopy up to date, due this year ([address])    Other     dry mouth x a few days, started amitriptyline this week        1. Have you been to the ER, urgent care clinic since your last visit? Hospitalized since your last visit? NO    2. Have you seen or consulted any other health care providers outside of the [address] [place] [address] since your last visit? Include any pap smears or colon screening. NO       [address] [place] [address] notified of reason for visit, vitals and flowsheets obtained on patients. Patient received paperwork for advance directive during previous visit but has not completed at this time     Reviewed record In preparation for visit, huddled with provider and have obtained necessary documentation      Health Maintenance Due   Topic    Hepatitis C Screening     COLONOSCOPY     DTaP/Tdap/Td series (1 - Tdap)    HEMOGLOBIN A1C Q6M        Wt Readings from Last 3 Encounters:   18 216 lb (98 kg)   17 216 lb 12.8 oz (98.3 kg)   07/10/17 221 lb 6.4 oz (100.4 kg)     Temp Readings from Last 3 Encounters:   18 98 °F (36.7 °C) (Oral)   17 98.1 °F (36.7 °C) (Oral)   07/10/17 97.8 °F (36.6 °C) (Oral)     BP Readings from Last 3 Encounters:   18 134/80   17 124/90   07/10/17 128/80     Pulse Readings from Last 3 Encounters:   18 94   17 (!) 107   07/10/17 94     Vitals:    18 0920 18 0946   BP: (!) 136/95 134/80   Pulse: 94    Resp: 16    Temp: 98 °F (36.7 °C)    TempSrc: Oral    SpO2: 96%    Weight: 216 lb (98 kg)    Height: 5' 9\" (1.753 m)    PainSc:   0 - No pain          Learning Assessment:  :     No flowsheet data found. Depression Screening:  :     No flowsheet data found. Fall Risk Assessment:  :     No flowsheet data found.     Abuse Screening:  : No flowsheet data found. ADL Screening:  :     No flowsheet data found. I have received verbal consent from Josey Wilks to discuss any/all medical information while they are present in the room. Medication reconciliation up to date and corrected with patient at this time.

## 2018-05-03 NOTE — MR AVS SNAPSHOT
[address] [place] [address] [phone] Patient: Patience Chau MRN: [id number] MUW:[id number] Visit Information Date & Time Provider Department Dept. Phone Encounter #  
 5/3/2018  9:30 AM Leilani Monzon MD [facility] [id number] Follow-up Instructions Return in about 6 months (around 11/3/2018) for bp, chol, dm. Routing History Upcoming Health Maintenance Date Due Hepatitis C Screening 1960 COLONOSCOPY 7/16/1977 DTaP/Tdap/Td series (1 - Tdap) 7/16/1980 HEMOGLOBIN A1C Q6M 12/8/2017 FOOT EXAM Q1 6/8/2018 LIPID PANEL Q1 6/8/2018 MICROALBUMIN Q1 7/11/2018 Influenza Age 5 to Adult 8/1/2018 EYE EXAM RETINAL OR DILATED Q1 10/26/2018 Allergies as of 5/3/2018  Review Complete On: 5/3/2018 By: Leilani Monzon MD  
 No Known Allergies Current Immunizations  Never Reviewed No immunizations on file. Not reviewed this visit You Were Diagnosed With   
  
 Codes Comments Well adult exam    -  Primary ICD-10-CM: Z00.00 ICD-9-CM: V70.0 BMI 31.0-31.9,adult     ICD-10-CM: Z68.31 
ICD-9-CM: V85.31 Type 2 diabetes mellitus with complication, without long-term current use of insulin (HCC)     ICD-10-CM: E11.8 ICD-9-CM: 250.90 Essential hypertension     ICD-10-CM: I10 
ICD-9-CM: 401.9 Encounter for hepatitis C screening test for low risk patient     ICD-10-CM: Z11.59 
ICD-9-CM: V73.89 Prostate cancer screening     ICD-10-CM: Z12.5 ICD-9-CM: V76.44 Urinary frequency     ICD-10-CM: R35.0 ICD-9-CM: 788.41 Vitals BP Pulse Temp Resp Height(growth percentile) Weight(growth percentile) 134/80 94 98 °F (36.7 °C) (Oral) 16 5' 9\" (1.753 m) 216 lb (98 kg) SpO2 BMI Smoking Status 96% 31.9 kg/m2 Never Smoker Vitals History BMI and BSA Data Body Mass Index Body Surface Area 31.9 kg/m 2 2.18 m 2 Preferred Pharmacy Pharmacy Name Phone [facility]/PHARMACY #[id number] Jacquie Fung [place] [phone] Your Updated Medication List  
  
   
This list is accurate as of 5/3/18  9:56 AM.  Always use your most recent med list.  
  
  
  
  
 amitriptyline 25 mg tablet Commonly known as:  ELAVIL Take 1 Tab by mouth nightly. aspirin 325 mg tablet Commonly known as:  ASPIRIN Take 325 mg by mouth daily. losartan 25 mg tablet Commonly known as:  COZAAR Take 1 Tab by mouth daily. metFORMIN 500 mg tablet Commonly known as:  GLUCOPHAGE Take 2 Tabs by mouth two (2) times daily (with meals). Prescriptions Sent to Pharmacy Refills  
 metFORMIN (GLUCOPHAGE) 500 mg tablet 2 Sig: Take 2 Tabs by mouth two (2) times daily (with meals). Class: Normal  
 Pharmacy: [facility]/pharmacy #[id number] [address] Ph #: [phone] Route: Oral  
 losartan (COZAAR) 25 mg tablet 2 Sig: Take 1 Tab by mouth daily. Class: Normal  
 Pharmacy: [facility]/pharmacy #[id number] [address] Ph #: [phone] Route: Oral  
  
We Performed the Following HEMOGLOBIN A1C WITH EAG [34355 CPT(R)] HEPATITIS C AB [79944 CPT(R)] LIPID PANEL [89846 CPT(R)] METABOLIC PANEL, COMPREHENSIVE [33753 CPT(R)] Follow-up Instructions Return in about 6 months (around 11/3/2018) for bp, chol, dm. Patient Instructions Check blood sugar once daily and bring values to your next visit. Check some first thing in the morning, some before lunch, some before dinner, and some at bedtime. Also check some 2 hours after a meal. 
  
Goal is  fasting and no higher than 140-150 2 hours after a meal.  
 
 
 
 
[facility] [address] [facility], [address] [facility]! Dear Will Port: Thank you for requesting a [employer] account. Our records indicate that you already have an active [employer] account. You can access your account anytime at [email or web address] Did you know that you can access your hospital and ER discharge instructions at any time in [employer]? You can also review all of your test results from your hospital stay or ER visit. Additional Information If you have questions, please visit the Frequently Asked Questions section of the [employer] website at [email or web address] Remember, [employer] is NOT to be used for urgent needs. For medical emergencies, dial 911. Now available from your iPhone and Android! Please provide this summary of care documentation to your next provider. Your primary care clinician is listed as Marleni Coker. If you have any questions after today's visit, please call [phone].

## 2018-05-03 NOTE — PATIENT INSTRUCTIONS
Check blood sugar once daily and bring values to your next visit. Check some first thing in the morning, some before lunch, some before dinner, and some at bedtime.   Also check some 2 hours after a meal.     Goal is  fasting and no higher than 140-150 2 hours after a meal.           [facility]  [address] [place] [address]

## 2018-05-04 LAB
ALBUMIN SERPL-MCNC: 4.6 G/DL (ref 3.5–5.5)
ALBUMIN/GLOB SERPL: 1.8 {RATIO} (ref 1.2–2.2)
ALP SERPL-CCNC: 96 IU/L (ref 39–117)
ALT SERPL-CCNC: 19 IU/L (ref 0–44)
AST SERPL-CCNC: 17 IU/L (ref 0–40)
BILIRUB SERPL-MCNC: 0.5 MG/DL (ref 0–1.2)
BUN SERPL-MCNC: 16 MG/DL (ref 6–24)
BUN/CREAT SERPL: 16 (ref 9–20)
CALCIUM SERPL-MCNC: 9.2 MG/DL (ref 8.7–10.2)
CHLORIDE SERPL-SCNC: 98 MMOL/L (ref 96–106)
CHOLEST SERPL-MCNC: 172 MG/DL (ref 100–199)
CO2 SERPL-SCNC: 22 MMOL/L (ref 18–29)
CREAT SERPL-MCNC: 0.98 MG/DL (ref 0.76–1.27)
EST. AVERAGE GLUCOSE BLD GHB EST-MCNC: 154 MG/DL
GFR SERPLBLD CREATININE-BSD FMLA CKD-EPI: 85 ML/MIN/1.73
GFR SERPLBLD CREATININE-BSD FMLA CKD-EPI: 98 ML/MIN/1.73
GLOBULIN SER CALC-MCNC: 2.5 G/DL (ref 1.5–4.5)
GLUCOSE SERPL-MCNC: 88 MG/DL (ref 65–99)
HBA1C MFR BLD: 7 % (ref 4.8–5.6)
HCV AB S/CO SERPL IA: <0.1 S/CO RATIO (ref 0–0.9)
HDLC SERPL-MCNC: 39 MG/DL
LDLC SERPL CALC-MCNC: 107 MG/DL (ref 0–99)
POTASSIUM SERPL-SCNC: 4.9 MMOL/L (ref 3.5–5.2)
PROT SERPL-MCNC: 7.1 G/DL (ref 6–8.5)
SODIUM SERPL-SCNC: 138 MMOL/L (ref 134–144)
TRIGL SERPL-MCNC: 129 MG/DL (ref 0–149)
VLDLC SERPL CALC-MCNC: 26 MG/DL (ref 5–40)

## 2018-05-06 LAB
PSA SERPL-MCNC: 0.6 NG/ML (ref 0–4)
SPECIMEN STATUS REPORT, ROLRST: NORMAL

## 2018-06-28 ENCOUNTER — OFFICE VISIT (OUTPATIENT)
Dept: INTERNAL MEDICINE CLINIC | Facility: CLINIC | Age: 58
End: 2018-06-28

## 2018-06-28 VITALS
HEART RATE: 93 BPM | OXYGEN SATURATION: 95 % | RESPIRATION RATE: 18 BRPM | BODY MASS INDEX: 32.05 KG/M2 | HEIGHT: 69 IN | TEMPERATURE: 97.7 F | WEIGHT: 216.4 LBS | DIASTOLIC BLOOD PRESSURE: 90 MMHG | SYSTOLIC BLOOD PRESSURE: 140 MMHG

## 2018-06-28 DIAGNOSIS — I10 ESSENTIAL HYPERTENSION: Primary | ICD-10-CM

## 2018-06-28 DIAGNOSIS — E11.8 TYPE 2 DIABETES MELLITUS WITH COMPLICATION, WITHOUT LONG-TERM CURRENT USE OF INSULIN (HCC): ICD-10-CM

## 2018-06-28 RX ORDER — AMLODIPINE BESYLATE 5 MG/1
5 TABLET ORAL DAILY
Qty: 30 TAB | Refills: 3 | Status: SHIPPED | OUTPATIENT
Start: 2018-06-28 | End: 2018-11-08 | Stop reason: SDUPTHER

## 2018-06-28 NOTE — PROGRESS NOTES
HPI  Mario Williamson is a 61y.o. year old male patient of Chris Saldana MD who presents with c/o high BP. Pt has history of has Type 2 diabetes mellitus with complication, without long-term current use of insulin ([address] and Essential hypertension on his problem list..    States BP was 177/125 at home getting ready for work this AM.  Doesn't check it regularly. Taking his losartan everyday, no missed doses. Has been drinking a lot of regular Gatorade lately- maybe a pint a day. No other changes in diet. No new meds. Denies chest pain, chest pressure, or palpitations. Denies SOB, orthopnea, or PND. Denies lower extremity swelling. Also c/o feels off balance in his head, some dizziness. Denies ear pain, no discharge from ears, no new hearing loss. Feels almost like he got off a boat, still moving. Some ringing in ears, states has chronic tinnitus. Denies blurred vision. Denies f/c, denies sinus pressure or sinus HA, runny nose, sore throat, or bodyaches. Denies BRBPR, melena, or blood in urine. Feels muscle burn in bilateral arms/biceps. Feels like he worked out but he hasn't been. Denies overuse or injury. States pain is improving. Hasn't been checking BS lately- when he does it's 110-145.       Patient Active Problem List   Diagnosis Code    Type 2 diabetes mellitus with complication, without long-term current use of insulin ([address] E11.8    Essential hypertension I10     Past Medical History:   Diagnosis Date    Diabetes ([address]     Hypertension      Past Surgical History:   Procedure Laterality Date    HX GI      hemorroids 10/2014     Social History     Social History    Marital status:      Spouse name: N/A    Number of children: N/A    Years of education: N/A     Social History Main Topics    Smoking status: Never Smoker    Smokeless tobacco: Never Used    Alcohol use No    Drug use: No    Sexual activity: Yes     Partners: Female     Other Topics Concern    None     Social History Narrative    , 3 children grown - 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  losartan (COZAAR) 25 mg tablet Take 1 Tab by mouth daily.  amitriptyline (ELAVIL) 25 mg tablet Take 1 Tab by mouth nightly.  aspirin (ASPIRIN) 325 mg tablet Take 325 mg by mouth daily. No current facility-administered medications for this visit. REVIEW OF SYSTEMS  Per HPI        Visit Vitals    /90    Pulse 93    Temp 97.7 °F (36.5 °C) (Oral)    Resp 18    Ht 5' 9\" (1.753 m)    Wt 216 lb 6.4 oz (98.2 kg)    SpO2 95%    BMI 31.96 kg/m2         General: Well-developed, well-nourished. In no distress. A&O x 3. Head: Normocephalic, atraumatic. Eyes: Conjunctiva clear. Pupils equal, round, reactive to light. Extraocular movements intact. Ears/Nose: TM's and ear canals normal bilaterally. Nares normal. Septum midline. Normal nasal mucosa. No drainage or sinus tenderness. Mouth/Throat: Lips, mucosa, and tongue normal. Oropharynx benign. Neck: Supple, symmetrical, trachea midline, no lymphadenopathy, no carotid bruits, no JVD, thyroid: not enlarged, symmetric, no tenderness/mass/nodules. Lungs: Clear to auscultation bilaterally. No crackles or wheezes. No use of accessory muscles. Speaks in full sentences without SOB. Chest Wall: No tenderness or deformity. Heart: RRR, normal S1 and S2, no murmur, click, rub, or gallop. Back: Symmetric. No C-spine tenderness. Abdomen: Soft, non-distended  Skin: No rashes or lesions. Neurological: CN II-XII intact. Normal strength, sensation throughout. Pulses:       Posterior tibial pulses are 2+ on the right side, and 2+ on the left side. Musculoskeletal: Gait normal. ROM normal at bilateral elbows. Psychiatric: Normal mood and affect. Behavior is normal.       Lab Results   Component Value Date/Time    Sodium 138 05/03/2018 10:03 AM    Potassium 4.9 05/03/2018 10:03 AM    Chloride 98 05/03/2018 10:03 AM    CO2 22 05/03/2018 10:03 AM    Glucose 88 05/03/2018 10:03 AM    BUN 16 05/03/2018 10:03 AM    Creatinine 0.98 05/03/2018 10:03 AM    BUN/Creatinine ratio 16 05/03/2018 10:03 AM    GFR est AA 98 05/03/2018 10:03 AM    GFR est non-AA 85 05/03/2018 10:03 AM    Calcium 9.2 05/03/2018 10:03 AM    Bilirubin, total 0.5 05/03/2018 10:03 AM    AST (SGOT) 17 05/03/2018 10:03 AM    Alk. phosphatase 96 05/03/2018 10:03 AM    Protein, total 7.1 05/03/2018 10:03 AM    Albumin 4.6 05/03/2018 10:03 AM    A-G Ratio 1.8 05/03/2018 10:03 AM    ALT (SGPT) 19 05/03/2018 10:03 AM     Lab Results   Component Value Date/Time    WBC 10.4 12/05/2017 12:05 PM    HGB 16.0 12/05/2017 12:05 PM    HCT 46.8 12/05/2017 12:05 PM    PLATELET 430 82/88/3878 12:05 PM    MCV 94 12/05/2017 12:05 PM     Lab Results   Component Value Date/Time    Hemoglobin A1c 7.0 (H) 05/03/2018 10:03 AM       ASSESSMENT and PLAN  Diagnoses and all orders for this visit:    1. Essential hypertension  -     amLODIPine (NORVASC) 5 mg tablet; Take 1 Tab by mouth daily.  -Continue Losartan  -Bring home BP cuff to next visit    2. Type 2 diabetes mellitus with complication, without long-term current use of insulin ([facility])  -decrease Gatorades- reviewed high sugar and sodium content  -Check BS at home reguarly            Patient Instructions     *Continue Losartan  *Start amlodipine. Continue to check blood pressures at home once a day after sitting quietly for 15 minutes. Notify us if it is consistently over 140/90. If we have not checked your blood pressure device in the past 2 years, please bring it at your next visit to be sure it correlates with ours.     Please contact our office if your symptoms worsen or do not improve. Please call 911 or go directly to the Emergency Department if you develop shortness of breath, chest pain, difficulty breathing or worsening of your symptoms. High Blood Pressure: Care Instructions  Your Care Instructions    If your blood pressure is usually above 140/90, you have high blood pressure, or hypertension. That means the top number is 140 or higher or the bottom number is 90 or higher, or both. Despite what a lot of people think, high blood pressure usually doesn't cause headaches or make you feel dizzy or lightheaded. It usually has no symptoms. But it does increase your risk for heart attack, stroke, and kidney or eye damage. The higher your blood pressure, the more your risk increases. Your doctor will give you a goal for your blood pressure. Your goal will be based on your health and your age. An example of a goal is to keep your blood pressure below 140/90. Lifestyle changes, such as eating healthy and being active, are always important to help lower blood pressure. You might also take medicine to reach your blood pressure goal.  Follow-up care is a key part of your treatment and safety. Be sure to make and go to all appointments, and call your doctor if you are having problems. It's also a good idea to know your test results and keep a list of the medicines you take. How can you care for yourself at home? Medical treatment  · If you stop taking your medicine, your blood pressure will go back up. You may take one or more types of medicine to lower your blood pressure. Be safe with medicines. Take your medicine exactly as prescribed. Call your doctor if you think you are having a problem with your medicine. · Talk to your doctor before you start taking aspirin every day. Aspirin can help certain people lower their risk of a heart attack or stroke. But taking aspirin isn't right for everyone, because it can cause serious bleeding.   · See your doctor regularly. You may need to see the doctor more often at first or until your blood pressure comes down. · If you are taking blood pressure medicine, talk to your doctor before you take decongestants or anti-inflammatory medicine, such as ibuprofen. Some of these medicines can raise blood pressure. · Learn how to check your blood pressure at home. Lifestyle changes  · Stay at a healthy weight. This is especially important if you put on weight around the waist. Losing even 10 pounds can help you lower your blood pressure. · If your doctor recommends it, get more exercise. Walking is a good choice. Bit by bit, increase the amount you walk every day. Try for at least 30 minutes on most days of the week. You also may want to swim, bike, or do other activities. · Avoid or limit alcohol. Talk to your doctor about whether you can drink any alcohol. · Try to limit how much sodium you eat to less than 2,300 milligrams (mg) a day. Your doctor may ask you to try to eat less than 1,500 mg a day. · Eat plenty of fruits (such as bananas and oranges), vegetables, legumes, whole grains, and low-fat dairy products. · Lower the amount of saturated fat in your diet. Saturated fat is found in animal products such as milk, cheese, and meat. Limiting these foods may help you lose weight and also lower your risk for heart disease. · Do not smoke. Smoking increases your risk for heart attack and stroke. If you need help quitting, talk to your doctor about stop-smoking programs and medicines. These can increase your chances of quitting for good. When should you call for help? Call 911 anytime you think you may need emergency care. This may mean having symptoms that suggest that your blood pressure is causing a serious heart or blood vessel problem. Your blood pressure may be over 180/110. ? For example, call 911 if:  ? · You have symptoms of a heart attack.  These may include:  ¨ Chest pain or pressure, or a strange feeling in the chest.  ¨ Sweating. ¨ Shortness of breath. ¨ Nausea or vomiting. ¨ Pain, pressure, or a strange feeling in the back, neck, jaw, or upper belly or in one or both shoulders or arms. ¨ Lightheadedness or sudden weakness. ¨ A fast or irregular heartbeat. ? · You have symptoms of a stroke. These may include:  ¨ Sudden numbness, tingling, weakness, or loss of movement in your face, arm, or leg, especially on only one side of your body. ¨ Sudden vision changes. ¨ Sudden trouble speaking. ¨ Sudden confusion or trouble understanding simple statements. ¨ Sudden problems with walking or balance. ¨ A sudden, severe headache that is different from past headaches. ? · You have severe back or belly pain. ?Do not wait until your blood pressure comes down on its own. Get help right away. ?Call your doctor now or seek immediate care if:  ? · Your blood pressure is much higher than normal (such as 180/110 or higher), but you don't have symptoms. ? · You think high blood pressure is causing symptoms, such as:  ¨ Severe headache. ¨ Blurry vision. ? Watch closely for changes in your health, and be sure to contact your doctor if:  ? · Your blood pressure measures 140/90 or higher at least 2 times. That means the top number is 140 or higher or the bottom number is 90 or higher, or both. ? · You think you may be having side effects from your blood pressure medicine. ? · Your blood pressure is usually normal, but it goes above normal at least 2 times. Where can you learn more? Go to http://kerrie-renny.info/. Enter R408 in the search box to learn more about \"High Blood Pressure: Care Instructions. \"  Current as of: September 21, 2016  Content Version: 11.4  © 1707-6831 [employer]. Care instructions adapted under license by [employer] (which disclaims liability or warranty for this information).  If you have questions about a medical condition or this instruction, always ask your healthcare professional. [place] [address] any warranty or liability for your use of this information. Learning About Diabetes Food Guidelines  Your Care Instructions    Meal planning is important to manage diabetes. It helps keep your blood sugar at a target level (which you set with your doctor). You don't have to eat special foods. You can eat what your family eats, including sweets once in a while. But you do have to pay attention to how often you eat and how much you eat of certain foods. You may want to work with a dietitian or a certified diabetes educator (CDE) to help you plan meals and snacks. A dietitian or CDE can also help you lose weight if that is one of your goals. What should you know about eating carbs? Managing the amount of carbohydrate (carbs) you eat is an important part of healthy meals when you have diabetes. Carbohydrate is found in many foods. · Learn which foods have carbs. And learn the amounts of carbs in different foods. ¨ Bread, cereal, pasta, and rice have about 15 grams of carbs in a serving. A serving is 1 slice of bread (1 ounce), ½ cup of cooked cereal, or 1/3 cup of cooked pasta or rice. ¨ Fruits have 15 grams of carbs in a serving. A serving is 1 small fresh fruit, such as an apple or orange; ½ of a banana; ½ cup of cooked or canned fruit; ½ cup of fruit juice; 1 cup of melon or raspberries; or 2 tablespoons of dried fruit. ¨ Milk and no-sugar-added yogurt have 15 grams of carbs in a serving. A serving is 1 cup of milk or 2/3 cup of no-sugar-added yogurt. ¨ Starchy vegetables have 15 grams of carbs in a serving. A serving is ½ cup of mashed potatoes or sweet potato; 1 cup winter squash; ½ of a small baked potato; ½ cup of cooked beans; or ½ cup cooked corn or green peas. · Learn how much carbs to eat each day and at each meal. A dietitian or CDE can teach you how to keep track of the amount of carbs you eat.  This is called carbohydrate counting. · If you are not sure how to count carbohydrate grams, use the Plate Method to plan meals. It is a good, quick way to make sure that you have a balanced meal. It also helps you spread carbs throughout the day. ¨ Divide your plate by types of foods. Put non-starchy vegetables on half the plate, meat or other protein food on one-quarter of the plate, and a grain or starchy vegetable in the final quarter of the plate. To this you can add a small piece of fruit and 1 cup of milk or yogurt, depending on how many carbs you are supposed to eat at a meal.  · Try to eat about the same amount of carbs at each meal. Do not \"save up\" your daily allowance of carbs to eat at one meal.  · Proteins have very little or no carbs per serving. Examples of proteins are beef, chicken, turkey, fish, eggs, tofu, cheese, cottage cheese, and peanut butter. A serving size of meat is 3 ounces, which is about the size of a deck of cards. Examples of meat substitute serving sizes (equal to 1 ounce of meat) are 1/4 cup of cottage cheese, 1 egg, 1 tablespoon of peanut butter, and ½ cup of tofu. How can you eat out and still eat healthy? · Learn to estimate the serving sizes of foods that have carbohydrate. If you measure food at home, it will be easier to estimate the amount in a serving of restaurant food. · If the meal you order has too much carbohydrate (such as potatoes, corn, or baked beans), ask to have a low-carbohydrate food instead. Ask for a salad or green vegetables. · If you use insulin, check your blood sugar before and after eating out to help you plan how much to eat in the future. · If you eat more carbohydrate at a meal than you had planned, take a walk or do other exercise. This will help lower your blood sugar. What else should you know? · Limit saturated fat, such as the fat from meat and dairy products. This is a healthy choice because people who have diabetes are at higher risk of heart disease.  So choose lean cuts of meat and nonfat or low-fat dairy products. Use olive or canola oil instead of butter or shortening when cooking. · Don't skip meals. Your blood sugar may drop too low if you skip meals and take insulin or certain medicines for diabetes. · Check with your doctor before you drink alcohol. Alcohol can cause your blood sugar to drop too low. Alcohol can also cause a bad reaction if you take certain diabetes medicines. Follow-up care is a key part of your treatment and safety. Be sure to make and go to all appointments, and call your doctor if you are having problems. It's also a good idea to know your test results and keep a list of the medicines you take. Where can you learn more? Go to http://kerrie-renny.info/. Enter Y548 in the search box to learn more about \"Learning About Diabetes Food Guidelines. \"  Current as of: March 13, 2017  Content Version: 11.4  © 1293-9876 [employer]. Care instructions adapted under license by [facility] (which disclaims liability or warranty for this information). If you have questions about a medical condition or this instruction, always ask your healthcare professional. [place] [address] any warranty or liability for your use of this information. Please keep your follow-up appointment with Vasiliy Moseley MD.     Follow-up Disposition:  Return in about 1 month (around 7/28/2018), or if symptoms worsen or fail to improve, for HTN. Health Maintenance Due   Topic Date Due    COLONOSCOPY  07/16/1977    DTaP/Tdap/Td series (1 - Tdap) 07/16/1980    FOOT EXAM Q1  06/08/2018    MICROALBUMIN Q1  07/11/2018       I have discussed the diagnosis with the patient and the intended plan as seen in the above orders. Patient is in agreement. The patient has received an after-visit summary and questions were answered concerning future plans.   I have discussed medication side effects and warnings with the patient as well. Warning signs for the above conditions were discussed including when to call our office or go to the emergency room. The nurse provided the patient and/or family with advanced directive information if needed and encouraged the patient to provide a copy to the office when available.

## 2018-06-28 NOTE — MR AVS SNAPSHOT
[address] [address] [phone] Patient: Sridhar Miller MRN: [id number] Visit Information Date & Time Provider Department Dept. Phone Encounter #  
 6/28/2018  9:00 AM Meet Gretchen Savannah [facility] [address] [place] [address] [id number] Follow-up Instructions Return in about 1 month (around 7/28/2018), or if symptoms worsen or fail to improve, for HTN. Your Appointments 11/6/2018  8:30 AM  
ROUTINE CARE with MD Fatou Segal [facility]) Appt Note: 6 month f/u; bp, chol, dm  
 [address] [id number] [phone]  
  
   
 [address] [facility] Upcoming Health Maintenance Date Due COLONOSCOPY 7/16/1977 DTaP/Tdap/Td series (1 - Tdap) 7/16/1980 FOOT EXAM Q1 6/8/2018 MICROALBUMIN Q1 7/11/2018 Influenza Age 5 to Adult 8/1/2018 EYE EXAM RETINAL OR DILATED Q1 10/26/2018 HEMOGLOBIN A1C Q6M 11/3/2018 LIPID PANEL Q1 5/3/2019 Allergies as of 6/28/2018  Review Complete On: 6/28/2018 By: Clover Fink LPN No Known Allergies Current Immunizations  Reviewed on 6/28/2018 No immunizations on file. Reviewed by Clover Fink LPN on 2/70/4722 at  8:59 AM  
You Were Diagnosed With   
  
 Codes Comments Essential hypertension    -  Primary ICD-10-CM: I10 
ICD-9-CM: 401.9 Type 2 diabetes mellitus with complication, without long-term current use of insulin (HCC)     ICD-10-CM: E11.8 ICD-9-CM: 250.90 Vitals BP Pulse Temp Resp Height(growth percentile) Weight(growth percentile) 140/90 93 97.7 °F (36.5 °C) (Oral) 18 5' 9\" (1.753 m) 216 lb 6.4 oz (98.2 kg) SpO2 BMI Smoking Status 95% 31.96 kg/m2 Never Smoker Vitals History BMI and BSA Data Body Mass Index Body Surface Area  31.96 kg/m 2 2.19 m 2  
  
  
 Preferred Pharmacy Pharmacy Name Phone [facility]/PHARMACY #[id number] Jacquie Ko [phone] Your Updated Medication List  
  
   
This list is accurate as of 6/28/18  9:49 AM.  Always use your most recent med list.  
  
  
  
  
 amitriptyline 25 mg tablet Commonly known as:  ELAVIL Take 1 Tab by mouth nightly. amLODIPine 5 mg tablet Commonly known as:  Missy Ape Take 1 Tab by mouth daily. aspirin 325 mg tablet Commonly known as:  ASPIRIN Take 325 mg by mouth daily. losartan 25 mg tablet Commonly known as:  COZAAR Take 1 Tab by mouth daily. metFORMIN 500 mg tablet Commonly known as:  GLUCOPHAGE Take 2 Tabs by mouth two (2) times daily (with meals). Prescriptions Sent to Pharmacy Refills  
 amLODIPine (NORVASC) 5 mg tablet 3 Sig: Take 1 Tab by mouth daily. Class: Normal  
 Pharmacy: [facility]/pharmacy #[id number] - Christine Chiu, [address] [place] [address] [facility] [phone] Route: Oral  
  
Follow-up Instructions Return in about 1 month (around 7/28/2018), or if symptoms worsen or fail to improve, for HTN. Patient Instructions *Continue Losartan *Start amlodipine. Continue to check blood pressures at home once a day after sitting quietly for 15 minutes. Notify us if it is consistently over 140/90. If we have not checked your blood pressure device in the past 2 years, please bring it at your next visit to be sure it correlates with ours. Please contact our office if your symptoms worsen or do not improve. Please call 911 or go directly to the Emergency Department if you develop shortness of breath, chest pain, difficulty breathing or worsening of your symptoms. High Blood Pressure: Care Instructions Your Care Instructions If your blood pressure is usually above 140/90, you have high blood pressure, or hypertension.  That means the top number is 140 or higher or the bottom number is 90 or higher, or both. Despite what a lot of people think, high blood pressure usually doesn't cause headaches or make you feel dizzy or lightheaded. It usually has no symptoms. But it does increase your risk for heart attack, stroke, and kidney or eye damage. The higher your blood pressure, the more your risk increases. Your doctor will give you a goal for your blood pressure. Your goal will be based on your health and your age. An example of a goal is to keep your blood pressure below 140/90. Lifestyle changes, such as eating healthy and being active, are always important to help lower blood pressure. You might also take medicine to reach your blood pressure goal. 
Follow-up care is a key part of your treatment and safety. Be sure to make and go to all appointments, and call your doctor if you are having problems. It's also a good idea to know your test results and keep a list of the medicines you take. How can you care for yourself at home? Medical treatment · If you stop taking your medicine, your blood pressure will go back up. You may take one or more types of medicine to lower your blood pressure. Be safe with medicines. Take your medicine exactly as prescribed. Call your doctor if you think you are having a problem with your medicine. · Talk to your doctor before you start taking aspirin every day. Aspirin can help certain people lower their risk of a heart attack or stroke. But taking aspirin isn't right for everyone, because it can cause serious bleeding. · See your doctor regularly. You may need to see the doctor more often at first or until your blood pressure comes down. · If you are taking blood pressure medicine, talk to your doctor before you take decongestants or anti-inflammatory medicine, such as ibuprofen. Some of these medicines can raise blood pressure. · Learn how to check your blood pressure at home. Lifestyle changes · Stay at a healthy weight. This is especially important if you put on weight around the waist. Losing even 10 pounds can help you lower your blood pressure. · If your doctor recommends it, get more exercise. Walking is a good choice. Bit by bit, increase the amount you walk every day. Try for at least 30 minutes on most days of the week. You also may want to swim, bike, or do other activities. · Avoid or limit alcohol. Talk to your doctor about whether you can drink any alcohol. · Try to limit how much sodium you eat to less than 2,300 milligrams (mg) a day. Your doctor may ask you to try to eat less than 1,500 mg a day. · Eat plenty of fruits (such as bananas and oranges), vegetables, legumes, whole grains, and low-fat dairy products. · Lower the amount of saturated fat in your diet. Saturated fat is found in animal products such as milk, cheese, and meat. Limiting these foods may help you lose weight and also lower your risk for heart disease. · Do not smoke. Smoking increases your risk for heart attack and stroke. If you need help quitting, talk to your doctor about stop-smoking programs and medicines. These can increase your chances of quitting for good. When should you call for help? Call 911 anytime you think you may need emergency care. This may mean having symptoms that suggest that your blood pressure is causing a serious heart or blood vessel problem. Your blood pressure may be over 180/110. ? For example, call 911 if: 
? · You have symptoms of a heart attack. These may include: ¨ Chest pain or pressure, or a strange feeling in the chest. 
¨ Sweating. ¨ Shortness of breath. ¨ Nausea or vomiting. ¨ Pain, pressure, or a strange feeling in the back, neck, jaw, or upper belly or in one or both shoulders or arms. ¨ Lightheadedness or sudden weakness. ¨ A fast or irregular heartbeat. ? · You have symptoms of a stroke. These may include: ¨ Sudden numbness, tingling, weakness, or loss of movement in your face, arm, or leg, especially on only one side of your body. ¨ Sudden vision changes. ¨ Sudden trouble speaking. ¨ Sudden confusion or trouble understanding simple statements. ¨ Sudden problems with walking or balance. ¨ A sudden, severe headache that is different from past headaches. ? · You have severe back or belly pain. ?Do not wait until your blood pressure comes down on its own. Get help right away. ?Call your doctor now or seek immediate care if: 
? · Your blood pressure is much higher than normal (such as 180/110 or higher), but you don't have symptoms. ? · You think high blood pressure is causing symptoms, such as: ¨ Severe headache. ¨ Blurry vision. ? Watch closely for changes in your health, and be sure to contact your doctor if: 
? · Your blood pressure measures 140/90 or higher at least 2 times. That means the top number is 140 or higher or the bottom number is 90 or higher, or both. ? · You think you may be having side effects from your blood pressure medicine. ? · Your blood pressure is usually normal, but it goes above normal at least 2 times. Where can you learn more? Go to http://kerrie-renny.info/. Enter B033 in the search box to learn more about \"High Blood Pressure: Care Instructions. \" Current as of: September 21, 2016 Content Version: 11.4 © 5960-0442 [employer]. Care instructions adapted under license by [employer] (which disclaims liability or warranty for this information). If you have questions about a medical condition or this instruction, always ask your healthcare professional. [address] [address] any warranty or liability for your use of this information. Learning About Diabetes Food Guidelines Your Care Instructions Meal planning is important to manage diabetes.  It helps keep your blood sugar at a target level (which you set with your doctor). You don't have to eat special foods. You can eat what your family eats, including sweets once in a while. But you do have to pay attention to how often you eat and how much you eat of certain foods. You may want to work with a dietitian or a certified diabetes educator (CDE) to help you plan meals and snacks. A dietitian or CDE can also help you lose weight if that is one of your goals. What should you know about eating carbs? Managing the amount of carbohydrate (carbs) you eat is an important part of healthy meals when you have diabetes. Carbohydrate is found in many foods. · Learn which foods have carbs. And learn the amounts of carbs in different foods. ¨ Bread, cereal, pasta, and rice have about 15 grams of carbs in a serving. A serving is 1 slice of bread (1 ounce), ½ cup of cooked cereal, or 1/3 cup of cooked pasta or rice. ¨ Fruits have 15 grams of carbs in a serving. A serving is 1 small fresh fruit, such as an apple or orange; ½ of a banana; ½ cup of cooked or canned fruit; ½ cup of fruit juice; 1 cup of melon or raspberries; or 2 tablespoons of dried fruit. ¨ Milk and no-sugar-added yogurt have 15 grams of carbs in a serving. A serving is 1 cup of milk or 2/3 cup of no-sugar-added yogurt. ¨ Starchy vegetables have 15 grams of carbs in a serving. A serving is ½ cup of mashed potatoes or sweet potato; 1 cup winter squash; ½ of a small baked potato; ½ cup of cooked beans; or ½ cup cooked corn or green peas. · Learn how much carbs to eat each day and at each meal. A dietitian or CDE can teach you how to keep track of the amount of carbs you eat. This is called carbohydrate counting. · If you are not sure how to count carbohydrate grams, use the Plate Method to plan meals. It is a good, quick way to make sure that you have a balanced meal. It also helps you spread carbs throughout the day. ¨ Divide your plate by types of foods. Put non-starchy vegetables on half the plate, meat or other protein food on one-quarter of the plate, and a grain or starchy vegetable in the final quarter of the plate. To this you can add a small piece of fruit and 1 cup of milk or yogurt, depending on how many carbs you are supposed to eat at a meal. 
· Try to eat about the same amount of carbs at each meal. Do not \"save up\" your daily allowance of carbs to eat at one meal. 
· Proteins have very little or no carbs per serving. Examples of proteins are beef, chicken, turkey, fish, eggs, tofu, cheese, cottage cheese, and peanut butter. A serving size of meat is 3 ounces, which is about the size of a deck of cards. Examples of meat substitute serving sizes (equal to 1 ounce of meat) are 1/4 cup of cottage cheese, 1 egg, 1 tablespoon of peanut butter, and ½ cup of tofu. How can you eat out and still eat healthy? · Learn to estimate the serving sizes of foods that have carbohydrate. If you measure food at home, it will be easier to estimate the amount in a serving of restaurant food. · If the meal you order has too much carbohydrate (such as potatoes, corn, or baked beans), ask to have a low-carbohydrate food instead. Ask for a salad or green vegetables. · If you use insulin, check your blood sugar before and after eating out to help you plan how much to eat in the future. · If you eat more carbohydrate at a meal than you had planned, take a walk or do other exercise. This will help lower your blood sugar. What else should you know? · Limit saturated fat, such as the fat from meat and dairy products. This is a healthy choice because people who have diabetes are at higher risk of heart disease. So choose lean cuts of meat and nonfat or low-fat dairy products. Use olive or canola oil instead of butter or shortening when cooking. · Don't skip meals.  Your blood sugar may drop too low if you skip meals and take insulin or certain medicines for diabetes. · Check with your doctor before you drink alcohol. Alcohol can cause your blood sugar to drop too low. Alcohol can also cause a bad reaction if you take certain diabetes medicines. Follow-up care is a key part of your treatment and safety. Be sure to make and go to all appointments, and call your doctor if you are having problems. It's also a good idea to know your test results and keep a list of the medicines you take. Where can you learn more? Go to http://kerrie-renny.info/. Enter G963 in the search box to learn more about \"Learning About Diabetes Food Guidelines. \" Current as of: March 13, 2017 Content Version: 11.4 © 7985-7803 [employer]. Care instructions adapted under license by [employer] (which disclaims liability or warranty for this information). If you have questions about a medical condition or this instruction, always ask your healthcare professional. [address] any warranty or liability for your use of this information. Introducing [facility]! Dear Jhoana Jose: Thank you for requesting a [employer] account. Our records indicate that you already have an active [employer] account. You can access your account anytime at [email or web address]/Big Think Did you know that you can access your hospital and ER discharge instructions at any time in [employer] You can also review all of your test results from your hospital stay or ER visit. Additional Information If you have questions, please visit the Frequently Asked Questions section of the [employer] website at [email or web address] [email or web address] Remember, [employer] is NOT to be used for urgent needs. For medical emergencies, dial 911. Now available from your iPhone and Android! Please provide this summary of care documentation to your next provider. Your primary care clinician is listed as Marleni Coker. If you have any questions after today's visit, please call [phone].

## 2018-06-28 NOTE — PROGRESS NOTES
Jameson Lyles  Identified pt with two pt identifiers(name and ). Chief Complaint   Patient presents with    Blood Pressure Check     177/125 at home getting ready for work, arm pain in shoulder and elbows in last week, feels off balance in his head       Reviewed record In preparation for visit and have obtained necessary documentation. Given info on advanced directives. 1. Have you been to the ER, urgent care clinic or hospitalized since your last visit? No     2. Have you seen or consulted any other health care providers outside of the [facility] since your last visit? Include any pap smears or colon screening. No    Vitals reviewed with provider. Health Maintenance reviewed:     Health Maintenance Due   Topic    COLONOSCOPY     DTaP/Tdap/Td series (1 - Tdap)    FOOT EXAM Q1     MICROALBUMIN Q1           Wt Readings from Last 3 Encounters:   18 216 lb 6.4 oz (98.2 kg)   18 216 lb (98 kg)   17 216 lb 12.8 oz (98.3 kg)        Temp Readings from Last 3 Encounters:   18 97.7 °F (36.5 °C) (Oral)   18 98 °F (36.7 °C) (Oral)   17 98.1 °F (36.7 °C) (Oral)        BP Readings from Last 3 Encounters:   18 (!) 146/91   18 134/80   17 124/90        Pulse Readings from Last 3 Encounters:   18 93   18 94   17 (!) 107        Vitals:    18 0907   BP: (!) 146/91   Pulse: 93   Resp: 18   Temp: 97.7 °F (36.5 °C)   TempSrc: Oral   SpO2: 95%   Weight: 216 lb 6.4 oz (98.2 kg)   Height: 5' 9\" (1.753 m)   PainSc:   3   PainLoc: Arm          Learning Assessment:   :     No flowsheet data found. Depression Screening:   :       PHQ over the last two weeks 2018   Little interest or pleasure in doing things Not at all   Feeling down, depressed or hopeless Not at all   Total Score PHQ 2 0        Fall Risk Assessment:   :     No flowsheet data found. Abuse Screening:   :     No flowsheet data found.      ADL Screening:   : No flowsheet data found.

## 2018-06-28 NOTE — PATIENT INSTRUCTIONS
*Continue Losartan  *Start amlodipine. Continue to check blood pressures at home once a day after sitting quietly for 15 minutes. Notify us if it is consistently over 140/90. If we have not checked your blood pressure device in the past 2 years, please bring it at your next visit to be sure it correlates with ours. Please contact our office if your symptoms worsen or do not improve. Please call 911 or go directly to the Emergency Department if you develop shortness of breath, chest pain, difficulty breathing or worsening of your symptoms. High Blood Pressure: Care Instructions  Your Care Instructions    If your blood pressure is usually above 140/90, you have high blood pressure, or hypertension. That means the top number is 140 or higher or the bottom number is 90 or higher, or both. Despite what a lot of people think, high blood pressure usually doesn't cause headaches or make you feel dizzy or lightheaded. It usually has no symptoms. But it does increase your risk for heart attack, stroke, and kidney or eye damage. The higher your blood pressure, the more your risk increases. Your doctor will give you a goal for your blood pressure. Your goal will be based on your health and your age. An example of a goal is to keep your blood pressure below 140/90. Lifestyle changes, such as eating healthy and being active, are always important to help lower blood pressure. You might also take medicine to reach your blood pressure goal.  Follow-up care is a key part of your treatment and safety. Be sure to make and go to all appointments, and call your doctor if you are having problems. It's also a good idea to know your test results and keep a list of the medicines you take. How can you care for yourself at home? Medical treatment  · If you stop taking your medicine, your blood pressure will go back up. You may take one or more types of medicine to lower your blood pressure. Be safe with medicines.  Take your medicine exactly as prescribed. Call your doctor if you think you are having a problem with your medicine. · Talk to your doctor before you start taking aspirin every day. Aspirin can help certain people lower their risk of a heart attack or stroke. But taking aspirin isn't right for everyone, because it can cause serious bleeding. · See your doctor regularly. You may need to see the doctor more often at first or until your blood pressure comes down. · If you are taking blood pressure medicine, talk to your doctor before you take decongestants or anti-inflammatory medicine, such as ibuprofen. Some of these medicines can raise blood pressure. · Learn how to check your blood pressure at home. Lifestyle changes  · Stay at a healthy weight. This is especially important if you put on weight around the waist. Losing even 10 pounds can help you lower your blood pressure. · If your doctor recommends it, get more exercise. Walking is a good choice. Bit by bit, increase the amount you walk every day. Try for at least 30 minutes on most days of the week. You also may want to swim, bike, or do other activities. · Avoid or limit alcohol. Talk to your doctor about whether you can drink any alcohol. · Try to limit how much sodium you eat to less than 2,300 milligrams (mg) a day. Your doctor may ask you to try to eat less than 1,500 mg a day. · Eat plenty of fruits (such as bananas and oranges), vegetables, legumes, whole grains, and low-fat dairy products. · Lower the amount of saturated fat in your diet. Saturated fat is found in animal products such as milk, cheese, and meat. Limiting these foods may help you lose weight and also lower your risk for heart disease. · Do not smoke. Smoking increases your risk for heart attack and stroke. If you need help quitting, talk to your doctor about stop-smoking programs and medicines. These can increase your chances of quitting for good.   When should you call for help?  Call 911 anytime you think you may need emergency care. This may mean having symptoms that suggest that your blood pressure is causing a serious heart or blood vessel problem. Your blood pressure may be over 180/110. ? For example, call 911 if:  ? · You have symptoms of a heart attack. These may include:  ¨ Chest pain or pressure, or a strange feeling in the chest.  ¨ Sweating. ¨ Shortness of breath. ¨ Nausea or vomiting. ¨ Pain, pressure, or a strange feeling in the back, neck, jaw, or upper belly or in one or both shoulders or arms. ¨ Lightheadedness or sudden weakness. ¨ A fast or irregular heartbeat. ? · You have symptoms of a stroke. These may include:  ¨ Sudden numbness, tingling, weakness, or loss of movement in your face, arm, or leg, especially on only one side of your body. ¨ Sudden vision changes. ¨ Sudden trouble speaking. ¨ Sudden confusion or trouble understanding simple statements. ¨ Sudden problems with walking or balance. ¨ A sudden, severe headache that is different from past headaches. ? · You have severe back or belly pain. ?Do not wait until your blood pressure comes down on its own. Get help right away. ?Call your doctor now or seek immediate care if:  ? · Your blood pressure is much higher than normal (such as 180/110 or higher), but you don't have symptoms. ? · You think high blood pressure is causing symptoms, such as:  ¨ Severe headache. ¨ Blurry vision. ? Watch closely for changes in your health, and be sure to contact your doctor if:  ? · Your blood pressure measures 140/90 or higher at least 2 times. That means the top number is 140 or higher or the bottom number is 90 or higher, or both. ? · You think you may be having side effects from your blood pressure medicine. ? · Your blood pressure is usually normal, but it goes above normal at least 2 times. Where can you learn more? Go to http://kerrie-renny.info/.   Enter R808 in the search box to learn more about \"High Blood Pressure: Care Instructions. \"  Current as of: September 21, 2016  Content Version: 11.4  © 3692-4485 [employer]. Care instructions adapted under license by [employer] (which disclaims liability or warranty for this information). If you have questions about a medical condition or this instruction, always ask your healthcare professional. [address] any warranty or liability for your use of this information. Learning About Diabetes Food Guidelines  Your Care Instructions    Meal planning is important to manage diabetes. It helps keep your blood sugar at a target level (which you set with your doctor). You don't have to eat special foods. You can eat what your family eats, including sweets once in a while. But you do have to pay attention to how often you eat and how much you eat of certain foods. You may want to work with a dietitian or a certified diabetes educator (CDE) to help you plan meals and snacks. A dietitian or CDE can also help you lose weight if that is one of your goals. What should you know about eating carbs? Managing the amount of carbohydrate (carbs) you eat is an important part of healthy meals when you have diabetes. Carbohydrate is found in many foods. · Learn which foods have carbs. And learn the amounts of carbs in different foods. ¨ Bread, cereal, pasta, and rice have about 15 grams of carbs in a serving. A serving is 1 slice of bread (1 ounce), ½ cup of cooked cereal, or 1/3 cup of cooked pasta or rice. ¨ Fruits have 15 grams of carbs in a serving. A serving is 1 small fresh fruit, such as an apple or orange; ½ of a banana; ½ cup of cooked or canned fruit; ½ cup of fruit juice; 1 cup of melon or raspberries; or 2 tablespoons of dried fruit. ¨ Milk and no-sugar-added yogurt have 15 grams of carbs in a serving. A serving is 1 cup of milk or 2/3 cup of no-sugar-added yogurt.   ¨ Starchy vegetables have 15 grams of carbs in a serving. A serving is ½ cup of mashed potatoes or sweet potato; 1 cup winter squash; ½ of a small baked potato; ½ cup of cooked beans; or ½ cup cooked corn or green peas. · Learn how much carbs to eat each day and at each meal. A dietitian or CDE can teach you how to keep track of the amount of carbs you eat. This is called carbohydrate counting. · If you are not sure how to count carbohydrate grams, use the Plate Method to plan meals. It is a good, quick way to make sure that you have a balanced meal. It also helps you spread carbs throughout the day. ¨ Divide your plate by types of foods. Put non-starchy vegetables on half the plate, meat or other protein food on one-quarter of the plate, and a grain or starchy vegetable in the final quarter of the plate. To this you can add a small piece of fruit and 1 cup of milk or yogurt, depending on how many carbs you are supposed to eat at a meal.  · Try to eat about the same amount of carbs at each meal. Do not \"save up\" your daily allowance of carbs to eat at one meal.  · Proteins have very little or no carbs per serving. Examples of proteins are beef, chicken, turkey, fish, eggs, tofu, cheese, cottage cheese, and peanut butter. A serving size of meat is 3 ounces, which is about the size of a deck of cards. Examples of meat substitute serving sizes (equal to 1 ounce of meat) are 1/4 cup of cottage cheese, 1 egg, 1 tablespoon of peanut butter, and ½ cup of tofu. How can you eat out and still eat healthy? · Learn to estimate the serving sizes of foods that have carbohydrate. If you measure food at home, it will be easier to estimate the amount in a serving of restaurant food. · If the meal you order has too much carbohydrate (such as potatoes, corn, or baked beans), ask to have a low-carbohydrate food instead. Ask for a salad or green vegetables.   · If you use insulin, check your blood sugar before and after eating out to help you plan how much to eat in the future. · If you eat more carbohydrate at a meal than you had planned, take a walk or do other exercise. This will help lower your blood sugar. What else should you know? · Limit saturated fat, such as the fat from meat and dairy products. This is a healthy choice because people who have diabetes are at higher risk of heart disease. So choose lean cuts of meat and nonfat or low-fat dairy products. Use olive or canola oil instead of butter or shortening when cooking. · Don't skip meals. Your blood sugar may drop too low if you skip meals and take insulin or certain medicines for diabetes. · Check with your doctor before you drink alcohol. Alcohol can cause your blood sugar to drop too low. Alcohol can also cause a bad reaction if you take certain diabetes medicines. Follow-up care is a key part of your treatment and safety. Be sure to make and go to all appointments, and call your doctor if you are having problems. It's also a good idea to know your test results and keep a list of the medicines you take. Where can you learn more? Go to http://kerrie-renny.info/. Enter G707 in the search box to learn more about \"Learning About Diabetes Food Guidelines. \"  Current as of: March 13, 2017  Content Version: 11.4  © 8781-8574 [employer]. Care instructions adapted under license by [employer] (which disclaims liability or warranty for this information). If you have questions about a medical condition or this instruction, always ask your healthcare professional. [place] [address] any warranty or liability for your use of this information.

## 2018-07-30 ENCOUNTER — OFFICE VISIT (OUTPATIENT)
Dept: INTERNAL MEDICINE CLINIC | Facility: CLINIC | Age: 58
End: 2018-07-30

## 2018-07-30 VITALS
HEART RATE: 96 BPM | DIASTOLIC BLOOD PRESSURE: 78 MMHG | HEIGHT: 69 IN | BODY MASS INDEX: 32.29 KG/M2 | RESPIRATION RATE: 16 BRPM | OXYGEN SATURATION: 95 % | WEIGHT: 218 LBS | SYSTOLIC BLOOD PRESSURE: 110 MMHG | TEMPERATURE: 98.2 F

## 2018-07-30 DIAGNOSIS — K58.0 IRRITABLE BOWEL SYNDROME WITH DIARRHEA: ICD-10-CM

## 2018-07-30 DIAGNOSIS — I10 ESSENTIAL HYPERTENSION: ICD-10-CM

## 2018-07-30 DIAGNOSIS — H61.23 BILATERAL IMPACTED CERUMEN: ICD-10-CM

## 2018-07-30 DIAGNOSIS — E11.8 TYPE 2 DIABETES MELLITUS WITH COMPLICATION, WITHOUT LONG-TERM CURRENT USE OF INSULIN (HCC): Primary | ICD-10-CM

## 2018-07-30 LAB — HBA1C MFR BLD HPLC: 6.5 %

## 2018-07-30 NOTE — PROGRESS NOTES
Luther Lesches  Identified pt with two pt identifiers(name and ). Chief Complaint Patient presents with  Hypertension Room 2B// NON fasting // colonoscopy was done at age 47 w/ \"Dr Salty Mijares at [facility]\" //   
 Diabetes 1. Have you been to the ER, urgent care clinic since your last visit? Hospitalized since your last visit? NO 
 
2. Have you seen or consulted any other health care providers outside of the [address] [place] [address] since your last visit? Include any pap smears or colon screening. NO Provider notified of reason for visit, vitals and flowsheets obtained on patients. Patient received paperwork for advance directive during previous visit but has not completed at this time Reviewed record In preparation for visit, huddled with provider and have obtained necessary documentation Health Maintenance Due Topic  COLONOSCOPY  FOOT EXAM Q1   
 MICROALBUMIN Q1 Wt Readings from Last 3 Encounters:  
18 218 lb (98.9 kg) 18 216 lb 6.4 oz (98.2 kg) 18 216 lb (98 kg) Temp Readings from Last 3 Encounters:  
18 97.7 °F (36.5 °C) (Oral) 18 98 °F (36.7 °C) (Oral) 17 98.1 °F (36.7 °C) (Oral) BP Readings from Last 3 Encounters:  
18 140/90  
18 134/80  
17 124/90 Pulse Readings from Last 3 Encounters:  
18 93  
18 94  
17 (!) 107 Vitals:  
 18 1502 Resp: 16 Weight: 218 lb (98.9 kg) Height: 5' 9\" (1.753 m) PainSc:   0 - No pain Learning Assessment: 
:  
 
No flowsheet data found. Depression Screening: 
:  
 
PHQ over the last two weeks 2018 Little interest or pleasure in doing things Not at all Feeling down, depressed, irritable, or hopeless Not at all Total Score PHQ 2 0 Fall Risk Assessment: 
:  
 
Fall Risk Assessment, last 12 mths 2018 Able to walk? Yes Fall in past 12 months?  No  
 
 
Abuse Screening: 
:  
 
Abuse Screening Questionnaire 7/30/2018 Do you ever feel afraid of your partner? Kathy Rebel Are you in a relationship with someone who physically or mentally threatens you? Kathy Rebel Is it safe for you to go home? Y  
 
 
ADL Screening: 
:  
 
ADL Assessment 7/30/2018 Feeding yourself No Help Needed Getting from bed to chair No Help Needed Getting dressed No Help Needed Bathing or showering No Help Needed Walk across the room (includes cane/walker) No Help Needed Using the telphone No Help Needed Taking your medications No Help Needed Preparing meals No Help Needed Managing money (expenses/bills) No Help Needed Moderately strenuous housework (laundry) No Help Needed Shopping for personal items (toiletries/medicines) No Help Needed Shopping for groceries No Help Needed Driving No Help Needed Climbing a flight of stairs No Help Needed Getting to places beyond walking distances No Help Needed Medication reconciliation up to date and corrected with patient at this time.

## 2018-07-30 NOTE — MR AVS SNAPSHOT
[address] [address] [phone] Patient: Bernardo Page MRN: [id number] AAC:4/[birth year]97 Visit Information Date & Time Provider Department Dept. Phone Encounter #  
 7/30/2018  3:00 PM MD Migue Teran [facility] Internal Medicine of [address] [place] [address] [id number] Follow-up Instructions Return in about 4 months (around 11/30/2018) for bp, dm. Your Appointments 11/6/2018  8:30 AM  
ROUTINE CARE with MD Migue Teran [facility] Medicine of [facility]) Appt Note: 6 month f/u; bp, chol, dm  
 [address] [facility] [place] [address] [phone]  
  
   
 [address] [facility] Upcoming Health Maintenance Date Due COLONOSCOPY 7/16/1977 FOOT EXAM Q1 6/8/2018 MICROALBUMIN Q1 7/11/2018 DTaP/Tdap/Td series (1 - Tdap) 9/28/2018* Influenza Age 5 to Adult 8/1/2018 EYE EXAM RETINAL OR DILATED Q1 10/26/2018 HEMOGLOBIN A1C Q6M 11/3/2018 LIPID PANEL Q1 5/3/2019 *Topic was postponed. The date shown is not the original due date. Allergies as of 7/30/2018  Review Complete On: 7/30/2018 By: Barber Sage MD  
 No Known Allergies Current Immunizations  Reviewed on 6/28/2018 No immunizations on file. Not reviewed this visit You Were Diagnosed With   
  
 Codes Comments Type 2 diabetes mellitus with complication, without long-term current use of insulin (HCC)    -  Primary ICD-10-CM: E11.8 ICD-9-CM: 250.90 Essential hypertension     ICD-10-CM: I10 
ICD-9-CM: 401.9 Irritable bowel syndrome with diarrhea     ICD-10-CM: K58.0 ICD-9-CM: 210.5 Bilateral impacted cerumen     ICD-10-CM: H61.23 
ICD-9-CM: 380.4 Vitals BP Pulse Temp Resp Height(growth percentile) Weight(growth percentile)  110/78 (BP 1 Location: Left arm, BP Patient Position: Sitting) 96 98.2 °F (36.8 °C) (Oral) 16 5' 9\" (1.753 m) 218 lb (98.9 kg) SpO2 BMI Smoking Status 95% 32.19 kg/m2 Never Smoker Vitals History BMI and BSA Data Body Mass Index Body Surface Area  
 32.19 kg/m 2 2.19 m 2 Preferred Pharmacy Pharmacy Name Phone [id number] Jacquie Ordaz [phone] Your Updated Medication List  
  
   
This list is accurate as of 7/30/18  3:25 PM.  Always use your most recent med list.  
  
  
  
  
 amitriptyline 25 mg tablet Commonly known as:  ELAVIL Take 1 Tab by mouth nightly. amLODIPine 5 mg tablet Commonly known as:  Josefina Blade Take 1 Tab by mouth daily. aspirin 325 mg tablet Commonly known as:  ASPIRIN Take 325 mg by mouth daily. losartan 25 mg tablet Commonly known as:  COZAAR Take 1 Tab by mouth daily. metFORMIN 500 mg tablet Commonly known as:  GLUCOPHAGE Take 2 Tabs by mouth two (2) times daily (with meals). We Performed the Following AMB POC HEMOGLOBIN A1C [22763 CPT(R)]  DIABETES FOOT EXAM [[employer]] MICROALBUMIN, UR, RAND W/ MICROALB/CREAT RATIO [id number] CPT(R)] Follow-up Instructions Return in about 4 months (around 11/30/2018) for bp, dm. Introducing [facility]! Dear Hawk Murillo: Thank you for requesting a [employer] account. Our records indicate that you already have an active Jobster account. You can access your account anytime at [email or web address] Did you know that you can access your hospital and ER discharge instructions at any time in Jobster? You can also review all of your test results from your hospital stay or ER visit. Additional Information If you have questions, please visit the Frequently Asked Questions section of the Jobster website at [email or web address] Remember, Jobster is NOT to be used for urgent needs. For medical emergencies, dial 911. Now available from your iPhone and Android! Please provide this summary of care documentation to your next provider. Your primary care clinician is listed as Marleni Coker. If you have any questions after today's visit, please call [phone].

## 2018-07-30 NOTE — PROGRESS NOTES
HPI Mr. Rigo Sanabria is a 60y.o. year old male, he is seen today for follow up HTN, DM. Glucose is 130-150 in the morning fasting in the morning. Doesn't check any other times during the day. No chest pain, sob, dizziness, weakness. Says ears have been full, using debrox but still feel clogged. Due for colonoscopy, got a call that colonoscopy was due. Wants to get colonoscopy in [facility]. A1C today is 6.5. Has been walking more at work - normally 5-7 miles a day at work. Drinking more water, less cola. Chief Complaint Patient presents with  Hypertension Room 2B// NON fasting // colonoscopy was done at age 47 w/ \"Dr Wilbert Mull[facility]\" //   
 Diabetes Prior to Admission medications Medication Sig Start Date End Date Taking? Authorizing Provider  
amLODIPine (NORVASC) 5 mg tablet Take 1 Tab by mouth daily. 6/28/18  Yes Shelly Booth NP  
metFORMIN (GLUCOPHAGE) 500 mg tablet Take 2 Tabs by mouth two (2) times daily (with meals). 5/3/18  Yes Tatum Naranjo MD  
losartan (COZAAR) 25 mg tablet Take 1 Tab by mouth daily. 5/3/18  Yes Tatum Naranjo MD  
amitriptyline (ELAVIL) 25 mg tablet Take 1 Tab by mouth nightly. 4/30/18  Yes Tatum Naranjo MD  
aspirin (ASPIRIN) 325 mg tablet Take 325 mg by mouth daily. Yes Historical Provider No Known Allergies REVIEW OF SYSTEMS: 
Per HPI PHYSICAL EXAM: 
Visit Vitals  /78 (BP 1 Location: Left arm, BP Patient Position: Sitting)  Pulse 96  Temp 98.2 °F (36.8 °C) (Oral)  Resp 16  
 Ht 5' 9\" (1.753 m)  Wt 218 lb (98.9 kg)  SpO2 95%  BMI 32.19 kg/m2 Constitutional: Appears well-developed and well-nourished. No distress. HENT:  
Head: Normocephalic and atraumatic. Eyes: No scleral icterus. Ears: cerumen b/l 
Cardiovascular: Normal S1/S2, regular rhythm. No murmurs, rubs, or gallops. Pulmonary/Chest: Effort normal and breath sounds normal. No respiratory distress.  No wheezes, rhonchi, or rales. Ext: No edema. Neurological: Alert. Psychiatric: Normal mood and affect. Behavior is normal. 
 
DM foot exam: 2+ pedal pulses, no lesions, sensation intact to monofilament b/l Lab Results Component Value Date/Time Sodium 138 05/03/2018 10:03 AM  
 Potassium 4.9 05/03/2018 10:03 AM  
 Chloride 98 05/03/2018 10:03 AM  
 CO2 22 05/03/2018 10:03 AM  
 Glucose 88 05/03/2018 10:03 AM  
 BUN 16 05/03/2018 10:03 AM  
 Creatinine 0.98 05/03/2018 10:03 AM  
 BUN/Creatinine ratio 16 05/03/2018 10:03 AM  
 GFR est AA 98 05/03/2018 10:03 AM  
 GFR est non-AA 85 05/03/2018 10:03 AM  
 Calcium 9.2 05/03/2018 10:03 AM  
 Bilirubin, total 0.5 05/03/2018 10:03 AM  
 AST (SGOT) 17 05/03/2018 10:03 AM  
 Alk. phosphatase 96 05/03/2018 10:03 AM  
 Protein, total 7.1 05/03/2018 10:03 AM  
 Albumin 4.6 05/03/2018 10:03 AM  
 A-G Ratio 1.8 05/03/2018 10:03 AM  
 ALT (SGPT) 19 05/03/2018 10:03 AM  
 
Lab Results Component Value Date/Time Hemoglobin A1c 7.0 (H) 05/03/2018 10:03 AM  
 Hemoglobin A1c 7.5 (H) 06/08/2017 02:57 PM  
  
Lab Results Component Value Date/Time Cholesterol, total 172 05/03/2018 10:03 AM  
 HDL Cholesterol 39 (L) 05/03/2018 10:03 AM  
 LDL, calculated 107 (H) 05/03/2018 10:03 AM  
 VLDL, calculated 26 05/03/2018 10:03 AM  
 Triglyceride 129 05/03/2018 10:03 AM  
  
 
 
ASSESSMENT/PLAN Diagnoses and all orders for this visit: 
 
1. Type 2 diabetes mellitus with complication, without long-term current use of insulin ([address] -     MICROALBUMIN, UR, RAND W/ MICROALB/CREAT RATIO 
-      DIABETES FOOT EXAM 
-     AMB POC HEMOGLOBIN A1C At goal - continue current medications 2. Essential hypertension Controlled on current regimen, continue 3. Irritable bowel syndrome with diarrhea Elavil controls 4. Bilateral impacted cerumen -     MI REMOVAL IMPACTED CERUMEN IRRIGATION/LVG UNILAT Health Maintenance Due Topic Date Due  
 COLONOSCOPY  07/16/1978  
 FOOT EXAM Q1  06/08/2018  MICROALBUMIN Q1  07/11/2018 Follow-up Disposition: 
Return in about 4 months (around 11/30/2018) for bp, dm. Reviewed plan of care. Patient has provided input and agrees with goals. The nurse provided the patient and/or family with advanced directive information if needed and encouraged the patient to provide a copy to the office when available.

## 2018-07-31 LAB
ALBUMIN/CREAT UR: <2.4 MG/G CREAT (ref 0–30)
CREAT UR-MCNC: 123.9 MG/DL
MICROALBUMIN UR-MCNC: <3 UG/ML

## 2018-11-06 ENCOUNTER — OFFICE VISIT (OUTPATIENT)
Dept: INTERNAL MEDICINE CLINIC | Facility: CLINIC | Age: 58
End: 2018-11-06

## 2018-11-06 VITALS
HEIGHT: 69 IN | RESPIRATION RATE: 18 BRPM | DIASTOLIC BLOOD PRESSURE: 83 MMHG | TEMPERATURE: 98.1 F | SYSTOLIC BLOOD PRESSURE: 124 MMHG | HEART RATE: 93 BPM | BODY MASS INDEX: 31.87 KG/M2 | OXYGEN SATURATION: 97 % | WEIGHT: 215.2 LBS

## 2018-11-06 DIAGNOSIS — I51.7 LAE (LEFT ATRIAL ENLARGEMENT): ICD-10-CM

## 2018-11-06 DIAGNOSIS — K58.0 IRRITABLE BOWEL SYNDROME WITH DIARRHEA: ICD-10-CM

## 2018-11-06 DIAGNOSIS — R07.9 CHEST PAIN, UNSPECIFIED TYPE: ICD-10-CM

## 2018-11-06 DIAGNOSIS — I10 ESSENTIAL HYPERTENSION: ICD-10-CM

## 2018-11-06 DIAGNOSIS — Z12.11 COLON CANCER SCREENING: ICD-10-CM

## 2018-11-06 DIAGNOSIS — I45.10 RBBB: ICD-10-CM

## 2018-11-06 DIAGNOSIS — E11.8 TYPE 2 DIABETES MELLITUS WITH COMPLICATION, WITHOUT LONG-TERM CURRENT USE OF INSULIN (HCC): Primary | ICD-10-CM

## 2018-11-06 NOTE — PROGRESS NOTES
HPI  Mr. Jeremy Simmons is a 60y.o. year old male, he is seen today for follow up DM, HTN, high cholesterol, IBS. Last visit patient had agreed to schedule colonoscopy. Tells me had burning pain in chest and down left arm and sometimes right arm as well, stopped elavil b/c he was worried it was causing it. Started about 4 mos ago, has had about 10 spells. Last one over the weekend. Sometimes so severe he has to sit still. Not sure if worse with exertion. No associated sob or diaphoresis but felt hot. Felt a little nauseous, no vomiting. Spells may last 20-30 minutes. Doesn't remember strenuous activity with spells. Sometimes while driving to work, sometimes spells happen when he gets upset. Stopped elavil about 4-6 weeks ago and spells didn't go away. Since stopping elavil spells are less frequent and less intent. Thinks BP and heart rate were normal at the time. No heartburn or indigestion. Hasn't checked glucose lately. Remembers 130s - 150s when he checks. IBS didn't get worse with dc'ing elavil. Has had indigestion in past and this felt different. No melena or brbpr. Has been taking asa 325mg normally 1-2 per day for the last few months. No particular reason. Chief Complaint   Patient presents with    Blood Pressure Check     Room 2a// fasting     Diabetes     eye exam last week, Dr Beckie Mcintyre, [place]     Cholesterol Problem        Prior to Admission medications    Medication Sig Start Date End Date Taking? Authorizing Provider   amLODIPine (NORVASC) 5 mg tablet Take 1 Tab by mouth daily. 6/28/18  Yes Jose Son, NP   metFORMIN (GLUCOPHAGE) 500 mg tablet Take 2 Tabs by mouth two (2) times daily (with meals). 5/3/18  Yes Renetta Chen MD   losartan (COZAAR) 25 mg tablet Take 1 Tab by mouth daily. 5/3/18  Yes Renetta Chen MD   aspirin (ASPIRIN) 325 mg tablet Take 325 mg by mouth daily.    Yes Provider, Historical   amitriptyline (ELAVIL) 25 mg tablet Take 1 Tab by mouth nightly. 4/30/18   Ramya Shipman MD         No Known Allergies      REVIEW OF SYSTEMS:  Per HPI    PHYSICAL EXAM:  Visit Vitals  /83 (BP 1 Location: Left arm, BP Patient Position: Sitting)   Pulse 93   Temp 98.1 °F (36.7 °C) (Oral)   Resp 18   Ht 5' 9\" (1.753 m)   Wt 215 lb 3.2 oz (97.6 kg)   SpO2 97%   BMI 31.78 kg/m²     Constitutional: Appears well-developed and well-nourished. No distress. HENT:   Head: Normocephalic and atraumatic. Eyes: No scleral icterus. Neck: no lad, no tm, supple   Cardiovascular: Normal S1/S2, regular rhythm. No murmurs, rubs, or gallops. Pulmonary/Chest: Effort normal and breath sounds normal. No respiratory distress. No wheezes, rhonchi, or rales. No chest wall tenderness to palpation. Abdomen: Soft, NT/ND, +BS, no rebound or guarding, no masses, no HSM appreciated. Ext: No edema. Neurological: Alert. Psychiatric: Normal mood and affect. Behavior is normal.     Lab Results   Component Value Date/Time    Sodium 138 05/03/2018 10:03 AM    Potassium 4.9 05/03/2018 10:03 AM    Chloride 98 05/03/2018 10:03 AM    CO2 22 05/03/2018 10:03 AM    Glucose 88 05/03/2018 10:03 AM    BUN 16 05/03/2018 10:03 AM    Creatinine 0.98 05/03/2018 10:03 AM    BUN/Creatinine ratio 16 05/03/2018 10:03 AM    GFR est AA 98 05/03/2018 10:03 AM    GFR est non-AA 85 05/03/2018 10:03 AM    Calcium 9.2 05/03/2018 10:03 AM    Bilirubin, total 0.5 05/03/2018 10:03 AM    AST (SGOT) 17 05/03/2018 10:03 AM    Alk.  phosphatase 96 05/03/2018 10:03 AM    Protein, total 7.1 05/03/2018 10:03 AM    Albumin 4.6 05/03/2018 10:03 AM    A-G Ratio 1.8 05/03/2018 10:03 AM    ALT (SGPT) 19 05/03/2018 10:03 AM     Lab Results   Component Value Date/Time    Hemoglobin A1c 7.0 (H) 05/03/2018 10:03 AM    Hemoglobin A1c 7.5 (H) 06/08/2017 02:57 PM      Lab Results   Component Value Date/Time    Cholesterol, total 172 05/03/2018 10:03 AM    HDL Cholesterol 39 (L) 05/03/2018 10:03 AM    LDL, calculated 107 (H) 05/03/2018 10:03 AM    VLDL, calculated 26 05/03/2018 10:03 AM    Triglyceride 129 05/03/2018 10:03 AM          ASSESSMENT/PLAN  Diagnoses and all orders for this visit:    1. Type 2 diabetes mellitus with complication, without long-term current use of insulin (HCC)  -     METABOLIC PANEL, COMPREHENSIVE  -     LIPID PANEL  -     HEMOGLOBIN A1C WITH EAG    2. Essential hypertension    3. Irritable bowel syndrome with diarrhea    4. Colon cancer screening  -     REFERRAL TO GASTROENTEROLOGY    5. Chest pain, unspecified type  -     AMB POC EKG ROUTINE W/ 12 LEADS, INTER & REP  -     CBC WITH AUTOMATED DIFF  -     REFERRAL TO CARDIOLOGY    6. RBBB  -     REFERRAL TO CARDIOLOGY    7. LAE (left atrial enlargement)  -     REFERRAL TO CARDIOLOGY      EKG sinus rhythm, RBBB, possible LAE, MICHAEL. Do not suspect chest symptoms related to elavil but can continue to hold for now. Differential also includes gerd, esophageal spasm but with risk factors for CAD including DM, age, male sex, HTN will refer to cardiology. Patient was to get appt before leaving today with our referral coordinator. Will start asa 81mg daily. Check labs related to DM, r/o anemia. BP is controlled. Health Maintenance Due   Topic Date Due    COLONOSCOPY  07/16/1977    DTaP/Tdap/Td series (1 - Tdap) 07/16/1980    EYE EXAM RETINAL OR DILATED Q1  10/26/2018        Follow-up Disposition: Not on File       Reviewed plan of care. Patient has provided input and agrees with goals. The nurse provided the patient and/or family with advanced directive information if needed and encouraged the patient to provide a copy to the office when available.

## 2018-11-07 LAB
ALBUMIN SERPL-MCNC: 4.5 G/DL (ref 3.5–5.5)
ALBUMIN/GLOB SERPL: 1.6 {RATIO} (ref 1.2–2.2)
ALP SERPL-CCNC: 99 IU/L (ref 39–117)
ALT SERPL-CCNC: 16 IU/L (ref 0–44)
AST SERPL-CCNC: 19 IU/L (ref 0–40)
BASOPHILS # BLD AUTO: 0.1 X10E3/UL (ref 0–0.2)
BASOPHILS NFR BLD AUTO: 1 %
BILIRUB SERPL-MCNC: 0.4 MG/DL (ref 0–1.2)
BUN SERPL-MCNC: 12 MG/DL (ref 6–24)
BUN/CREAT SERPL: 13 (ref 9–20)
CALCIUM SERPL-MCNC: 9.7 MG/DL (ref 8.7–10.2)
CHLORIDE SERPL-SCNC: 101 MMOL/L (ref 96–106)
CHOLEST SERPL-MCNC: 189 MG/DL (ref 100–199)
CO2 SERPL-SCNC: 24 MMOL/L (ref 20–29)
CREAT SERPL-MCNC: 0.91 MG/DL (ref 0.76–1.27)
EOSINOPHIL # BLD AUTO: 0.2 X10E3/UL (ref 0–0.4)
EOSINOPHIL NFR BLD AUTO: 3 %
ERYTHROCYTE [DISTWIDTH] IN BLOOD BY AUTOMATED COUNT: 13.9 % (ref 12.3–15.4)
EST. AVERAGE GLUCOSE BLD GHB EST-MCNC: 157 MG/DL
GLOBULIN SER CALC-MCNC: 2.8 G/DL (ref 1.5–4.5)
GLUCOSE SERPL-MCNC: 100 MG/DL (ref 65–99)
HBA1C MFR BLD: 7.1 % (ref 4.8–5.6)
HCT VFR BLD AUTO: 45.7 % (ref 37.5–51)
HDLC SERPL-MCNC: 41 MG/DL
HGB BLD-MCNC: 15.7 G/DL (ref 13–17.7)
IMM GRANULOCYTES # BLD: 0 X10E3/UL (ref 0–0.1)
IMM GRANULOCYTES NFR BLD: 0 %
LDLC SERPL CALC-MCNC: 126 MG/DL (ref 0–99)
LYMPHOCYTES # BLD AUTO: 1.8 X10E3/UL (ref 0.7–3.1)
LYMPHOCYTES NFR BLD AUTO: 32 %
MCH RBC QN AUTO: 32.4 PG (ref 26.6–33)
MCHC RBC AUTO-ENTMCNC: 34.4 G/DL (ref 31.5–35.7)
MCV RBC AUTO: 94 FL (ref 79–97)
MONOCYTES # BLD AUTO: 0.6 X10E3/UL (ref 0.1–0.9)
MONOCYTES NFR BLD AUTO: 10 %
MORPHOLOGY BLD-IMP: NORMAL
NEUTROPHILS # BLD AUTO: 3.1 X10E3/UL (ref 1.4–7)
NEUTROPHILS NFR BLD AUTO: 54 %
PLATELET # BLD AUTO: 190 X10E3/UL (ref 150–379)
POTASSIUM SERPL-SCNC: 5.2 MMOL/L (ref 3.5–5.2)
PROT SERPL-MCNC: 7.3 G/DL (ref 6–8.5)
RBC # BLD AUTO: 4.85 X10E6/UL (ref 4.14–5.8)
SODIUM SERPL-SCNC: 141 MMOL/L (ref 134–144)
TRIGL SERPL-MCNC: 110 MG/DL (ref 0–149)
VLDLC SERPL CALC-MCNC: 22 MG/DL (ref 5–40)
WBC # BLD AUTO: 5.7 X10E3/UL (ref 3.4–10.8)

## 2018-11-08 DIAGNOSIS — I10 ESSENTIAL HYPERTENSION: ICD-10-CM

## 2018-11-08 RX ORDER — AMLODIPINE BESYLATE 5 MG/1
5 TABLET ORAL DAILY
Qty: 30 TAB | Refills: 11 | Status: SHIPPED | OUTPATIENT
Start: 2018-11-08 | End: 2019-10-25 | Stop reason: SDUPTHER

## 2018-11-08 NOTE — TELEPHONE ENCOUNTER
----- Message from Catalino Campos sent at 11/8/2018  7:56 AM EST -----  Regarding: Dr. Ozzie Castañeda  Pt requested a refill on his Rx(\"Amlodipine\") called to [facility](number on file). Pt stated he is out of medication, and no refills left. Best contact number [phone].

## 2019-02-15 DIAGNOSIS — I10 ESSENTIAL HYPERTENSION: ICD-10-CM

## 2019-02-15 RX ORDER — LOSARTAN POTASSIUM 25 MG/1
TABLET ORAL
Qty: 90 TAB | Refills: 2 | Status: SHIPPED | OUTPATIENT
Start: 2019-02-15 | End: 2019-11-12 | Stop reason: SDUPTHER

## 2019-02-20 DIAGNOSIS — E11.8 TYPE 2 DIABETES MELLITUS WITH COMPLICATION, WITHOUT LONG-TERM CURRENT USE OF INSULIN (HCC): ICD-10-CM

## 2019-02-20 RX ORDER — METFORMIN HYDROCHLORIDE 500 MG/1
TABLET ORAL
Qty: 360 TAB | Refills: 2 | Status: SHIPPED | OUTPATIENT
Start: 2019-02-20 | End: 2019-03-07 | Stop reason: DRUGHIGH

## 2019-03-07 ENCOUNTER — OFFICE VISIT (OUTPATIENT)
Dept: INTERNAL MEDICINE CLINIC | Facility: CLINIC | Age: 59
End: 2019-03-07

## 2019-03-07 VITALS
BODY MASS INDEX: 31.13 KG/M2 | WEIGHT: 210.2 LBS | HEART RATE: 82 BPM | HEIGHT: 69 IN | DIASTOLIC BLOOD PRESSURE: 73 MMHG | RESPIRATION RATE: 16 BRPM | TEMPERATURE: 98.2 F | SYSTOLIC BLOOD PRESSURE: 118 MMHG | OXYGEN SATURATION: 95 %

## 2019-03-07 DIAGNOSIS — I42.9 CARDIOMYOPATHY, UNSPECIFIED TYPE (HCC): ICD-10-CM

## 2019-03-07 DIAGNOSIS — I25.10 CORONARY ARTERY DISEASE INVOLVING NATIVE CORONARY ARTERY OF NATIVE HEART WITHOUT ANGINA PECTORIS: ICD-10-CM

## 2019-03-07 DIAGNOSIS — E11.8 TYPE 2 DIABETES MELLITUS WITH COMPLICATION, WITHOUT LONG-TERM CURRENT USE OF INSULIN (HCC): Primary | ICD-10-CM

## 2019-03-07 DIAGNOSIS — I10 ESSENTIAL HYPERTENSION: ICD-10-CM

## 2019-03-07 DIAGNOSIS — E78.2 MIXED HYPERLIPIDEMIA: ICD-10-CM

## 2019-03-07 LAB — HBA1C MFR BLD HPLC: 5.9 %

## 2019-03-07 RX ORDER — METFORMIN HYDROCHLORIDE 500 MG/1
500 TABLET ORAL 2 TIMES DAILY WITH MEALS
Qty: 180 TAB | Refills: 2
Start: 2019-03-07 | End: 2020-08-13 | Stop reason: SDUPTHER

## 2019-03-07 RX ORDER — PRASUGREL 10 MG/1
10 TABLET, FILM COATED ORAL DAILY
COMMUNITY
Start: 2019-03-04 | End: 2020-12-17

## 2019-03-07 RX ORDER — GUAIFENESIN 100 MG/5ML
81 LIQUID (ML) ORAL DAILY
COMMUNITY

## 2019-03-07 RX ORDER — CARVEDILOL 6.25 MG/1
TABLET ORAL
Refills: 4 | COMMUNITY
Start: 2019-01-15 | End: 2020-12-17

## 2019-03-07 RX ORDER — ATORVASTATIN CALCIUM 80 MG/1
80 TABLET, FILM COATED ORAL DAILY
COMMUNITY
Start: 2019-03-04

## 2019-03-07 NOTE — PROGRESS NOTES
An   Identified pt with two pt identifiers(name and ). Chief Complaint   Patient presents with    Blood Pressure Check     Room 2A//     Diabetes       1. Have you been to the ER, urgent care clinic since your last visit? Hospitalized since your last visit? NO    2. Have you seen or consulted any other health care providers outside of the [address] [place] [address] since your last visit? Include any pap smears or colon screening. NO      Provider notified of reason for visit, vitals and flowsheets obtained on patients. Patient received paperwork for advance directive during previous visit but has not completed at this time     Reviewed record In preparation for visit, huddled with provider and have obtained necessary documentation      Health Maintenance Due   Topic    COLONOSCOPY     DTaP/Tdap/Td series (1 - Tdap)       Wt Readings from Last 3 Encounters:   18 215 lb 3.2 oz (97.6 kg)   18 218 lb (98.9 kg)   18 216 lb 6.4 oz (98.2 kg)     Temp Readings from Last 3 Encounters:   18 98.1 °F (36.7 °C) (Oral)   18 98.2 °F (36.8 °C) (Oral)   18 97.7 °F (36.5 °C) (Oral)     BP Readings from Last 3 Encounters:   18 124/83   18 110/78   18 140/90     Pulse Readings from Last 3 Encounters:   18 93   18 96   18 93     There were no vitals filed for this visit. Learning Assessment:  :     No flowsheet data found. Depression Screening:  :     3 most recent PHQ Screens 2018   Little interest or pleasure in doing things Not at all   Feeling down, depressed, irritable, or hopeless Not at all   Total Score PHQ 2 0       Fall Risk Assessment:  :     Fall Risk Assessment, last 12 mths 2018   Able to walk? Yes   Fall in past 12 months? No       Abuse Screening:  :     Abuse Screening Questionnaire 2018   Do you ever feel afraid of your partner?  N   Are you in a relationship with someone who physically or mentally threatens you? N   Is it safe for you to go home? Y       ADL Screening:  :     ADL Assessment 7/30/2018   Feeding yourself No Help Needed   Getting from bed to chair No Help Needed   Getting dressed No Help Needed   Bathing or showering No Help Needed   Walk across the room (includes cane/walker) No Help Needed   Using the telphone No Help Needed   Taking your medications No Help Needed   Preparing meals No Help Needed   Managing money (expenses/bills) No Help Needed   Moderately strenuous housework (laundry) No Help Needed   Shopping for personal items (toiletries/medicines) No Help Needed   Shopping for groceries No Help Needed   Driving No Help Needed   Climbing a flight of stairs No Help Needed   Getting to places beyond walking distances No Help Needed         Medication reconciliation up to date and corrected with patient at this time.

## 2019-03-07 NOTE — PROGRESS NOTES
HPI  Mr. Julio Stokes is a 60y.o. year old male, he is seen today for follow up HTN, DM. Since last visit had cardiac stents placed - stent of prox to distal LAD with AFUA x 2 - Dr. Altaf Granados 12/2018. EF 30% with LifeVest. Denies chest pain, sob, weakness. Saw cardiologist yesterday. Tries to walk 30 minutes daily. Told Dr. Altaf Granados yesterday that he has had some dizziness off and on. None today. For months has had intermittent diarrhea, attributes this to prasugrel - not sure. Takes immodium which helps. Seems to be better when he avoids dairy. Has lost 5# since 11/2018. Has been drinking beet juice and notes BP has been lower. Weight at home is 201# - has been losing weight. Glucose has been as low as 110-120. A1C today is 5.9 - was 7.1. Drinks mostly water. Hasn't gone back to work yet. Has cut back on sugar. Chief Complaint   Patient presents with    Blood Pressure Check     Room 2A// saw Dr Melecio Vázquez, was seen Monday, again in 1 month    Diabetes        Prior to Admission medications    Medication Sig Start Date End Date Taking? Authorizing Provider   atorvastatin (LIPITOR) 80 mg tablet  3/4/19  Yes Provider, Historical   carvedilol (COREG) 6.25 mg tablet TAKE 1 TABLET BY MOUTH TWICE A DAY 1/15/19  Yes Provider, Historical   prasugrel (EFFIENT) 10 mg tablet 10 mg daily. 3/4/19  Yes Provider, Historical   metFORMIN (GLUCOPHAGE) 500 mg tablet Take 1 Tab by mouth two (2) times daily (with meals). 3/7/19  Yes Mikey Sánchez MD   aspirin 81 mg chewable tablet Take 81 mg by mouth daily. Yes Provider, Historical   losartan (COZAAR) 25 mg tablet TAKE 1 TABLET BY MOUTH EVERY DAY 2/15/19  Yes Mikey Sánchez MD   amLODIPine (NORVASC) 5 mg tablet Take 1 Tab by mouth daily. 11/8/18  Yes Mikey Sánchez MD   amitriptyline (ELAVIL) 25 mg tablet Take 1 Tab by mouth nightly.  4/30/18  Yes Mikey Sánchez MD         No Known Allergies      REVIEW OF SYSTEMS:  Per HPI    PHYSICAL EXAM:  Visit Vitals  /73 (BP 1 Location: Left arm, BP Patient Position: Sitting)   Pulse 82   Temp 98.2 °F (36.8 °C) (Oral)   Resp 16   Ht 5' 9\" (1.753 m)   Wt 210 lb 3.2 oz (95.3 kg)   SpO2 95%   BMI 31.04 kg/m²     Constitutional: Appears well-developed and well-nourished. No distress. HENT:   Head: Normocephalic and atraumatic. Eyes: No scleral icterus. Neck: no lad, no tm, supple   Cardiovascular: Normal S1/S2, regular rhythm. No murmurs, rubs, or gallops. Pulmonary/Chest: Effort normal and breath sounds normal. No respiratory distress. No wheezes, rhonchi, or rales. Ext: No edema. Neurological: Alert. Psychiatric: Normal mood and affect. Behavior is normal.     Lab Results   Component Value Date/Time    Sodium 141 11/06/2018 09:47 AM    Potassium 5.2 11/06/2018 09:47 AM    Chloride 101 11/06/2018 09:47 AM    CO2 24 11/06/2018 09:47 AM    Glucose 100 (H) 11/06/2018 09:47 AM    BUN 12 11/06/2018 09:47 AM    Creatinine 0.91 11/06/2018 09:47 AM    BUN/Creatinine ratio 13 11/06/2018 09:47 AM    GFR est  11/06/2018 09:47 AM    GFR est non-AA 92 11/06/2018 09:47 AM    Calcium 9.7 11/06/2018 09:47 AM    Bilirubin, total 0.4 11/06/2018 09:47 AM    AST (SGOT) 19 11/06/2018 09:47 AM    Alk. phosphatase 99 11/06/2018 09:47 AM    Protein, total 7.3 11/06/2018 09:47 AM    Albumin 4.5 11/06/2018 09:47 AM    A-G Ratio 1.6 11/06/2018 09:47 AM    ALT (SGPT) 16 11/06/2018 09:47 AM     Lab Results   Component Value Date/Time    Hemoglobin A1c 7.1 (H) 11/06/2018 09:47 AM    Hemoglobin A1c 7.0 (H) 05/03/2018 10:03 AM    Hemoglobin A1c 7.5 (H) 06/08/2017 02:57 PM      Lab Results   Component Value Date/Time    Cholesterol, total 189 11/06/2018 09:47 AM    HDL Cholesterol 41 11/06/2018 09:47 AM    LDL, calculated 126 (H) 11/06/2018 09:47 AM    VLDL, calculated 22 11/06/2018 09:47 AM    Triglyceride 110 11/06/2018 09:47 AM          ASSESSMENT/PLAN  Diagnoses and all orders for this visit:    1.  Type 2 diabetes mellitus with complication, without long-term current use of insulin (HCC)  -     AMB POC HEMOGLOBIN A1C  -     metFORMIN (GLUCOPHAGE) 500 mg tablet; Take 1 Tab by mouth two (2) times daily (with meals). Excellent control - decrease metformin to 500mg bid   2. Coronary artery disease involving native coronary artery of native heart without angina pectoris   s/p stent - no symptoms - followed by Dr. Madelyn Castellanos  3. [facility]omyopathy, unspecified type ([facility]  Has LifeVest- sounds like has WAQAS scheduled - will get records  4. Essential hypertension  controlled  5. Mixed hyperlipidemia  On lipitor        Health Maintenance Due   Topic Date Due    DTaP/Tdap/Td series (1 - Tdap) 07/16/1980        Follow-up Disposition:  Return in about 4 months (around 7/7/2019) for bp, dm. Reviewed plan of care. Patient has provided input and agrees with goals. The nurse provided the patient and/or family with advanced directive information if needed and encouraged the patient to provide a copy to the office when available.

## 2019-04-22 RX ORDER — AMITRIPTYLINE HYDROCHLORIDE 25 MG/1
TABLET, FILM COATED ORAL
Qty: 90 TAB | Refills: 3 | Status: SHIPPED | OUTPATIENT
Start: 2019-04-22 | End: 2019-08-07

## 2019-08-07 ENCOUNTER — OFFICE VISIT (OUTPATIENT)
Dept: INTERNAL MEDICINE CLINIC | Facility: CLINIC | Age: 59
End: 2019-08-07

## 2019-08-07 VITALS
DIASTOLIC BLOOD PRESSURE: 77 MMHG | TEMPERATURE: 97.6 F | WEIGHT: 204.8 LBS | BODY MASS INDEX: 30.33 KG/M2 | OXYGEN SATURATION: 95 % | HEART RATE: 82 BPM | HEIGHT: 69 IN | SYSTOLIC BLOOD PRESSURE: 110 MMHG | RESPIRATION RATE: 16 BRPM

## 2019-08-07 DIAGNOSIS — R36.1 BLOOD IN SEMEN: Primary | ICD-10-CM

## 2019-08-07 LAB
BILIRUB UR QL STRIP: NEGATIVE
GLUCOSE UR-MCNC: NEGATIVE MG/DL
KETONES P FAST UR STRIP-MCNC: NEGATIVE MG/DL
PH UR STRIP: 5 [PH] (ref 4.6–8)
PROT UR QL STRIP: NEGATIVE
SP GR UR STRIP: 1.02 (ref 1–1.03)
UA UROBILINOGEN AMB POC: NORMAL (ref 0.2–1)
URINALYSIS CLARITY POC: CLEAR
URINALYSIS COLOR POC: YELLOW
URINE BLOOD POC: NEGATIVE
URINE LEUKOCYTES POC: NEGATIVE
URINE NITRITES POC: NEGATIVE

## 2019-08-07 NOTE — PROGRESS NOTES
CC:   Chief Complaint   Patient presents with    Blood In Semen     noticed it today       HISTORY OF PRESENT ILLNESS  Maria Ines Jarvis is a 60 y.o. male. His PCP is Dr. Julianne Brewster [address]. He complains of having one episode of noticing a small amount of bright red blood in his semen this morning. Denies dysuria, urgency, frequency, pelvic pain, fevers, chills, or penile discharge. Has occasional right femoral discomfort at site of catheter for cardiac catheterization. Occasional mild discomfort at right testicle and pain with ejaculation but has not had these symptoms recently. Patient Active Problem List   Diagnosis Code    Type 2 diabetes mellitus with complication, without long-term current use of insulin ([facility]) E11.8    Essential hypertension I10    Irritable bowel syndrome with diarrhea K58.0    Coronary artery disease involving native coronary artery of native heart without angina pectoris I25.10    Cardiomyopathy ([facility] I42.9     Past Medical History:   Diagnosis Date    Diabetes ([facility]     Hypertension      No Known Allergies    Current Outpatient Medications   Medication Sig Dispense Refill    atorvastatin (LIPITOR) 80 mg tablet       carvedilol (COREG) 6.25 mg tablet TAKE 1 TABLET BY MOUTH TWICE A DAY  4    prasugrel (EFFIENT) 10 mg tablet 10 mg daily.  metFORMIN (GLUCOPHAGE) 500 mg tablet Take 1 Tab by mouth two (2) times daily (with meals). 180 Tab 2    aspirin 81 mg chewable tablet Take 81 mg by mouth daily.  losartan (COZAAR) 25 mg tablet TAKE 1 TABLET BY MOUTH EVERY DAY 90 Tab 2    amLODIPine (NORVASC) 5 mg tablet Take 1 Tab by mouth daily. 30 Tab 11         PHYSICAL EXAM  Visit Vitals  /77 (BP 1 Location: Left arm, BP Patient Position: Sitting)   Pulse 82   Temp 97.6 °F (36.4 °C) (Oral)   Resp 16   Ht 5' 9\" (1.753 m)   Wt 204 lb 12.8 oz (92.9 kg)   SpO2 95%   BMI 30.24 kg/m²       General: Well-developed and well-nourished, no distress.   HEENT:  Head normocephalic/atraumatic, no scleral icterus  Neurological: Alert and oriented. Psychiatric: Normal mood and affect. Behavior is normal.     Results for orders placed or performed in visit on 08/07/19   AMB POC URINALYSIS DIP STICK AUTO W/O MICRO   Result Value Ref Range    Color (UA POC) Yellow     Clarity (UA POC) Clear     Glucose (UA POC) Negative Negative    Bilirubin (UA POC) Negative Negative    Ketones (UA POC) Negative Negative    Specific gravity (UA POC) 1.020 1.001 - 1.035    Blood (UA POC) Negative Negative    pH (UA POC) 5.0 4.6 - 8.0    Protein (UA POC) Negative Negative    Urobilinogen (UA POC) 0.2 mg/dL 0.2 - 1    Nitrites (UA POC) Negative Negative    Leukocyte esterase (UA POC) Negative Negative         ASSESSMENT AND PLAN    ICD-10-CM ICD-9-CM    1. Blood in semen R36.1 608.82 AMB POC URINALYSIS DIP STICK AUTO W/O MICRO      CT/NG/T.VAGINALIS AMPLIFICATION     Diagnoses and all orders for this visit:    1. Blood in semen  No evidence of UTI. Urine STD testing ordered. Patient reassured that this problem would likely resolve on its own. -     AMB POC URINALYSIS DIP STICK AUTO W/O MICRO  -     CT/NG/T.VAGINALIS AMPLIFICATION    He was given a copy of the following handout from the [facility]:    Blood in semen (hematospermia) can be frightening, but the cause of this uncommon condition is usually benign. Typically, blood in semen goes away on its own. If you're under age 36 and see blood in your semen, chances are it will resolve without treatment. However, it's a good idea to make an appointment with your doctor for a physical exam and simple tests to rule out a cause, such as a sexually transmitted infection. If you have certain risk factors and symptoms, further testing may be necessary to rule out a more serious underlying disorder.  Call your doctor about blood in semen if you:  Are 40 or older   Have blood in semen that persists longer than three to four weeks   Have repeatedly recurring blood in semen   Have other symptoms, such as painful urination   Have other risk factors  such as a history of cancer, bleeding disorders, or genital or urinary system malformation  or have recently engaged in behaviors that put you at risk of sexually transmitted infections      Follow-up and Dispositions    · Return in about 6 months (around 2/7/2020), or if symptoms worsen or fail to improve, for DM, HTN with Dr. Liss King (PCP). I have discussed the diagnosis with the patient and the intended plan as seen in the above orders. Patient is in agreement. The patient has received an after-visit summary and questions were answered concerning future plans. I have discussed medication side effects and warnings with the patient as well.

## 2019-08-07 NOTE — PROGRESS NOTES
David Orlando is 60 y.o. male    Chief Complaint   Patient presents with    Blood In Semen     noticed it today       Visit Vitals  /77 (BP 1 Location: Left arm, BP Patient Position: Sitting)   Pulse 82   Temp 97.6 °F (36.4 °C) (Oral)   Resp 16   Ht 5' 9\" (1.753 m)   Wt 204 lb 12.8 oz (92.9 kg)   SpO2 95%   BMI 30.24 kg/m²         1. Have you been to the ER, urgent care clinic or hospitalized since your last visit? No     2. Have you seen or consulted any other health care providers outside of the [address] [place] [address] since your last visit? Include any pap smears or colon screening. No            3 most recent PHQ Screens 8/7/2019   Little interest or pleasure in doing things Not at all   Feeling down, depressed, irritable, or hopeless Not at all   Total Score PHQ 2 0        Fall Risk Assessment, last 12 mths 8/7/2019   Able to walk? Yes   Fall in past 12 months? No        Abuse Screening Questionnaire 8/7/2019 7/30/2018   Do you ever feel afraid of your partner? N N   Are you in a relationship with someone who physically or mentally threatens you? N N   Is it safe for you to go home?  An Collado        ADL Assessment 8/7/2019   Feeding yourself No Help Needed   Getting from bed to chair No Help Needed   Getting dressed No Help Needed   Bathing or showering No Help Needed   Walk across the room (includes cane/walker) No Help Needed   Using the telphone No Help Needed   Taking your medications No Help Needed   Preparing meals No Help Needed   Managing money (expenses/bills) No Help Needed   Moderately strenuous housework (laundry) No Help Needed   Shopping for personal items (toiletries/medicines) No Help Needed   Shopping for groceries No Help Needed   Driving No Help Needed   Climbing a flight of stairs No Help Needed   Getting to places beyond walking distances No Help Needed

## 2019-08-09 LAB
C TRACH RRNA SPEC QL NAA+PROBE: NEGATIVE
N GONORRHOEA RRNA SPEC QL NAA+PROBE: NEGATIVE
T VAGINALIS RRNA VAG QL NAA+PROBE: NEGATIVE

## 2019-08-09 NOTE — PROGRESS NOTES
Message sent to patient by My Chart:  Your urine sexually transmitted disease (STD) testing returned normal.    Dr. Sarah Navas

## 2019-10-25 DIAGNOSIS — I10 ESSENTIAL HYPERTENSION: ICD-10-CM

## 2019-10-25 RX ORDER — AMLODIPINE BESYLATE 5 MG/1
TABLET ORAL
Qty: 90 TAB | Refills: 3 | Status: SHIPPED | OUTPATIENT
Start: 2019-10-25 | End: 2020-08-13 | Stop reason: SDUPTHER

## 2019-11-12 DIAGNOSIS — E11.8 TYPE 2 DIABETES MELLITUS WITH COMPLICATION, WITHOUT LONG-TERM CURRENT USE OF INSULIN (HCC): ICD-10-CM

## 2019-11-12 DIAGNOSIS — I10 ESSENTIAL HYPERTENSION: ICD-10-CM

## 2019-11-12 RX ORDER — LOSARTAN POTASSIUM 25 MG/1
TABLET ORAL
Qty: 90 TAB | Refills: 2 | Status: SHIPPED | OUTPATIENT
Start: 2019-11-12 | End: 2020-08-13 | Stop reason: SDUPTHER

## 2019-11-12 RX ORDER — METFORMIN HYDROCHLORIDE 500 MG/1
TABLET ORAL
Qty: 360 TAB | Refills: 2 | Status: SHIPPED | OUTPATIENT
Start: 2019-11-12 | End: 2020-02-04 | Stop reason: SDUPTHER

## 2020-02-04 ENCOUNTER — OFFICE VISIT (OUTPATIENT)
Dept: INTERNAL MEDICINE CLINIC | Facility: CLINIC | Age: 60
End: 2020-02-04

## 2020-02-04 VITALS
BODY MASS INDEX: 30.81 KG/M2 | TEMPERATURE: 98.3 F | SYSTOLIC BLOOD PRESSURE: 120 MMHG | WEIGHT: 208 LBS | RESPIRATION RATE: 14 BRPM | HEART RATE: 72 BPM | DIASTOLIC BLOOD PRESSURE: 76 MMHG | HEIGHT: 69 IN | OXYGEN SATURATION: 96 %

## 2020-02-04 DIAGNOSIS — G47.33 OSA ON CPAP: ICD-10-CM

## 2020-02-04 DIAGNOSIS — Z12.11 COLON CANCER SCREENING: ICD-10-CM

## 2020-02-04 DIAGNOSIS — E11.8 TYPE 2 DIABETES MELLITUS WITH COMPLICATION, WITHOUT LONG-TERM CURRENT USE OF INSULIN (HCC): Primary | ICD-10-CM

## 2020-02-04 DIAGNOSIS — E78.2 MIXED HYPERLIPIDEMIA: ICD-10-CM

## 2020-02-04 DIAGNOSIS — I25.10 CORONARY ARTERY DISEASE INVOLVING NATIVE CORONARY ARTERY OF NATIVE HEART WITHOUT ANGINA PECTORIS: ICD-10-CM

## 2020-02-04 DIAGNOSIS — Z12.5 PROSTATE CANCER SCREENING: ICD-10-CM

## 2020-02-04 DIAGNOSIS — I42.9 CARDIOMYOPATHY, UNSPECIFIED TYPE (HCC): ICD-10-CM

## 2020-02-04 DIAGNOSIS — Z97.4 WEARS HEARING AID: ICD-10-CM

## 2020-02-04 DIAGNOSIS — Z95.810 AICD (AUTOMATIC CARDIOVERTER/DEFIBRILLATOR) PRESENT: ICD-10-CM

## 2020-02-04 DIAGNOSIS — I10 ESSENTIAL HYPERTENSION: ICD-10-CM

## 2020-02-04 DIAGNOSIS — Z99.89 OSA ON CPAP: ICD-10-CM

## 2020-02-04 NOTE — PATIENT INSTRUCTIONS
[facility] [address] [facility] [place], [address] Phone: [phone] Fax: [phone]

## 2020-02-04 NOTE — PROGRESS NOTES
HPI  Mr. Coni Calzada is a 60y.o. year old male, he is seen today for follow up DM, HTN, NAVARRO. Got AICD in 2019 - Dr. Bennett Necessary. No chest pain, sob, dizziness, weakness. Occasionally will feel lightheaded off and on. Hasn't checked bp lately. Saw cardiology 2020. No changes made to medications. Just got cpap last week and has been using it. Just started walking again yesterday for exercise. Father  1/10/20 - sounds like CHF - was in hospice. Mother now living with him and his wife. Checks glucose occasionally - not recently. No blood in stool or melena, never had colonoscopy. No FH colon CA. Chief Complaint   Patient presents with    Diabetes     Room 2B// NON fasting     Hypertension    Sleep Apnea     just got cpap machine last week         Prior to Admission medications    Medication Sig Start Date End Date Taking? Authorizing Provider   cpap machine kit by Does Not Apply route. Yes Provider, Historical   losartan (COZAAR) 25 mg tablet TAKE 1 TABLET BY MOUTH EVERY DAY 19  Yes Matthew Hernandez MD   amLODIPine (NORVASC) 5 mg tablet TAKE 1 TABLET BY MOUTH EVERY DAY 10/25/19  Yes Matthew Hernandez MD   atorvastatin (LIPITOR) 80 mg tablet  3/4/19  Yes Provider, Historical   carvedilol (COREG) 6.25 mg tablet TAKE 1 TABLET BY MOUTH TWICE A DAY 1/15/19  Yes Provider, Historical   prasugrel (EFFIENT) 10 mg tablet 10 mg daily. 3/4/19  Yes Provider, Historical   metFORMIN (GLUCOPHAGE) 500 mg tablet Take 1 Tab by mouth two (2) times daily (with meals). 3/7/19  Yes Matthew Hernandez MD   aspirin 81 mg chewable tablet Take 81 mg by mouth daily.    Yes Provider, Historical   metFORMIN (GLUCOPHAGE) 500 mg tablet TAKE 2 TABLETS BY MOUTH TWICE A DAY WITH MEALS 19  Matthew Hernandez MD         No Known Allergies      REVIEW OF SYSTEMS:  Per HPI    PHYSICAL EXAM:  Visit Vitals  /70 (BP 1 Location: Left arm, BP Patient Position: Sitting)   Pulse 72   Temp 98.3 °F (36.8 °C) (Oral)   Resp 14   Ht 5' 9\" (1.753 m)   Wt 208 lb (94.3 kg)   SpO2 96%   BMI 30.72 kg/m²     Constitutional: Appears well-developed and well-nourished. No distress. HENT:   Head: Normocephalic and atraumatic. Eyes: No scleral icterus. Cardiovascular: Normal S1/S2, regular rhythm. No murmurs, rubs, or gallops. Pulmonary/Chest: Effort normal and breath sounds normal. No respiratory distress. No wheezes, rhonchi, or rales. Ext: No edema. Neurological: Alert. Psychiatric: Normal mood and affect. Behavior is normal.     Lab Results   Component Value Date/Time    Sodium 141 11/06/2018 09:47 AM    Potassium 5.2 11/06/2018 09:47 AM    Chloride 101 11/06/2018 09:47 AM    CO2 24 11/06/2018 09:47 AM    Glucose 100 (H) 11/06/2018 09:47 AM    BUN 12 11/06/2018 09:47 AM    Creatinine 0.91 11/06/2018 09:47 AM    BUN/Creatinine ratio 13 11/06/2018 09:47 AM    GFR est  11/06/2018 09:47 AM    GFR est non-AA 92 11/06/2018 09:47 AM    Calcium 9.7 11/06/2018 09:47 AM    Bilirubin, total 0.4 11/06/2018 09:47 AM    AST (SGOT) 19 11/06/2018 09:47 AM    Alk. phosphatase 99 11/06/2018 09:47 AM    Protein, total 7.3 11/06/2018 09:47 AM    Albumin 4.5 11/06/2018 09:47 AM    A-G Ratio 1.6 11/06/2018 09:47 AM    ALT (SGPT) 16 11/06/2018 09:47 AM     Lab Results   Component Value Date/Time    Hemoglobin A1c 7.1 (H) 11/06/2018 09:47 AM    Hemoglobin A1c 7.0 (H) 05/03/2018 10:03 AM    Hemoglobin A1c 7.5 (H) 06/08/2017 02:57 PM      Lab Results   Component Value Date/Time    Cholesterol, total 189 11/06/2018 09:47 AM    HDL Cholesterol 41 11/06/2018 09:47 AM    LDL, calculated 126 (H) 11/06/2018 09:47 AM    VLDL, calculated 22 11/06/2018 09:47 AM    Triglyceride 110 11/06/2018 09:47 AM          ASSESSMENT/PLAN  Diagnoses and all orders for this visit:    1.  Type 2 diabetes mellitus with complication, without long-term current use of insulin (HCC)  -     MICROALBUMIN, UR, RAND W/ MICROALB/CREAT RATIO  -     HEMOGLOBIN A1C WITH EAG  Check labs  2. BMI 30.0-30.9,adult  Add exercise  3. Essential hypertension  Controlled on current regimen, continue   4. Coronary artery disease involving native coronary artery of native heart without angina pectoris  Followed by cardiology  5. Cardiomyopathy, unspecified type ([facility] [address]  Has AICD  6. Wears hearing aid    7. NAVARRO on CPAP  compliant  8. AICD (automatic cardioverter/defibrillator) present    9. Colon cancer screening  -     COLOGUARD TEST (FECAL DNA COLORECTAL CANCER SCREENING)    10. Mixed hyperlipidemia  -     LIPID PANEL  -     METABOLIC PANEL, COMPREHENSIVE    11. Prostate cancer screening  -     PSA SCREENING (SCREENING)          Health Maintenance Due   Topic Date Due    Pneumococcal 0-64 years (1 of 1 - PPSV23) 07/16/1965    DTaP/Tdap/Td series (1 - Tdap) 07/16/1970    COLONOSCOPY  07/16/1977    FOOT EXAM Q1  07/30/2019    MICROALBUMIN Q1  07/30/2019    HEMOGLOBIN A1C Q6M  09/07/2019    LIPID PANEL Q1  11/06/2019               Reviewed plan of care. Patient has provided input and agrees with goals. The nurse provided the patient and/or family with advanced directive information if needed and encouraged the patient to provide a copy to the office when available.

## 2020-02-05 LAB
ALBUMIN SERPL-MCNC: 4.7 G/DL (ref 3.8–4.9)
ALBUMIN/CREAT UR: <3 MG/G CREAT (ref 0–29)
ALBUMIN/GLOB SERPL: 2 {RATIO} (ref 1.2–2.2)
ALP SERPL-CCNC: 149 IU/L (ref 39–117)
ALT SERPL-CCNC: 29 IU/L (ref 0–44)
AST SERPL-CCNC: 22 IU/L (ref 0–40)
BILIRUB SERPL-MCNC: 0.5 MG/DL (ref 0–1.2)
BUN SERPL-MCNC: 16 MG/DL (ref 8–27)
BUN/CREAT SERPL: 18 (ref 10–24)
CALCIUM SERPL-MCNC: 9.2 MG/DL (ref 8.6–10.2)
CHLORIDE SERPL-SCNC: 101 MMOL/L (ref 96–106)
CHOLEST SERPL-MCNC: 109 MG/DL (ref 100–199)
CO2 SERPL-SCNC: 23 MMOL/L (ref 20–29)
CREAT SERPL-MCNC: 0.9 MG/DL (ref 0.76–1.27)
CREAT UR-MCNC: 86.4 MG/DL
EST. AVERAGE GLUCOSE BLD GHB EST-MCNC: 157 MG/DL
GLOBULIN SER CALC-MCNC: 2.4 G/DL (ref 1.5–4.5)
GLUCOSE SERPL-MCNC: 101 MG/DL (ref 65–99)
HBA1C MFR BLD: 7.1 % (ref 4.8–5.6)
HDLC SERPL-MCNC: 37 MG/DL
LDLC SERPL CALC-MCNC: 52 MG/DL (ref 0–99)
MICROALBUMIN UR-MCNC: <3 UG/ML
POTASSIUM SERPL-SCNC: 4.2 MMOL/L (ref 3.5–5.2)
PROT SERPL-MCNC: 7.1 G/DL (ref 6–8.5)
PSA SERPL-MCNC: 0.6 NG/ML (ref 0–4)
SODIUM SERPL-SCNC: 142 MMOL/L (ref 134–144)
TRIGL SERPL-MCNC: 98 MG/DL (ref 0–149)
VLDLC SERPL CALC-MCNC: 20 MG/DL (ref 5–40)

## 2020-04-01 ENCOUNTER — TELEPHONE (OUTPATIENT)
Dept: INTERNAL MEDICINE CLINIC | Facility: CLINIC | Age: 60
End: 2020-04-01

## 2020-04-01 NOTE — TELEPHONE ENCOUNTER
----- Message from Alyssa Massey sent at 4/1/2020 10:29 AM EDT -----  Regarding: Dr. 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 Pt  says his heart is weaker than it was before and his cardiologist doesn't understand why.    Callback required yes/no and why: yes  Best contact number(s): [phone]  Details to clarify the request: n/a

## 2020-08-13 DIAGNOSIS — E11.8 TYPE 2 DIABETES MELLITUS WITH COMPLICATION, WITHOUT LONG-TERM CURRENT USE OF INSULIN (HCC): ICD-10-CM

## 2020-08-13 DIAGNOSIS — I10 ESSENTIAL HYPERTENSION: ICD-10-CM

## 2020-08-13 NOTE — TELEPHONE ENCOUNTER
PCP: Dinah Cruz MD     Last appt: Visit date not found   No future appointments. Requested Prescriptions     Pending Prescriptions Disp Refills    amLODIPine (NORVASC) 5 mg tablet 90 Tab 2     Sig: TAKE 1 TABLET BY MOUTH EVERY DAY    losartan (COZAAR) 25 mg tablet 90 Tab 2     Sig: TAKE 1 TABLET BY MOUTH EVERY DAY    metFORMIN (GLUCOPHAGE) 500 mg tablet 180 Tab 2     Sig: Take 1 Tab by mouth two (2) times daily (with meals).

## 2020-08-13 NOTE — TELEPHONE ENCOUNTER
[facility] on file sent a fax requesting a refill of   Requested Prescriptions     Pending Prescriptions Disp Refills    amLODIPine (NORVASC) 5 mg tablet 90 Tab 3    losartan (COZAAR) 25 mg tablet 90 Tab 2    metFORMIN (GLUCOPHAGE) 500 mg tablet 180 Tab 2     Sig: Take 1 Tab by mouth two (2) times daily (with meals).

## 2020-08-14 RX ORDER — AMLODIPINE BESYLATE 5 MG/1
TABLET ORAL
Qty: 90 TAB | Refills: 0 | Status: SHIPPED | OUTPATIENT
Start: 2020-08-14 | End: 2020-12-01

## 2020-08-14 RX ORDER — METFORMIN HYDROCHLORIDE 500 MG/1
500 TABLET ORAL 2 TIMES DAILY WITH MEALS
Qty: 180 TAB | Refills: 0 | Status: SHIPPED | OUTPATIENT
Start: 2020-08-14 | End: 2020-12-01

## 2020-08-14 RX ORDER — LOSARTAN POTASSIUM 25 MG/1
TABLET ORAL
Qty: 90 TAB | Refills: 0 | Status: SHIPPED | OUTPATIENT
Start: 2020-08-14 | End: 2020-11-09

## 2020-08-21 ENCOUNTER — VIRTUAL VISIT (OUTPATIENT)
Dept: INTERNAL MEDICINE CLINIC | Age: 60
End: 2020-08-21
Payer: COMMERCIAL

## 2020-08-21 DIAGNOSIS — R53.82 CHRONIC FATIGUE: ICD-10-CM

## 2020-08-21 DIAGNOSIS — I10 ESSENTIAL HYPERTENSION: ICD-10-CM

## 2020-08-21 DIAGNOSIS — I25.10 CORONARY ARTERY DISEASE INVOLVING NATIVE CORONARY ARTERY OF NATIVE HEART WITHOUT ANGINA PECTORIS: ICD-10-CM

## 2020-08-21 DIAGNOSIS — I42.9 CARDIOMYOPATHY, UNSPECIFIED TYPE (HCC): ICD-10-CM

## 2020-08-21 DIAGNOSIS — E11.8 TYPE 2 DIABETES MELLITUS WITH COMPLICATION, WITHOUT LONG-TERM CURRENT USE OF INSULIN (HCC): Primary | ICD-10-CM

## 2020-08-21 PROCEDURE — 99442 PR PHYS/QHP TELEPHONE EVALUATION 11-20 MIN: CPT | Performed by: INTERNAL MEDICINE

## 2020-08-21 RX ORDER — LEVOFLOXACIN 500 MG/1
TABLET, FILM COATED ORAL
COMMUNITY
Start: 2020-07-27 | End: 2020-12-17

## 2020-08-21 RX ORDER — ALFUZOSIN HYDROCHLORIDE 10 MG/1
TABLET, EXTENDED RELEASE ORAL
COMMUNITY
Start: 2020-07-27 | End: 2021-11-17

## 2020-08-21 NOTE — PROGRESS NOTES
Darron Edwards is a 63 y.o. male, evaluated via audio-only technology on 8/21/2020 for Medication Refill (Metformin and Losartan )  . Assessment & Plan:     Diagnoses and all orders for this visit:    1. Type 2 diabetes mellitus with complication, without long-term current use of insulin (HCC)  -     METABOLIC PANEL, COMPREHENSIVE; Future  -     HEMOGLOBIN A1C WITH EAG; Future  -     LIPID PANEL; Future  Unclear control, suggest checking glucose at least a few days per week - do not suspect metformin is causing back pain  2. Essential hypertension  -     METABOLIC PANEL, COMPREHENSIVE; Future  continue current medications   3. Coronary artery disease involving native coronary artery of native heart without angina pectoris  Followed by Dr. Leela Romero - has appt  4. Cardiomyopathy, unspecified type ([facility] [address]  See above  5. Chronic fatigue  -     CBC WITH AUTOMATED DIFF; Future  Has been more tired x weeks - tft's in march were normal - check cbc    Follow-up and Dispositions    · Return in about 4 months (around 12/21/2020) for bp, chol, dm.         12  Subjective:     DM - weight was as low as 198#, wasn't eating as much, now up to about 203# - stopped metformin night dose so is now taking once daily     HTN - not checking bp as much b/c has been good - no chest pain, sob, dizziness, weakness, edema   In march was waking with chest pain, stopped using cpap and no more chest pain - was admitted to [address] for chest pain and hasn't had since    High cholesterol - no recent lipids    Followed by cardiology - Dr. Leela Romero - has upcoming appt for CAD    Has been seeing Dr. Halina Peck urologist in [facility] - I don't have records yet. Has been seen for testicular pain - ?prostatitis. Prior to Admission medications    Medication Sig Start Date End Date Taking?  Authorizing Provider   levoFLOXacin (LEVAQUIN) 500 mg tablet  7/27/20  Yes Provider, Historical   amLODIPine (NORVASC) 5 mg tablet TAKE 1 TABLET BY MOUTH EVERY DAY 8/14/20  Yes Taylor Vyas MD   losartan (COZAAR) 25 mg tablet TAKE 1 TABLET BY MOUTH EVERY DAY 8/14/20  Yes Taylor Vyas MD   metFORMIN (GLUCOPHAGE) 500 mg tablet Take 1 Tab by mouth two (2) times daily (with meals). 8/14/20  Yes Taylor Vyas MD   cpap machine kit by Does Not Apply route. Yes Provider, Historical   atorvastatin (LIPITOR) 80 mg tablet  3/4/19  Yes Provider, Historical   carvedilol (COREG) 6.25 mg tablet TAKE 1 TABLET BY MOUTH TWICE A DAY 1/15/19  Yes Provider, Historical   prasugrel (EFFIENT) 10 mg tablet 10 mg daily. 3/4/19  Yes Provider, Historical   aspirin 81 mg chewable tablet Take 81 mg by mouth daily. Yes Provider, Historical   alfuzosin SR (UROXATRAL) 10 mg SR tablet  7/27/20   Provider, Historical         ROS  Per HPI  Patient-Reported Vitals 8/21/2020   Patient-Reported Weight 202lb        Syed Giordano, who was evaluated through a patient-initiated, synchronous (real-time) audio only encounter, and/or her healthcare decision maker, is aware that it is a billable service, with coverage as determined by his insurance carrier. He provided verbal consent to proceed: Yes. He has not had a related appointment within my department in the past 7 days or scheduled within the next 24 hours.       Total Time: minutes: 11-20 minutes    Yoli Mendoza MD

## 2020-08-21 NOTE — PROGRESS NOTES
Madeleine Lunsford  Identified pt with two pt identifiers(name and ). Chief Complaint   Patient presents with    Medication Refill     Metformin and Losartan        Reviewed record In preparation for visit and have obtained necessary documentation. 1. Have you been to the ER, urgent care clinic or hospitalized since your last visit? Yes. [address] ER     2. Have you seen or consulted any other health care providers outside of the [address] [place] [address] since your last visit? Include any pap smears or colon screening. No    Patient does not have an advance directive. Vitals reviewed with provider. Health Maintenance reviewed:     Health Maintenance Due   Topic    Pneumococcal 0-64 years (1 of 1 - PPSV23)    Colonoscopy     DTaP/Tdap/Td series (1 - Tdap)    Foot Exam Q1           Wt Readings from Last 3 Encounters:   20 208 lb (94.3 kg)   19 204 lb 12.8 oz (92.9 kg)   19 210 lb 3.2 oz (95.3 kg)        Temp Readings from Last 3 Encounters:   20 98.3 °F (36.8 °C) (Oral)   19 97.6 °F (36.4 °C) (Oral)   19 98.2 °F (36.8 °C) (Oral)        BP Readings from Last 3 Encounters:   20 120/76   19 110/77   19 118/73        Pulse Readings from Last 3 Encounters:   20 72   19 82   19 82      There were no vitals filed for this visit. Learning Assessment:   :       Learning Assessment 2020   PRIMARY LEARNER Patient   PRIMARY LANGUAGE ENGLISH   LEARNER PREFERENCE PRIMARY DEMONSTRATION   ANSWERED BY Patient   RELATIONSHIP SELF        Depression Screening:   :       3 most recent PHQ Screens 2020   Little interest or pleasure in doing things Several days   Feeling down, depressed, irritable, or hopeless Not at all   Total Score PHQ 2 1        Fall Risk Assessment:   :       Fall Risk Assessment, last 12 mths 2019   Able to walk? Yes   Fall in past 12 months?  No        Abuse Screening:   :       Abuse Screening Questionnaire 8/21/2020 8/7/2019 7/30/2018   Do you ever feel afraid of your partner? N N N   Are you in a relationship with someone who physically or mentally threatens you? N N N   Is it safe for you to go home?  Y Y Y        ADL Screening:   :       ADL Assessment 8/7/2019   Feeding yourself No Help Needed   Getting from bed to chair No Help Needed   Getting dressed No Help Needed   Bathing or showering No Help Needed   Walk across the room (includes cane/walker) No Help Needed   Using the telphone No Help Needed   Taking your medications No Help Needed   Preparing meals No Help Needed   Managing money (expenses/bills) No Help Needed   Moderately strenuous housework (laundry) No Help Needed   Shopping for personal items (toiletries/medicines) No Help Needed   Shopping for groceries No Help Needed   Driving No Help Needed   Climbing a flight of stairs No Help Needed   Getting to places beyond walking distances No Help Needed

## 2020-09-04 ENCOUNTER — TELEPHONE (OUTPATIENT)
Dept: INTERNAL MEDICINE CLINIC | Age: 60
End: 2020-09-04

## 2020-09-04 NOTE — TELEPHONE ENCOUNTER
Pt received lab orders in the mail and is having the labwork done at the [facility] where he works. He stated that they need another copy of the lab orders with Dr. Lynnette Ramirez signature on them so that they can be processed. Please fax new orders to [phone] attn: Luis Healy. Please give pt a call back at [phone] with any questions or for more info.

## 2020-11-09 DIAGNOSIS — I10 ESSENTIAL HYPERTENSION: ICD-10-CM

## 2020-11-09 RX ORDER — LOSARTAN POTASSIUM 25 MG/1
TABLET ORAL
Qty: 90 TAB | Refills: 0 | Status: SHIPPED | OUTPATIENT
Start: 2020-11-09 | End: 2020-12-01

## 2020-11-20 LAB — HBA1C MFR BLD HPLC: 6.6 %

## 2020-11-23 ENCOUNTER — DOCUMENTATION ONLY (OUTPATIENT)
Dept: INTERNAL MEDICINE CLINIC | Age: 60
End: 2020-11-23

## 2020-12-01 DIAGNOSIS — I10 ESSENTIAL HYPERTENSION: ICD-10-CM

## 2020-12-01 DIAGNOSIS — E11.8 TYPE 2 DIABETES MELLITUS WITH COMPLICATION, WITHOUT LONG-TERM CURRENT USE OF INSULIN (HCC): ICD-10-CM

## 2020-12-01 RX ORDER — AMLODIPINE BESYLATE 5 MG/1
TABLET ORAL
Qty: 90 TAB | Refills: 0 | Status: SHIPPED | OUTPATIENT
Start: 2020-12-01 | End: 2021-03-02

## 2020-12-01 RX ORDER — LOSARTAN POTASSIUM 25 MG/1
TABLET ORAL
Qty: 90 TAB | Refills: 0 | Status: SHIPPED | OUTPATIENT
Start: 2020-12-01 | End: 2021-04-11

## 2020-12-01 RX ORDER — METFORMIN HYDROCHLORIDE 500 MG/1
TABLET ORAL
Qty: 180 TAB | Refills: 0 | Status: SHIPPED | OUTPATIENT
Start: 2020-12-01 | End: 2021-03-03

## 2020-12-17 ENCOUNTER — OFFICE VISIT (OUTPATIENT)
Dept: INTERNAL MEDICINE CLINIC | Age: 60
End: 2020-12-17
Payer: COMMERCIAL

## 2020-12-17 VITALS
HEIGHT: 69 IN | BODY MASS INDEX: 31.46 KG/M2 | TEMPERATURE: 98.2 F | HEART RATE: 85 BPM | DIASTOLIC BLOOD PRESSURE: 72 MMHG | RESPIRATION RATE: 16 BRPM | OXYGEN SATURATION: 93 % | SYSTOLIC BLOOD PRESSURE: 112 MMHG | WEIGHT: 212.4 LBS

## 2020-12-17 DIAGNOSIS — I10 ESSENTIAL HYPERTENSION: ICD-10-CM

## 2020-12-17 DIAGNOSIS — E78.2 MIXED HYPERLIPIDEMIA: ICD-10-CM

## 2020-12-17 DIAGNOSIS — G47.33 OBSTRUCTIVE SLEEP APNEA TREATED WITH BIPAP: ICD-10-CM

## 2020-12-17 DIAGNOSIS — I25.10 CORONARY ARTERY DISEASE INVOLVING NATIVE CORONARY ARTERY OF NATIVE HEART WITHOUT ANGINA PECTORIS: ICD-10-CM

## 2020-12-17 DIAGNOSIS — Z23 ENCOUNTER FOR IMMUNIZATION: ICD-10-CM

## 2020-12-17 DIAGNOSIS — E11.8 TYPE 2 DIABETES MELLITUS WITH COMPLICATION, WITHOUT LONG-TERM CURRENT USE OF INSULIN (HCC): Primary | ICD-10-CM

## 2020-12-17 PROCEDURE — 99214 OFFICE O/P EST MOD 30 MIN: CPT | Performed by: INTERNAL MEDICINE

## 2020-12-17 PROCEDURE — 90732 PPSV23 VACC 2 YRS+ SUBQ/IM: CPT | Performed by: INTERNAL MEDICINE

## 2020-12-17 PROCEDURE — 90471 IMMUNIZATION ADMIN: CPT | Performed by: INTERNAL MEDICINE

## 2020-12-17 RX ORDER — DICLOFENAC SODIUM 75 MG/1
TABLET, DELAYED RELEASE ORAL
COMMUNITY
Start: 2020-12-10 | End: 2020-12-17

## 2020-12-17 NOTE — PATIENT INSTRUCTIONS
Vaccine Information Statement Pneumococcal Polysaccharide Vaccine (PPSV23): What You Need to Know Many Vaccine Information Statements are available in [place] and other languages. See www.immunize.org/vis Hojas de información sobre vacunas están disponibles en español y en muchos otros idiomas. Visite www.immunize.org/vis 1. Why get vaccinated? Pneumococcal polysaccharide vaccine (PPSV23) can prevent pneumococcal disease. Pneumococcal disease refers to any illness caused by pneumococcal bacteria. These bacteria can cause many types of illnesses, including pneumonia, which is an infection of the lungs. Pneumococcal bacteria are one of the most common causes of pneumonia. Besides pneumonia, pneumococcal bacteria can also cause: 
 Ear infections  Sinus infections  Meningitis (infection of the tissue covering the brain and spinal cord)  Bacteremia (bloodstream infection) Anyone can get pneumococcal disease, but children under 3years of age, people with certain medical conditions, adults 72 years or older, and cigarette smokers are at the highest risk. Most pneumococcal infections are mild. However, some can result in long-term problems, such as brain damage or hearing loss. Meningitis, bacteremia, and pneumonia caused by pneumococcal disease can be fatal.  
 
2. PPSV23  
 
PPSV23 protects against 23 types of bacteria that cause pneumococcal disease. PPSV23 is recommended for:  All adults 72 years or older,  Anyone 2 years or older with certain medical conditions that can lead to an increased risk for pneumococcal disease. Most people need only one dose of PPSV23. A second dose of PPSV23, and another type of pneumococcal vaccine called PCV13, are recommended for certain high-risk groups. Your health care provider can give you more information. People 65 years or older should get a dose of PPSV23 even if they have already gotten one or more doses of the vaccine before they turned 72. 
 
3. Talk with your health care provider Tell your vaccine provider if the person getting the vaccine: 
 Has had an allergic reaction after a previous dose of PPSV23, or has any severe, life-threatening allergies. In some cases, your health care provider may decide to postpone PPSV23 vaccination to a future visit. People with minor illnesses, such as a cold, may be vaccinated. People who are moderately or severely ill should usually wait until they recover before getting PPSV23. Your health care provider can give you more information. 4. Risks of a vaccine reaction  Redness or pain where the shot is given, feeling tired, fever, or muscle aches can happen after PPSV23. People sometimes faint after medical procedures, including vaccination. Tell your provider if you feel dizzy or have vision changes or ringing in the ears. As with any medicine, there is a very remote chance of a vaccine causing a severe allergic reaction, other serious injury, or death. 5. What if there is a serious problem? An allergic reaction could occur after the vaccinated person leaves the clinic. If you see signs of a severe allergic reaction (hives, swelling of the face and throat, difficulty breathing, a fast heartbeat, dizziness, or weakness), call 9-1-1 and get the person to the nearest hospital. 
 
For other signs that concern you, call your health care provider. Adverse reactions should be reported to the Vaccine Adverse Event Reporting System (VAERS). Your health care provider will usually file this report, or you can do it yourself. Visit the VAERS website at www.vaers. hhs.gov or call 6-892.104.8537. VAERS is only for reporting reactions, and VAERS staff do not give medical advice. 6. How can I learn more?  Ask your health care provider.  Call your local or state health department.  Contact the Centers for Disease Control and Prevention (CDC): 
- Call 0-551.268.8134 (1-800-CDC-INFO) or 
- Visit CDCs website at www.cdc.gov/vaccines Vaccine Information Statement PPSV23  
10/30/2019 Department of [facility] and [employer] Centers for Disease Control and Prevention Office Use Only

## 2020-12-17 NOTE — PROGRESS NOTES
Ron Dumont  Identified pt with two pt identifiers(name and ). Chief Complaint   Patient presents with    Blood Pressure Check     Room 2C //     Cholesterol Problem    Diabetes       Reviewed record In preparation for visit and have obtained necessary documentation. 1. Have you been to the ER, urgent care clinic or hospitalized since your last visit? No     2. Have you seen or consulted any other health care providers outside of the [address] [place] [address] since your last visit? Include any pap smears or colon screening. No    Patient does not have an advance directive. Vitals reviewed with provider.     Health Maintenance reviewed:     Health Maintenance Due   Topic    Pneumococcal 0-64 years (1 of 1 - PPSV23)    DTaP/Tdap/Td series (1 - Tdap)    Colorectal Cancer Screening Combo     Foot Exam Q1           Wt Readings from Last 3 Encounters:   20 212 lb 6.4 oz (96.3 kg)   20 208 lb (94.3 kg)   19 204 lb 12.8 oz (92.9 kg)        Temp Readings from Last 3 Encounters:   20 98.2 °F (36.8 °C) (Oral)   20 98.3 °F (36.8 °C) (Oral)   19 97.6 °F (36.4 °C) (Oral)        BP Readings from Last 3 Encounters:   20 112/72   20 120/76   19 110/77        Pulse Readings from Last 3 Encounters:   20 85   20 72   19 82        Vitals:    20 1500   BP: 112/72   Pulse: 85   Resp: 16   Temp: 98.2 °F (36.8 °C)   TempSrc: Oral   SpO2: 93%   Weight: 212 lb 6.4 oz (96.3 kg)   Height: 5' 9\" (1.753 m)   PainSc:   2   PainLoc: Back          Learning Assessment:   :       Learning Assessment 2020   PRIMARY LEARNER Patient   PRIMARY LANGUAGE ENGLISH   LEARNER PREFERENCE PRIMARY DEMONSTRATION   ANSWERED BY Patient   RELATIONSHIP SELF        Depression Screening:   :       3 most recent PHQ Screens 2020   Little interest or pleasure in doing things Several days   Feeling down, depressed, irritable, or hopeless Not at all   Total Score PHQ 2 1        Fall Risk Assessment:   :       Fall Risk Assessment, last 12 mths 8/7/2019   Able to walk? Yes   Fall in past 12 months? No        Abuse Screening:   :       Abuse Screening Questionnaire 8/21/2020 8/7/2019 7/30/2018   Do you ever feel afraid of your partner? N N N   Are you in a relationship with someone who physically or mentally threatens you? N N N   Is it safe for you to go home?  Y Y Y        ADL Screening:   :       ADL Assessment 8/7/2019   Feeding yourself No Help Needed   Getting from bed to chair No Help Needed   Getting dressed No Help Needed   Bathing or showering No Help Needed   Walk across the room (includes cane/walker) No Help Needed   Using the telphone No Help Needed   Taking your medications No Help Needed   Preparing meals No Help Needed   Managing money (expenses/bills) No Help Needed   Moderately strenuous housework (laundry) No Help Needed   Shopping for personal items (toiletries/medicines) No Help Needed   Shopping for groceries No Help Needed   Driving No Help Needed   Climbing a flight of stairs No Help Needed   Getting to places beyond walking distances No Help Needed

## 2021-03-02 DIAGNOSIS — I10 ESSENTIAL HYPERTENSION: ICD-10-CM

## 2021-03-02 DIAGNOSIS — E11.8 TYPE 2 DIABETES MELLITUS WITH COMPLICATION, WITHOUT LONG-TERM CURRENT USE OF INSULIN (HCC): ICD-10-CM

## 2021-03-02 RX ORDER — AMLODIPINE BESYLATE 5 MG/1
TABLET ORAL
Qty: 90 TAB | Refills: 0 | Status: SHIPPED | OUTPATIENT
Start: 2021-03-02 | End: 2021-05-26

## 2021-03-02 NOTE — TELEPHONE ENCOUNTER
PCP: Jesus Felix MD     Last appt: 12/17/2020   Future Appointments   Date Time Provider Bill Davis   6/17/2021  3:00 PM Jesus Felix MD [facility] AMB        Requested Prescriptions     Pending Prescriptions Disp Refills    amLODIPine (NORVASC) 5 mg tablet [Pharmacy Med Name: AMLODIPINE BESYLATE 5 MG TAB] 90 Tab 0     Sig: TAKE 1 TABLET BY MOUTH EVERY DAY

## 2021-03-03 RX ORDER — METFORMIN HYDROCHLORIDE 500 MG/1
TABLET ORAL
Qty: 180 TAB | Refills: 0 | Status: SHIPPED | OUTPATIENT
Start: 2021-03-03 | End: 2021-05-26

## 2021-03-03 NOTE — TELEPHONE ENCOUNTER
REFILL     PCP: Rian Burkitt, MD     Last appt: 12/17/2020   Future Appointments   Date Time Provider Bill Davis   6/17/2021  3:00 PM Rian Burkitt, MD [facility] AMB        Requested Prescriptions     Pending Prescriptions Disp Refills    metFORMIN (GLUCOPHAGE) 500 mg tablet [Pharmacy Med Name: METFORMIN  MG TABLET] 180 Tab 0     Sig: TAKE 1 TABLET BY MOUTH TWICE A DAY WITH MEALS

## 2021-04-11 DIAGNOSIS — I10 ESSENTIAL HYPERTENSION: ICD-10-CM

## 2021-04-11 RX ORDER — LOSARTAN POTASSIUM 25 MG/1
TABLET ORAL
Qty: 90 TAB | Refills: 0 | Status: SHIPPED | OUTPATIENT
Start: 2021-04-11 | End: 2021-07-04

## 2021-05-26 DIAGNOSIS — I10 ESSENTIAL HYPERTENSION: ICD-10-CM

## 2021-05-26 DIAGNOSIS — E11.8 TYPE 2 DIABETES MELLITUS WITH COMPLICATION, WITHOUT LONG-TERM CURRENT USE OF INSULIN (HCC): ICD-10-CM

## 2021-05-26 RX ORDER — AMLODIPINE BESYLATE 5 MG/1
TABLET ORAL
Qty: 90 TABLET | Refills: 0 | Status: SHIPPED | OUTPATIENT
Start: 2021-05-26 | End: 2021-08-17

## 2021-05-26 RX ORDER — METFORMIN HYDROCHLORIDE 500 MG/1
TABLET ORAL
Qty: 180 TABLET | Refills: 0 | Status: SHIPPED | OUTPATIENT
Start: 2021-05-26 | End: 2021-08-17

## 2021-06-08 ENCOUNTER — TELEPHONE (OUTPATIENT)
Dept: INTERNAL MEDICINE CLINIC | Age: 61
End: 2021-06-08

## 2021-06-08 NOTE — TELEPHONE ENCOUNTER
----- Message from Arin Mayer sent at 6/8/2021 10:45 AM EDT -----  Regarding: Dr Clover Lanier telephone  General Message/Vendor Calls    Caller's first and last name:Shane Santoyod      Reason for call:Signed Lab order request      Callback required yes/no and why:yes to confirms request      Best contact number(s):[phone]      Details to clarify the request:Pt is requesting for a signed lab request form to be faxed to  the [facility] at [phone].  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

## 2021-06-09 LAB — HBA1C MFR BLD HPLC: 6.9 %

## 2021-07-03 DIAGNOSIS — I10 ESSENTIAL HYPERTENSION: ICD-10-CM

## 2021-07-04 RX ORDER — LOSARTAN POTASSIUM 25 MG/1
TABLET ORAL
Qty: 90 TABLET | Refills: 0 | Status: SHIPPED | OUTPATIENT
Start: 2021-07-04 | End: 2021-09-28

## 2021-09-28 DIAGNOSIS — I10 ESSENTIAL HYPERTENSION: ICD-10-CM

## 2021-09-28 RX ORDER — LOSARTAN POTASSIUM 25 MG/1
TABLET ORAL
Qty: 90 TABLET | Refills: 0 | Status: SHIPPED | OUTPATIENT
Start: 2021-09-28

## 2021-11-17 ENCOUNTER — OFFICE VISIT (OUTPATIENT)
Dept: INTERNAL MEDICINE CLINIC | Age: 61
End: 2021-11-17
Payer: COMMERCIAL

## 2021-11-17 VITALS
OXYGEN SATURATION: 96 % | DIASTOLIC BLOOD PRESSURE: 82 MMHG | WEIGHT: 209.5 LBS | RESPIRATION RATE: 12 BRPM | HEART RATE: 80 BPM | HEIGHT: 69 IN | BODY MASS INDEX: 31.03 KG/M2 | TEMPERATURE: 98.3 F | SYSTOLIC BLOOD PRESSURE: 126 MMHG

## 2021-11-17 DIAGNOSIS — G47.33 OBSTRUCTIVE SLEEP APNEA TREATED WITH BIPAP: ICD-10-CM

## 2021-11-17 DIAGNOSIS — E11.8 TYPE 2 DIABETES MELLITUS WITH COMPLICATION, WITHOUT LONG-TERM CURRENT USE OF INSULIN (HCC): ICD-10-CM

## 2021-11-17 DIAGNOSIS — I10 ESSENTIAL HYPERTENSION: ICD-10-CM

## 2021-11-17 DIAGNOSIS — Z00.00 WELL ADULT EXAM: Primary | ICD-10-CM

## 2021-11-17 DIAGNOSIS — Z95.0 PACEMAKER: ICD-10-CM

## 2021-11-17 LAB — HBA1C MFR BLD HPLC: 6.3 %

## 2021-11-17 PROCEDURE — 99396 PREV VISIT EST AGE 40-64: CPT | Performed by: INTERNAL MEDICINE

## 2021-11-17 PROCEDURE — 99213 OFFICE O/P EST LOW 20 MIN: CPT | Performed by: INTERNAL MEDICINE

## 2021-11-17 PROCEDURE — 83036 HEMOGLOBIN GLYCOSYLATED A1C: CPT | Performed by: INTERNAL MEDICINE

## 2021-11-17 RX ORDER — CLOPIDOGREL BISULFATE 75 MG/1
75 TABLET ORAL DAILY
COMMUNITY
Start: 2021-08-23 | End: 2022-10-28

## 2021-11-17 RX ORDER — AMIODARONE HYDROCHLORIDE 200 MG/1
200 TABLET ORAL DAILY
COMMUNITY
Start: 2021-05-07

## 2021-11-17 RX ORDER — CARVEDILOL 3.12 MG/1
3.12 TABLET ORAL 2 TIMES DAILY
COMMUNITY
Start: 2021-09-17

## 2021-11-17 NOTE — PROGRESS NOTES
HPI  Mr. Smiley Patel is a 57y.o. year old male, he is seen today for well visit, follow up DM, HTN. Patient is followed by cardiology and urology as well. Now has pacemaker with AICD - mid July was placed- cardiologist Dr. Tunde Cain. Says HR as low as 40s prior and as high as 220. Says energy is improved - no chest pain or sob. Walks and bikes some for exercise. No change in bowels, melena or brbpr. Wants to wait on colonoscopy for now. Not sure if due. Urologist = due in December - [facility]    DM - hasn't checked glucose lately, hasn't been eating as well lately    HTN - notes intermittent blurry vision, has had glasses adjusted - hasn't checked in a few weeks - no HA since glasses adjusted  -120/70-80 normally    Stopped using bipap after experience when power went out, it stopped working and he woke up gasping. Chief Complaint   Patient presents with    Physical    Diabetes        Prior to Admission medications    Medication Sig Start Date End Date Taking? Authorizing Provider   clopidogreL (PLAVIX) 75 mg tab 75 mg daily. 8/23/21  Yes Provider, Historical   carvediloL (COREG) 3.125 mg tablet Take 3.125 mg by mouth two (2) times a day. 9/17/21  Yes Provider, Historical   amiodarone (CORDARONE) 200 mg tablet Take 200 mg by mouth daily. 5/7/21  Yes Provider, Historical   losartan (COZAAR) 25 mg tablet TAKE 1 TABLET BY MOUTH EVERY DAY 9/28/21  Yes Annamaria Tran MD   metFORMIN (GLUCOPHAGE) 500 mg tablet TAKE 1 TABLET BY MOUTH TWICE A DAY WITH MEALS 8/17/21  Yes Annamaria Tran MD   atorvastatin (LIPITOR) 80 mg tablet Take 80 mg by mouth daily. 3/4/19  Yes Provider, Historical   aspirin 81 mg chewable tablet Take 81 mg by mouth daily.    Yes Provider, Historical   amLODIPine (NORVASC) 5 mg tablet TAKE 1 TABLET BY MOUTH EVERY DAY 8/17/21 11/17/21  Annamaria Tran MD   alfuzosin SR (UROXATRAL) 10 mg SR tablet  7/27/20 11/17/21  Provider, Historical   cpap machine kit by Does Not Apply route. Provider, Historical         No Known Allergies      REVIEW OF SYSTEMS:  Per HPI    PHYSICAL EXAM:  Visit Vitals  /82   Pulse 80   Temp 98.3 °F (36.8 °C) (Oral)   Resp 12   Ht 5' 9\" (1.753 m)   Wt 209 lb 8 oz (95 kg)   SpO2 96%   BMI 30.94 kg/m²     Constitutional: Appears well-developed and well-nourished. No distress. HENT:   Head: Normocephalic and atraumatic. Eyes: No scleral icterus. Neck: no lad, no tm, supple   Cardiovascular: Normal S1/S2, regular rhythm. No murmurs, rubs, or gallops. Pulmonary/Chest: Effort normal and breath sounds normal. No respiratory distress. No wheezes, rhonchi, or rales. Abdomen: Soft, NT/ND, +BS, no rebound or guarding, no masses, no HSM appreciated. Ext: No edema. Neurological: Alert. Psychiatric: Normal mood and affect. Behavior is normal.    DM foot exam: 2+ pedal pulses, no lesions, sensation intact to monofilament b/l    Lab Results   Component Value Date/Time    Sodium 142 02/04/2020 04:06 PM    Potassium 4.2 02/04/2020 04:06 PM    Chloride 101 02/04/2020 04:06 PM    CO2 23 02/04/2020 04:06 PM    Glucose 101 (H) 02/04/2020 04:06 PM    BUN 16 02/04/2020 04:06 PM    Creatinine 0.90 02/04/2020 04:06 PM    BUN/Creatinine ratio 18 02/04/2020 04:06 PM    GFR est  02/04/2020 04:06 PM    GFR est non-AA 93 02/04/2020 04:06 PM    Calcium 9.2 02/04/2020 04:06 PM    Bilirubin, total 0.5 02/04/2020 04:06 PM    Alk.  phosphatase 149 (H) 02/04/2020 04:06 PM    Protein, total 7.1 02/04/2020 04:06 PM    Albumin 4.7 02/04/2020 04:06 PM    A-G Ratio 2.0 02/04/2020 04:06 PM    ALT (SGPT) 29 02/04/2020 04:06 PM     Lab Results   Component Value Date/Time    Hemoglobin A1c 7.1 (H) 02/04/2020 04:06 PM    Hemoglobin A1c 7.1 (H) 11/06/2018 09:47 AM    Hemoglobin A1c 7.0 (H) 05/03/2018 10:03 AM    Hemoglobin A1c, External 6.9 06/09/2021 12:00 AM    Hemoglobin A1c, External 6.6 11/20/2020 12:00 AM      Lab Results   Component Value Date/Time    Cholesterol, total 109 02/04/2020 04:06 PM    HDL Cholesterol 37 (L) 02/04/2020 04:06 PM    LDL, calculated 52 02/04/2020 04:06 PM    VLDL, calculated 20 02/04/2020 04:06 PM    Triglyceride 98 02/04/2020 04:06 PM          ASSESSMENT/PLAN  Diagnoses and all orders for this visit:    1. Well adult exam    2. Type 2 diabetes mellitus with complication, without long-term current use of insulin (HCC)  -     AMB POC HEMOGLOBIN A1C  -     HM DIABETES FOOT EXAM  -     MICROALBUMIN, UR, RAND W/ MICROALB/CREAT RATIO; Future    3. Essential hypertension    4. Obstructive sleep apnea treated with BiPAP    5. Pacemaker    DM well controlled - continue current medications   Encouraged follow up about NAVARRO in future  bp at goal  - continue current medications   Will get notes from Dr. Laurita Bennett Maintenance Due   Topic Date Due    COVID-19 Vaccine (1) Never done    DTaP/Tdap/Td series (1 - Tdap) Never done    Colorectal Cancer Screening Combo  Never done    MICROALBUMIN Q1  02/04/2021    Lipid Screen  02/04/2021        Follow-up and Dispositions    · Return in about 6 months (around 5/17/2022) for dm. Reviewed plan of care. Patient has provided input and agrees with goals. The nurse provided the patient and/or family with advanced directive information if needed and encouraged the patient to provide a copy to the office when available.

## 2021-11-17 NOTE — PROGRESS NOTES
Libby Cole  Identified pt with two pt identifiers(name and ). Chief Complaint   Patient presents with    Physical    Diabetes       Reviewed record In preparation for visit and have obtained necessary documentation. 1. Have you been to the ER, urgent care clinic or hospitalized since your last visit? No     2. Have you seen or consulted any other health care providers outside of the [address] [place] [address] since your last visit? Include any pap smears or colon screening. No    Vitals reviewed with provider.     Health Maintenance reviewed:     Health Maintenance Due   Topic    COVID-19 Vaccine (1)    DTaP/Tdap/Td series (1 - Tdap)    Colorectal Cancer Screening Combo     Shingrix Vaccine Age 50> (1 of 2)    Foot Exam Q1     MICROALBUMIN Q1     Lipid Screen     Flu Vaccine (1)          Wt Readings from Last 3 Encounters:   21 209 lb 8 oz (95 kg)   20 212 lb 6.4 oz (96.3 kg)   20 208 lb (94.3 kg)        Temp Readings from Last 3 Encounters:   21 98.3 °F (36.8 °C) (Oral)   20 98.2 °F (36.8 °C) (Oral)   20 98.3 °F (36.8 °C) (Oral)        BP Readings from Last 3 Encounters:   21 (!) 149/89   20 112/72   20 120/76        Pulse Readings from Last 3 Encounters:   21 80   20 85   20 72        Vitals:    21 1501   BP: (!) 149/89   Pulse: 80   Resp: 12   Temp: 98.3 °F (36.8 °C)   TempSrc: Oral   SpO2: 96%   Weight: 209 lb 8 oz (95 kg)   Height: 5' 9\" (1.753 m)   PainSc:   0 - No pain          Learning Assessment:   :       Learning Assessment 2020   PRIMARY LEARNER Patient   PRIMARY LANGUAGE ENGLISH   LEARNER PREFERENCE PRIMARY DEMONSTRATION   ANSWERED BY Patient   RELATIONSHIP SELF        Depression Screening:   :       3 most recent PHQ Screens 2021   Little interest or pleasure in doing things Not at all   Feeling down, depressed, irritable, or hopeless Not at all   Total Score PHQ 2 0        Fall Risk Assessment: :       Fall Risk Assessment, last 12 mths 8/7/2019   Able to walk? Yes   Fall in past 12 months? No        Abuse Screening:   :       Abuse Screening Questionnaire 11/17/2021 8/21/2020 8/7/2019 7/30/2018   Do you ever feel afraid of your partner? N N N N   Are you in a relationship with someone who physically or mentally threatens you? N N N N   Is it safe for you to go home?  Y Y Y Y        ADL Screening:   :       ADL Assessment 8/7/2019   Feeding yourself No Help Needed   Getting from bed to chair No Help Needed   Getting dressed No Help Needed   Bathing or showering No Help Needed   Walk across the room (includes cane/walker) No Help Needed   Using the telphone No Help Needed   Taking your medications No Help Needed   Preparing meals No Help Needed   Managing money (expenses/bills) No Help Needed   Moderately strenuous housework (laundry) No Help Needed   Shopping for personal items (toiletries/medicines) No Help Needed   Shopping for groceries No Help Needed   Driving No Help Needed   Climbing a flight of stairs No Help Needed   Getting to places beyond walking distances No Help Needed

## 2021-11-18 LAB
ALBUMIN/CREAT UR: 4 MG/G CREAT (ref 0–29)
CREAT UR-MCNC: 97.2 MG/DL
MICROALBUMIN UR-MCNC: 3.8 UG/ML

## 2021-11-21 DIAGNOSIS — E11.8 TYPE 2 DIABETES MELLITUS WITH COMPLICATION, WITHOUT LONG-TERM CURRENT USE OF INSULIN (HCC): ICD-10-CM

## 2021-11-21 RX ORDER — METFORMIN HYDROCHLORIDE 500 MG/1
TABLET ORAL
Qty: 180 TABLET | Refills: 0 | Status: SHIPPED | OUTPATIENT
Start: 2021-11-21 | End: 2022-02-20

## 2022-02-19 DIAGNOSIS — E11.8 TYPE 2 DIABETES MELLITUS WITH COMPLICATION, WITHOUT LONG-TERM CURRENT USE OF INSULIN (HCC): ICD-10-CM

## 2022-02-20 RX ORDER — METFORMIN HYDROCHLORIDE 500 MG/1
TABLET ORAL
Qty: 180 TABLET | Refills: 0 | Status: SHIPPED | OUTPATIENT
Start: 2022-02-20 | End: 2022-05-25

## 2022-03-18 PROBLEM — Z95.810 AICD (AUTOMATIC CARDIOVERTER/DEFIBRILLATOR) PRESENT: Status: ACTIVE | Noted: 2020-02-04

## 2022-03-18 PROBLEM — G47.33 OBSTRUCTIVE SLEEP APNEA TREATED WITH BIPAP: Status: ACTIVE | Noted: 2020-02-04

## 2022-03-18 PROBLEM — I25.10 CORONARY ARTERY DISEASE INVOLVING NATIVE CORONARY ARTERY OF NATIVE HEART WITHOUT ANGINA PECTORIS: Status: ACTIVE | Noted: 2019-03-07

## 2022-03-19 PROBLEM — Z97.4 WEARS HEARING AID: Status: ACTIVE | Noted: 2020-02-04

## 2022-03-19 PROBLEM — I42.9 CARDIOMYOPATHY (HCC): Status: ACTIVE | Noted: 2019-03-07

## 2022-03-19 PROBLEM — E11.8 TYPE 2 DIABETES MELLITUS WITH COMPLICATION, WITHOUT LONG-TERM CURRENT USE OF INSULIN (HCC): Status: ACTIVE | Noted: 2017-06-08

## 2022-03-19 PROBLEM — K58.0 IRRITABLE BOWEL SYNDROME WITH DIARRHEA: Status: ACTIVE | Noted: 2018-07-30

## 2022-03-20 PROBLEM — I10 ESSENTIAL HYPERTENSION: Status: ACTIVE | Noted: 2017-06-08

## 2022-03-20 PROBLEM — Z95.0 PACEMAKER: Status: ACTIVE | Noted: 2021-11-17

## 2022-04-28 NOTE — PROGRESS NOTES
HPI  Nicki Morgan is a 57y.o. year old male patient of Valentina Sandhu MD who presents with c/o   Chief Complaint   Patient presents with    Diabetes    Hypertension       Pt has history of has Type 2 diabetes mellitus with complication, without long-term current use of insulin ([address] Essential hypertension, Irritable bowel syndrome with diarrhea, Coronary artery disease involving native coronary artery of native heart without angina pectoris, Cardiomyopathy ([address] AICD (automatic cardioverter/defibrillator) present, Wears hearing aid, Obstructive sleep apnea treated with BiPAP, and Pacemaker on their problem list..    Pt here for ear cleaning, also wants to discuss DM and HTN. Pt has DM managed with metformin. He has HTN managed with coreg, losartan. Follows with Cards for CAD, cardiomyopathy, has pacemaker with AICD, Dr. Uday Victoria is Cards MD.    Pt was seen for hearing aid check and told wax blockage in both ears. Checks blood sugars every few weeks, lowest has been 114, highest 130. Tolerating metformin ok, gets occ diarrhea that lasts a few days then goes away. Eats oatmeal for breakfast most mornings, or eggs. Doesn't always eat lunch- sometimes PBJ, chicken burrito from [employer]. Eats smaller portions than he used to. Active in yard. Has felt some pulling in chest when overdid it. Denies chest pain, pressure, SOB or WOOD. Denies le edema. Knows he is due for colonoscopy, last one over 10 years.       Lab Results   Component Value Date/Time    Hemoglobin A1c 7.1 (H) 02/04/2020 04:06 PM    Hemoglobin A1c (POC) 6.2 04/29/2022 09:10 AM    Hemoglobin A1c, External 6.9 06/09/2021 12:00 AM                 Lab Results   Component Value Date/Time    Hemoglobin A1c 7.1 (H) 02/04/2020 04:06 PM    Hemoglobin A1c (POC) 6.3 11/17/2021 03:02 PM    Hemoglobin A1c, External 6.9 06/09/2021 12:00 AM     Lab Results   Component Value Date/Time    Sodium 142 02/04/2020 04:06 PM    Potassium 4.2 02/04/2020 04:06 PM    Chloride 101 02/04/2020 04:06 PM    CO2 23 02/04/2020 04:06 PM    Glucose 101 (H) 02/04/2020 04:06 PM    BUN 16 02/04/2020 04:06 PM    Creatinine 0.90 02/04/2020 04:06 PM    BUN/Creatinine ratio 18 02/04/2020 04:06 PM    GFR est  02/04/2020 04:06 PM    GFR est non-AA 93 02/04/2020 04:06 PM    Calcium 9.2 02/04/2020 04:06 PM    Bilirubin, total 0.5 02/04/2020 04:06 PM    Alk.  phosphatase 149 (H) 02/04/2020 04:06 PM    Protein, total 7.1 02/04/2020 04:06 PM    Albumin 4.7 02/04/2020 04:06 PM    A-G Ratio 2.0 02/04/2020 04:06 PM    ALT (SGPT) 29 02/04/2020 04:06 PM    AST (SGOT) 22 02/04/2020 04:06 PM     Lab Results   Component Value Date/Time    Cholesterol, total 109 02/04/2020 04:06 PM    HDL Cholesterol 37 (L) 02/04/2020 04:06 PM    LDL, calculated 52 02/04/2020 04:06 PM    VLDL, calculated 20 02/04/2020 04:06 PM    Triglyceride 98 02/04/2020 04:06 PM             Health Maintenance Overdue  Health Maintenance Due   Topic Date Due    COVID-19 Vaccine (1) Never done    DTaP/Tdap/Td series (1 - Tdap) Never done    Colorectal Cancer Screening Combo  Never done    Lipid Screen  02/04/2021    Pneumococcal 0-64 years (2 - PCV) 12/17/2021       Depression Screen  3 most recent PHQ Screens 11/17/2021   Little interest or pleasure in doing things Not at all   Feeling down, depressed, irritable, or hopeless Not at all   Total Score PHQ 2 0           Patient Active Problem List   Diagnosis Code    Type 2 diabetes mellitus with complication, without long-term current use of insulin ([facility] [address] E11.8    Essential hypertension I10    Irritable bowel syndrome with diarrhea K58.0    Coronary artery disease involving native coronary artery of native heart without angina pectoris I25.10    Cardiomyopathy ([facility] [address] I42.9    AICD (automatic cardioverter/defibrillator) present Z95.810    Wears hearing aid Z97.4    Obstructive sleep apnea treated with BiPAP G47.33    Pacemaker Z95.0     Past Medical History: Diagnosis Date    Diabetes ([facility] [address] [facility]     Hypertension      Past Surgical History:   Procedure Laterality Date    HX GI      hemorroids 10/2014     Social History     Socioeconomic History    Marital status:    Tobacco Use    Smoking status: Never Smoker    Smokeless tobacco: Never Used   Vaping Use    Vaping Use: Never used   Substance and Sexual Activity    Alcohol use: No    Drug use: No    Sexual activity: Yes     Partners: Female   Social History Narrative    , 3 children grown - 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  carvediloL (COREG) 3.125 mg tablet Take 3.125 mg by mouth two (2) times a day.  amiodarone (CORDARONE) 200 mg tablet Take 200 mg by mouth daily.  losartan (COZAAR) 25 mg tablet TAKE 1 TABLET BY MOUTH EVERY DAY    atorvastatin (LIPITOR) 80 mg tablet Take 80 mg by mouth daily.  aspirin 81 mg chewable tablet Take 81 mg by mouth daily. No current facility-administered medications for this visit. REVIEW OF SYSTEMS  Per HPI        Visit Vitals  BP (!) 140/80   Pulse 73   Temp 98 °F (36.7 °C) (Oral)   Resp 12   Ht 5' 9\" (1.753 m)   Wt 208 lb 8 oz (94.6 kg)   SpO2 96%   BMI 30.79 kg/m²         General: Well-developed, well-nourished. In no distress. A&O x 3. Head: Normocephalic, atraumatic. Eyes: Conjunctiva clear. Pupils equal, round, reactive to light. Extraocular movements intact. Ears/Nose: Bilateral cerumen impaction. Following debridement: TM's and ear canals normal bilaterally. Nares normal. Septum midline. Normal nasal mucosa. No drainage or sinus tenderness.    Mouth/Throat: Lips, mucosa, and tongue normal. Oropharynx benign. Neck: Supple, symmetrical, trachea midline, no lymphadenopathy, no carotid bruits, no JVD, thyroid: not enlarged, symmetric, no tenderness/mass/nodules. Lungs: Clear to auscultation bilaterally. No crackles or wheezes. No use of accessory muscles. Speaks in full sentences without SOB. Chest Wall: No tenderness or deformity. Heart: RRR, normal S1 and S2, no murmur, click, rub, or gallop. Skin: No rashes or lesions. Neurovasc: No edema appreciated. Dorsalis pedis pulses are 2+ on the right side, and 2+ on the left side. Posterior tibial pulses are 2+ on the right side, and 2+ on the left side. Musculoskeletal: Gait normal.  Psychiatric: Normal mood and affect. Behavior is normal.       Results for orders placed or performed in visit on 04/29/22   AMB POC HEMOGLOBIN A1C   Result Value Ref Range    Hemoglobin A1c (POC) 6.2 %       ASSESSMENT and PLAN  Diagnoses and all orders for this visit:    1. Type 2 diabetes mellitus with complication, without long-term current use of insulin (HCC)  -     METABOLIC PANEL, COMPREHENSIVE; Future  -     LIPID PANEL; Future  -     AMB POC HEMOGLOBIN A1C  A1C looks great, continue current regimen    2. Essential hypertension  Higher today but normal at Cards visit 1 month ago and WNL at home    3. Bilateral impacted cerumen  -     REMOVAL IMPACTED CERUMEN IRRIGATION/LVG UNILAT    4. Screening for prostate cancer  -     PSA SCREENING (SCREENING); Future    5. Screen for colon cancer  -     REFERRAL TO GASTROENTEROLOGY            Patient Instructions          Learning About Carbohydrate (Carb) Counting and Eating Out When You Have Diabetes  Why plan your meals? Meal planning can be a key part of managing diabetes. Planning meals and snacks with the right balance of carbohydrate, protein, and fat can help you keep your blood sugar at the target level you set with your doctor. You don't have to eat special foods.  You can eat what your family eats, including sweets once in a while. But you do have to pay attention to how often you eat and how much you eat of certain foods. You may want to work with a dietitian or a diabetes educator. They can give you tips and meal ideas and can answer your questions about meal planning. This health professional can also help you reach a healthy weight if that is one of your goals. What should you know about eating carbs? Managing the amount of carbohydrate (carbs) you eat is an important part of healthy meals when you have diabetes. Carbohydrate is found in many foods. · Learn which foods have carbs. And learn the amounts of carbs in different foods. ? Bread, cereal, pasta, and rice have about 15 grams of carbs in a serving. A serving is 1 slice of bread (1 ounce), ½ cup of cooked cereal, or 1/3 cup of cooked pasta or rice. ? Fruits have 15 grams of carbs in a serving. A serving is 1 small fresh fruit, such as an apple or orange; ½ of a banana; ½ cup of cooked or canned fruit; ½ cup of fruit juice; 1 cup of melon or raspberries; or 2 tablespoons of dried fruit. ? Milk and no-sugar-added yogurt have 15 grams of carbs in a serving. A serving is 1 cup of milk or 3/4 cup (6 oz) of no-sugar-added yogurt. ? Starchy vegetables have 15 grams of carbs in a serving. A serving is ½ cup of mashed potatoes or sweet potato; 1 cup winter squash; ½ of a small baked potato; ½ cup of cooked beans; or ½ cup cooked corn or green peas. · Learn how much carbs to eat each day and at each meal. A dietitian or certified diabetes educator can teach you how to keep track of the amount of carbs you eat. This is called carbohydrate counting. · If you are not sure how to count carbohydrate grams, use the plate method to plan meals. It is a quick way to make sure that you have a balanced meal. It also can help you manage the amount of carbohydrate you eat at meals. ? Divide your plate by types of foods.  Put non-starchy vegetables on half the plate, meat or other protein food on one-quarter of the plate, and a grain or starchy vegetable in the final quarter of the plate. To this you can add a small piece of fruit and 1 cup of milk or yogurt, depending on how many carbs you are supposed to eat at a meal.  · Try to eat about the same amount of carbs at each meal. Do not \"save up\" your daily allowance of carbs to eat at one meal.  · Proteins have very little or no carbs. Examples of proteins are beef, chicken, turkey, fish, eggs, tofu, cheese, cottage cheese, and peanut butter. How can you eat out and still eat healthy? · Learn to estimate the serving sizes of foods that have carbohydrate. If you measure food at home, it will be easier to estimate the amount in a serving of restaurant food. · If the meal you order has too much carbohydrate (such as potatoes, corn, or baked beans), ask to have a low-carbohydrate food instead. Ask for a salad or non-starchy vegetables like broccoli, cauliflower, green beans, or peppers. · If you eat more carbohydrate at a meal than you had planned, take a walk or do other exercise. This will help lower your blood sugar. What are some tips for eating healthy? · Limit saturated fat, such as the fat from meat and dairy products. This is a healthy choice because people who have diabetes are at higher risk of heart disease. So choose lean cuts of meat and nonfat or low-fat dairy products. Use olive or canola oil instead of butter or shortening when cooking. · Don't skip meals. Your blood sugar may drop too low if you skip meals and take insulin or certain medicines for diabetes. · Check with your doctor before you drink alcohol. Alcohol can cause your blood sugar to drop too low. Alcohol can also cause a bad reaction if you take certain diabetes medicines. Follow-up care is a key part of your treatment and safety.  Be sure to make and go to all appointments, and call your doctor if you are having problems. It's also a good idea to know your test results and keep a list of the medicines you take. Where can you learn more? Go to http://www.[employer].com/  Enter I147 in the search box to learn more about \"Learning About Carbohydrate (Carb) Counting and Eating Out When You Have Diabetes. \"  Current as of: September 8, 2021               Content Version: 13.2  © 2006-2022 [employer]. Care instructions adapted under license by [employer] (which disclaims liability or warranty for this information). If you have questions about a medical condition or this instruction, always ask your healthcare professional. [place] [address] any warranty or liability for your use of this information. Earwax Blockage: Care Instructions  Your Care Instructions     Earwax is a natural substance that protects the ear canal. Normally, earwax drains from the ears and does not cause problems. Sometimes earwax builds up and hardens. Earwax blockage (also called cerumen impaction) can cause some loss of hearing and pain. When wax is tightly packed, you will need to have your doctor remove it. Follow-up care is a key part of your treatment and safety. Be sure to make and go to all appointments, and call your doctor if you are having problems. It's also a good idea to know your test results and keep a list of the medicines you take. How can you care for yourself at home? · Do not try to remove earwax with cotton swabs, fingers, or other objects. This can make the blockage worse and damage the eardrum. · If your doctor recommends that you try to remove earwax at home:  ? Soften and loosen the earwax with warm mineral oil. You also can try hydrogen peroxide mixed with an equal amount of room temperature water. Place 2 drops of the fluid, warmed to body temperature, in the ear two times a day for up to 5 days.   ? Once the wax is loose and soft, all that is usually needed to remove it from the ear canal is a gentle, warm shower. Direct the water into the ear, then tip your head to let the earwax drain out. Dry your ear thoroughly with a hair dryer set on low. Hold the dryer several inches from your ear. ? If the warm mineral oil and shower do not work, use an over-the-counter wax softener. Read and follow all instructions on the label. After using the wax softener, use an ear syringe to gently flush the ear. Make sure the flushing solution is body temperature. Cool or hot fluids in the ear can cause dizziness. When should you call for help? Call your doctor now or seek immediate medical care if:    · Pus or blood drains from your ear.     · Your ears are ringing or feel full.     · You have a loss of hearing. Watch closely for changes in your health, and be sure to contact your doctor if:    · You have pain or reduced hearing after 1 week of home treatment.     · You have any new symptoms, such as nausea or balance problems. Where can you learn more? Go to http://www.gray.com/  Enter Q495 in the search box to learn more about \"Earwax Blockage: Care Instructions. \"  Current as of: July 1, 2021               Content Version: 13.2  © 4184-8170 [employer]. Care instructions adapted under license by [employer] (which disclaims liability or warranty for this information). If you have questions about a medical condition or this instruction, always ask your healthcare professional. [place] [address] any warranty or liability for your use of this information.           Please keep your follow-up appointment with Nadiya Haney MD.         Health Maintenance Due   Topic Date Due    COVID-19 Vaccine (1) Never done    DTaP/Tdap/Td series (1 - Tdap) Never done    Colorectal Cancer Screening Combo  Never done    Lipid Screen  02/04/2021    Pneumococcal 0-64 years (2 - PCV) 12/17/2021       I have discussed the diagnosis with the patient and the intended plan as seen in the above orders. Patient is in agreement. The patient has received an after-visit summary and questions were answered concerning future plans. I have discussed medication side effects and warnings with the patient as well. Warning signs for the above conditions were discussed including when to call our office or go to the emergency room. The nurse provided the patient and/or family with advanced directive information if needed and encouraged the patient to provide a copy to the office when available.

## 2022-04-29 ENCOUNTER — OFFICE VISIT (OUTPATIENT)
Dept: INTERNAL MEDICINE CLINIC | Age: 62
End: 2022-04-29
Payer: COMMERCIAL

## 2022-04-29 VITALS
TEMPERATURE: 98 F | HEART RATE: 73 BPM | SYSTOLIC BLOOD PRESSURE: 140 MMHG | OXYGEN SATURATION: 96 % | HEIGHT: 69 IN | DIASTOLIC BLOOD PRESSURE: 80 MMHG | WEIGHT: 208.5 LBS | RESPIRATION RATE: 12 BRPM | BODY MASS INDEX: 30.88 KG/M2

## 2022-04-29 DIAGNOSIS — H61.23 BILATERAL IMPACTED CERUMEN: ICD-10-CM

## 2022-04-29 DIAGNOSIS — Z12.5 SCREENING FOR PROSTATE CANCER: ICD-10-CM

## 2022-04-29 DIAGNOSIS — I10 ESSENTIAL HYPERTENSION: ICD-10-CM

## 2022-04-29 DIAGNOSIS — Z12.11 SCREEN FOR COLON CANCER: ICD-10-CM

## 2022-04-29 DIAGNOSIS — E11.8 TYPE 2 DIABETES MELLITUS WITH COMPLICATION, WITHOUT LONG-TERM CURRENT USE OF INSULIN (HCC): Primary | ICD-10-CM

## 2022-04-29 LAB — HBA1C MFR BLD HPLC: 6.2 %

## 2022-04-29 PROCEDURE — 3044F HG A1C LEVEL LT 7.0%: CPT | Performed by: NURSE PRACTITIONER

## 2022-04-29 PROCEDURE — 69209 REMOVE IMPACTED EAR WAX UNI: CPT | Performed by: NURSE PRACTITIONER

## 2022-04-29 PROCEDURE — 99214 OFFICE O/P EST MOD 30 MIN: CPT | Performed by: NURSE PRACTITIONER

## 2022-04-29 PROCEDURE — 83036 HEMOGLOBIN GLYCOSYLATED A1C: CPT | Performed by: NURSE PRACTITIONER

## 2022-04-29 RX ORDER — GLUCOSAMINE SULFATE 1500 MG
1000 POWDER IN PACKET (EA) ORAL DAILY
COMMUNITY

## 2022-04-29 NOTE — PROGRESS NOTES
Troy Castañeda  Identified pt with two pt identifiers(name and ). Chief Complaint   Patient presents with    Diabetes    Hypertension       Reviewed record In preparation for visit and have obtained necessary documentation. 1. Have you been to the ER, urgent care clinic or hospitalized since your last visit? No     2. Have you seen or consulted any other health care providers outside of the [address] [place] [address] since your last visit? Include any pap smears or colon screening. No    Vitals reviewed with provider.     Health Maintenance reviewed:     Health Maintenance Due   Topic    COVID-19 Vaccine (1)    Colorectal Cancer Screening Combo     DTaP/Tdap/Td series (1 - Tdap)    Lipid Screen     Pneumococcal 0-64 years (2 - PCV)          Wt Readings from Last 3 Encounters:   22 208 lb 8 oz (94.6 kg)   21 209 lb 8 oz (95 kg)   20 212 lb 6.4 oz (96.3 kg)        Temp Readings from Last 3 Encounters:   22 98 °F (36.7 °C) (Oral)   21 98.3 °F (36.8 °C) (Oral)   20 98.2 °F (36.8 °C) (Oral)        BP Readings from Last 3 Encounters:   22 (!) 149/85   21 126/82   20 112/72        Pulse Readings from Last 3 Encounters:   22 73   21 80   20 85        Vitals:    22 0909   BP: (!) 149/85   Pulse: 73   Resp: 12   Temp: 98 °F (36.7 °C)   TempSrc: Oral   SpO2: 96%   Weight: 208 lb 8 oz (94.6 kg)   Height: 5' 9\" (1.753 m)   PainSc:   0 - No pain          Learning Assessment:   :       Learning Assessment 2020   PRIMARY LEARNER Patient   PRIMARY LANGUAGE ENGLISH   LEARNER PREFERENCE PRIMARY DEMONSTRATION   ANSWERED BY Patient   RELATIONSHIP SELF        Depression Screening:   :       3 most recent PHQ Screens 2021   Little interest or pleasure in doing things Not at all   Feeling down, depressed, irritable, or hopeless Not at all   Total Score PHQ 2 0        Fall Risk Assessment:   :       Fall Risk Assessment, last 12 mths 8/7/2019   Able to walk? Yes   Fall in past 12 months? No        Abuse Screening:   :       Abuse Screening Questionnaire 11/17/2021 8/21/2020 8/7/2019 7/30/2018   Do you ever feel afraid of your partner? N N N N   Are you in a relationship with someone who physically or mentally threatens you? N N N N   Is it safe for you to go home?  Y Y Y Y        ADL Screening:   :       ADL Assessment 8/7/2019   Feeding yourself No Help Needed   Getting from bed to chair No Help Needed   Getting dressed No Help Needed   Bathing or showering No Help Needed   Walk across the room (includes cane/walker) No Help Needed   Using the telphone No Help Needed   Taking your medications No Help Needed   Preparing meals No Help Needed   Managing money (expenses/bills) No Help Needed   Moderately strenuous housework (laundry) No Help Needed   Shopping for personal items (toiletries/medicines) No Help Needed   Shopping for groceries No Help Needed   Driving No Help Needed   Climbing a flight of stairs No Help Needed   Getting to places beyond walking distances No Help Needed

## 2022-04-29 NOTE — PATIENT INSTRUCTIONS
Learning About Carbohydrate (Carb) Counting and Eating Out When You Have Diabetes  Why plan your meals? Meal planning can be a key part of managing diabetes. Planning meals and snacks with the right balance of carbohydrate, protein, and fat can help you keep your blood sugar at the target level you set with your doctor. You don't have to eat special foods. You can eat what your family eats, including sweets once in a while. But you do have to pay attention to how often you eat and how much you eat of certain foods. You may want to work with a dietitian or a diabetes educator. They can give you tips and meal ideas and can answer your questions about meal planning. This health professional can also help you reach a healthy weight if that is one of your goals. What should you know about eating carbs? Managing the amount of carbohydrate (carbs) you eat is an important part of healthy meals when you have diabetes. Carbohydrate is found in many foods. · Learn which foods have carbs. And learn the amounts of carbs in different foods. ? Bread, cereal, pasta, and rice have about 15 grams of carbs in a serving. A serving is 1 slice of bread (1 ounce), ½ cup of cooked cereal, or 1/3 cup of cooked pasta or rice. ? Fruits have 15 grams of carbs in a serving. A serving is 1 small fresh fruit, such as an apple or orange; ½ of a banana; ½ cup of cooked or canned fruit; ½ cup of fruit juice; 1 cup of melon or raspberries; or 2 tablespoons of dried fruit. ? Milk and no-sugar-added yogurt have 15 grams of carbs in a serving. A serving is 1 cup of milk or 3/4 cup (6 oz) of no-sugar-added yogurt. ? Starchy vegetables have 15 grams of carbs in a serving. A serving is ½ cup of mashed potatoes or sweet potato; 1 cup winter squash; ½ of a small baked potato; ½ cup of cooked beans; or ½ cup cooked corn or green peas.   · Learn how much carbs to eat each day and at each meal. A dietitian or certified diabetes educator can teach you how to keep track of the amount of carbs you eat. This is called carbohydrate counting. · If you are not sure how to count carbohydrate grams, use the plate method to plan meals. It is a quick way to make sure that you have a balanced meal. It also can help you manage the amount of carbohydrate you eat at meals. ? Divide your plate by types of foods. Put non-starchy vegetables on half the plate, meat or other protein food on one-quarter of the plate, and a grain or starchy vegetable in the final quarter of the plate. To this you can add a small piece of fruit and 1 cup of milk or yogurt, depending on how many carbs you are supposed to eat at a meal.  · Try to eat about the same amount of carbs at each meal. Do not \"save up\" your daily allowance of carbs to eat at one meal.  · Proteins have very little or no carbs. Examples of proteins are beef, chicken, turkey, fish, eggs, tofu, cheese, cottage cheese, and peanut butter. How can you eat out and still eat healthy? · Learn to estimate the serving sizes of foods that have carbohydrate. If you measure food at home, it will be easier to estimate the amount in a serving of restaurant food. · If the meal you order has too much carbohydrate (such as potatoes, corn, or baked beans), ask to have a low-carbohydrate food instead. Ask for a salad or non-starchy vegetables like broccoli, cauliflower, green beans, or peppers. · If you eat more carbohydrate at a meal than you had planned, take a walk or do other exercise. This will help lower your blood sugar. What are some tips for eating healthy? · Limit saturated fat, such as the fat from meat and dairy products. This is a healthy choice because people who have diabetes are at higher risk of heart disease. So choose lean cuts of meat and nonfat or low-fat dairy products. Use olive or canola oil instead of butter or shortening when cooking. · Don't skip meals.  Your blood sugar may drop too low if you skip meals and take insulin or certain medicines for diabetes. · Check with your doctor before you drink alcohol. Alcohol can cause your blood sugar to drop too low. Alcohol can also cause a bad reaction if you take certain diabetes medicines. Follow-up care is a key part of your treatment and safety. Be sure to make and go to all appointments, and call your doctor if you are having problems. It's also a good idea to know your test results and keep a list of the medicines you take. Where can you learn more? Go to http://www.weinberg.com/  Enter I147 in the search box to learn more about \"Learning About Carbohydrate (Carb) Counting and Eating Out When You Have Diabetes. \"  Current as of: September 8, 2021               Content Version: 13.2  © 2006-2022 [employer]. Care instructions adapted under license by [employer] (which disclaims liability or warranty for this information). If you have questions about a medical condition or this instruction, always ask your healthcare professional. [place] [address] any warranty or liability for your use of this information. Earwax Blockage: Care Instructions  Your Care Instructions     Earwax is a natural substance that protects the ear canal. Normally, earwax drains from the ears and does not cause problems. Sometimes earwax builds up and hardens. Earwax blockage (also called cerumen impaction) can cause some loss of hearing and pain. When wax is tightly packed, you will need to have your doctor remove it. Follow-up care is a key part of your treatment and safety. Be sure to make and go to all appointments, and call your doctor if you are having problems. It's also a good idea to know your test results and keep a list of the medicines you take. How can you care for yourself at home? · Do not try to remove earwax with cotton swabs, fingers, or other objects.  This can make the blockage worse and damage the eardrum. · If your doctor recommends that you try to remove earwax at home:  ? Soften and loosen the earwax with warm mineral oil. You also can try hydrogen peroxide mixed with an equal amount of room temperature water. Place 2 drops of the fluid, warmed to body temperature, in the ear two times a day for up to 5 days. ? Once the wax is loose and soft, all that is usually needed to remove it from the ear canal is a gentle, warm shower. Direct the water into the ear, then tip your head to let the earwax drain out. Dry your ear thoroughly with a hair dryer set on low. Hold the dryer several inches from your ear. ? If the warm mineral oil and shower do not work, use an over-the-counter wax softener. Read and follow all instructions on the label. After using the wax softener, use an ear syringe to gently flush the ear. Make sure the flushing solution is body temperature. Cool or hot fluids in the ear can cause dizziness. When should you call for help? Call your doctor now or seek immediate medical care if:    · Pus or blood drains from your ear.     · Your ears are ringing or feel full.     · You have a loss of hearing. Watch closely for changes in your health, and be sure to contact your doctor if:    · You have pain or reduced hearing after 1 week of home treatment.     · You have any new symptoms, such as nausea or balance problems. Where can you learn more? Go to http://www.gray.com/  Enter Q495 in the search box to learn more about \"Earwax Blockage: Care Instructions. \"  Current as of: July 1, 2021               Content Version: 13.2  © 2490-5374 [employer]. Care instructions adapted under license by [employer] (which disclaims liability or warranty for this information).  If you have questions about a medical condition or this instruction, always ask your healthcare professional. [address]  any warranty or liability for your use of this information.

## 2022-05-05 LAB
CREATININE, EXTERNAL: 1.02
LDL-C, EXTERNAL: 49
PSA, EXTERNAL: 0.59

## 2022-05-24 ENCOUNTER — TELEPHONE (OUTPATIENT)
Dept: INTERNAL MEDICINE CLINIC | Age: 62
End: 2022-05-24

## 2022-05-24 DIAGNOSIS — E11.8 TYPE 2 DIABETES MELLITUS WITH COMPLICATION, WITHOUT LONG-TERM CURRENT USE OF INSULIN (HCC): ICD-10-CM

## 2022-05-24 DIAGNOSIS — Z12.5 SCREENING FOR PROSTATE CANCER: ICD-10-CM

## 2022-05-24 NOTE — TELEPHONE ENCOUNTER
----- Message from Nicola Gastelum sent at 5/24/2022 10:50 AM EDT -----  Subject: Message to Provider    QUESTIONS  Information for Provider? pt requesting a call back in regards to lab work   he completed. pt also has medication questions he'd like to speak about.  ---------------------------------------------------------------------------  --------------  CALL BACK INFO  What is the best way for the office to contact you? OK to leave message on   voicemail  Preferred Call Back Phone Number? [phone]  ---------------------------------------------------------------------------  --------------  SCRIPT ANSWERS  Relationship to Patient?  Self

## 2022-05-25 RX ORDER — METFORMIN HYDROCHLORIDE 500 MG/1
500 TABLET, EXTENDED RELEASE ORAL 2 TIMES DAILY WITH MEALS
Qty: 60 TABLET | Refills: 5 | Status: SHIPPED | OUTPATIENT
Start: 2022-05-25 | End: 2022-09-18

## 2022-05-25 NOTE — TELEPHONE ENCOUNTER
I called and verified the patient by name and date of birth, then proceeded with the message from the provider. The patient understood and wanted to know if he can switch to extended release Metformin. Regular is causing stomach issues.

## 2022-05-25 NOTE — TELEPHONE ENCOUNTER
I called the patient and verified them by name and date of birth. I informed him on the medication change. She stated understanding and had no further questions.

## 2022-09-18 RX ORDER — METFORMIN HYDROCHLORIDE 500 MG/1
TABLET, EXTENDED RELEASE ORAL
Qty: 180 TABLET | Refills: 1 | Status: SHIPPED | OUTPATIENT
Start: 2022-09-18

## 2022-10-28 ENCOUNTER — OFFICE VISIT (OUTPATIENT)
Dept: INTERNAL MEDICINE CLINIC | Age: 62
End: 2022-10-28
Payer: COMMERCIAL

## 2022-10-28 VITALS
OXYGEN SATURATION: 95 % | BODY MASS INDEX: 30.66 KG/M2 | TEMPERATURE: 97.9 F | HEART RATE: 84 BPM | DIASTOLIC BLOOD PRESSURE: 82 MMHG | WEIGHT: 207 LBS | SYSTOLIC BLOOD PRESSURE: 124 MMHG | RESPIRATION RATE: 12 BRPM | HEIGHT: 69 IN

## 2022-10-28 DIAGNOSIS — I42.9 CARDIOMYOPATHY, UNSPECIFIED TYPE (HCC): ICD-10-CM

## 2022-10-28 DIAGNOSIS — I25.10 CORONARY ARTERY DISEASE INVOLVING NATIVE CORONARY ARTERY OF NATIVE HEART WITHOUT ANGINA PECTORIS: ICD-10-CM

## 2022-10-28 DIAGNOSIS — I10 ESSENTIAL HYPERTENSION: ICD-10-CM

## 2022-10-28 DIAGNOSIS — E11.8 TYPE 2 DIABETES MELLITUS WITH COMPLICATION, WITHOUT LONG-TERM CURRENT USE OF INSULIN (HCC): Primary | ICD-10-CM

## 2022-10-28 LAB — HBA1C MFR BLD HPLC: 6.1 %

## 2022-10-28 PROCEDURE — 3074F SYST BP LT 130 MM HG: CPT | Performed by: INTERNAL MEDICINE

## 2022-10-28 PROCEDURE — 99214 OFFICE O/P EST MOD 30 MIN: CPT | Performed by: INTERNAL MEDICINE

## 2022-10-28 PROCEDURE — 3078F DIAST BP <80 MM HG: CPT | Performed by: INTERNAL MEDICINE

## 2022-10-28 PROCEDURE — 83036 HEMOGLOBIN GLYCOSYLATED A1C: CPT | Performed by: INTERNAL MEDICINE

## 2022-10-28 PROCEDURE — 3044F HG A1C LEVEL LT 7.0%: CPT | Performed by: INTERNAL MEDICINE

## 2022-10-28 RX ORDER — PRASUGREL 10 MG/1
TABLET, FILM COATED ORAL
COMMUNITY
Start: 2022-10-27

## 2022-10-28 NOTE — PROGRESS NOTES
Dc White  Identified pt with two pt identifiers(name and ). Chief Complaint   Patient presents with    Diabetes    Hypertension       Reviewed record In preparation for visit and have obtained necessary documentation. 1. Have you been to the ER, urgent care clinic or hospitalized since your last visit? No     2. Have you seen or consulted any other health care providers outside of the [address] [place] [address] since your last visit? Include any pap smears or colon screening. No    Vitals reviewed with provider. Health Maintenance reviewed:     Health Maintenance Due   Topic    COVID-19 Vaccine (1)    Colorectal Cancer Screening Combo     DTaP/Tdap/Td series (1 - Tdap)    Flu Vaccine (1)    Foot Exam Q1     MICROALBUMIN Q1           Wt Readings from Last 3 Encounters:   22 208 lb 8 oz (94.6 kg)   21 209 lb 8 oz (95 kg)   20 212 lb 6.4 oz (96.3 kg)        Temp Readings from Last 3 Encounters:   22 98 °F (36.7 °C) (Oral)   21 98.3 °F (36.8 °C) (Oral)   20 98.2 °F (36.8 °C) (Oral)        BP Readings from Last 3 Encounters:   22 (!) 140/80   21 126/82   20 112/72        Pulse Readings from Last 3 Encounters:   22 73   21 80   20 85      There were no vitals filed for this visit. Learning Assessment:   :       Learning Assessment 2020   PRIMARY LEARNER Patient   PRIMARY LANGUAGE ENGLISH   LEARNER PREFERENCE PRIMARY DEMONSTRATION   ANSWERED BY Patient   RELATIONSHIP SELF        Depression Screening:   :       3 most recent PHQ Screens 2021   Little interest or pleasure in doing things Not at all   Feeling down, depressed, irritable, or hopeless Not at all   Total Score PHQ 2 0        Fall Risk Assessment:   :       Fall Risk Assessment, last 12 mths 2019   Able to walk? Yes   Fall in past 12 months?  No        Abuse Screening:   :       Abuse Screening Questionnaire 2021   Do you ever feel afraid of your partner? N N N N   Are you in a relationship with someone who physically or mentally threatens you? N N N N   Is it safe for you to go home?  Y Y Y Y        ADL Screening:   :       ADL Assessment 8/7/2019   Feeding yourself No Help Needed   Getting from bed to chair No Help Needed   Getting dressed No Help Needed   Bathing or showering No Help Needed   Walk across the room (includes cane/walker) No Help Needed   Using the telphone No Help Needed   Taking your medications No Help Needed   Preparing meals No Help Needed   Managing money (expenses/bills) No Help Needed   Moderately strenuous housework (laundry) No Help Needed   Shopping for personal items (toiletries/medicines) No Help Needed   Shopping for groceries No Help Needed   Driving No Help Needed   Climbing a flight of stairs No Help Needed   Getting to places beyond walking distances No Help Needed

## 2022-10-28 NOTE — PROGRESS NOTES
HPI  Mr. Brandt Saenz is a 60y.o. year old male, he is seen today for follow up DM, HTN. Says he had another MI since last visit - Dr. Wilbert Cummings = cardiologist - [address] - September. Feeling better - had 2 stents placed. Was on brilinta, couldn't tolerate - will start Effient today. No chest pain, sob, dizziness - now. Still hasn't done colonoscopy - will check with cardiology when he is able to schedule. Had spells of abdominal bloating, nausea on brilinta - none since off it. Now no n/v/abd pain. A1C is 6.1 today - was 6.2 6 mos ago. Has been on amiodarone for about 1.5 years. Chief Complaint   Patient presents with    Diabetes    Hypertension        Prior to Admission medications    Medication Sig Start Date End Date Taking? Authorizing Provider   metFORMIN ER (GLUCOPHAGE XR) 500 mg tablet TAKE 1 TABLET BY MOUTH TWICE A DAY WITH MEALS 9/18/22  Yes Verónica Peñaloza MD   cholecalciferol (VITAMIN D3) 25 mcg (1,000 unit) cap Take 1,000 Units by mouth daily. Yes Provider, Historical   carvediloL (COREG) 3.125 mg tablet Take 3.125 mg by mouth two (2) times a day. 9/17/21  Yes Provider, Historical   amiodarone (CORDARONE) 200 mg tablet Take 200 mg by mouth daily. 5/7/21  Yes Provider, Historical   atorvastatin (LIPITOR) 80 mg tablet Take 80 mg by mouth daily. 3/4/19  Yes Provider, Historical   aspirin 81 mg chewable tablet Take 81 mg by mouth daily. Yes Provider, Historical   prasugreL (EFFIENT) 10 mg tablet  10/27/22   Provider, Historical   clopidogreL (PLAVIX) 75 mg tab 75 mg daily.  8/23/21 10/28/22  Provider, Historical   losartan (COZAAR) 25 mg tablet TAKE 1 TABLET BY MOUTH EVERY DAY  Patient not taking: Reported on 10/28/2022 9/28/21   Veróniac Peñaloza MD         No Known Allergies      REVIEW OF SYSTEMS:  Per HPI    PHYSICAL EXAM:  Visit Vitals  /82 (BP 1 Location: Left upper arm, BP Patient Position: Sitting)   Pulse 84   Temp 97.9 °F (36.6 °C) (Oral)   Resp 12   Ht 5' 9\" (1.753 m) Wt 207 lb (93.9 kg)   SpO2 95%   BMI 30.57 kg/m²     Constitutional: Appears well-developed and well-nourished. No distress. HENT:   Head: Normocephalic and atraumatic. Eyes: No scleral icterus. Neck: no lad, no tm, supple   Cardiovascular: Normal S1/S2, regular rhythm. No murmurs, rubs, or gallops. Pulmonary/Chest: Effort normal and breath sounds normal. No respiratory distress. No wheezes, rhonchi, or rales. Ext: No edema. Neurological: Alert. Psychiatric: Normal mood and affect. Behavior is normal.     Lab Results   Component Value Date/Time    Sodium 142 02/04/2020 04:06 PM    Potassium 4.2 02/04/2020 04:06 PM    Chloride 101 02/04/2020 04:06 PM    CO2 23 02/04/2020 04:06 PM    Glucose 101 (H) 02/04/2020 04:06 PM    BUN 16 02/04/2020 04:06 PM    Creatinine 0.90 02/04/2020 04:06 PM    BUN/Creatinine ratio 18 02/04/2020 04:06 PM    GFR est  02/04/2020 04:06 PM    GFR est non-AA 93 02/04/2020 04:06 PM    Calcium 9.2 02/04/2020 04:06 PM    Bilirubin, total 0.5 02/04/2020 04:06 PM    Alk. phosphatase 149 (H) 02/04/2020 04:06 PM    Protein, total 7.1 02/04/2020 04:06 PM    Albumin 4.7 02/04/2020 04:06 PM    A-G Ratio 2.0 02/04/2020 04:06 PM    ALT (SGPT) 29 02/04/2020 04:06 PM     Lab Results   Component Value Date/Time    Hemoglobin A1c 7.1 (H) 02/04/2020 04:06 PM    Hemoglobin A1c 7.1 (H) 11/06/2018 09:47 AM    Hemoglobin A1c 7.0 (H) 05/03/2018 10:03 AM    Hemoglobin A1c, External 6.9 06/09/2021 12:00 AM    Hemoglobin A1c, External 6.6 11/20/2020 12:00 AM      Lab Results   Component Value Date/Time    Cholesterol, total 109 02/04/2020 04:06 PM    HDL Cholesterol 37 (L) 02/04/2020 04:06 PM    LDL, calculated 52 02/04/2020 04:06 PM    VLDL, calculated 20 02/04/2020 04:06 PM    Triglyceride 98 02/04/2020 04:06 PM          ASSESSMENT/PLAN  Diagnoses and all orders for this visit:    1.  Type 2 diabetes mellitus with complication, without long-term current use of insulin (HCC)  -     AMB POC HEMOGLOBIN A1C  -     MICROALBUMIN, UR, RAND W/ MICROALB/CREAT RATIO; Future    2. Coronary artery disease involving native coronary artery of native heart without angina pectoris    3. Essential hypertension    4. Cardiomyopathy, unspecified type ([facility] [address]    Excellent DM control - continue metformin  Get notes from [facility]/cardiology - BP at goal - #2 and #3 managed by Dr. Cuco Mendoza and stable    Health Maintenance Due   Topic Date Due    Colorectal Cancer Screening Combo  Never done    DTaP/Tdap/Td series (1 - Tdap) 08/29/2005    Foot Exam Q1  11/17/2022    MICROALBUMIN Q1  11/17/2022        Follow-up and Dispositions    Return in about 6 months (around 4/28/2023) for bp, dm, well exam.            Reviewed plan of care. Patient has provided input and agrees with goals. The nurse provided the patient and/or family with advanced directive information if needed and encouraged the patient to provide a copy to the office when available.

## 2022-10-31 LAB
ALBUMIN/CREAT UR: 3 MG/G CREAT (ref 0–29)
CREAT UR-MCNC: 493.3 MG/DL
MICROALBUMIN UR-MCNC: 16.9 UG/ML

## 2022-12-02 ENCOUNTER — PATIENT MESSAGE (OUTPATIENT)
Dept: INTERNAL MEDICINE CLINIC | Age: 62
End: 2022-12-02

## 2022-12-05 NOTE — TELEPHONE ENCOUNTER
From: Namrata Trivedi  To: Luis E Ruiz MD  Sent: 12/2/2022 11:12 AM EST  Subject: new doctor    Please give me your recommendation for another heart doctor. I had another heart attack in September and had two stents put in and after I never felt better had to go back and make Dr. Carmen Arguello take another look in November and he put another stent in another place. My blood pressure is still up all the time and he is does not seems concerned. I would like to get another opinion. What specialty am I looking for in a new doctor and can you recommend someone?    thanks

## 2022-12-14 LAB — LDL-C, EXTERNAL: 94

## 2023-05-01 ENCOUNTER — OFFICE VISIT (OUTPATIENT)
Dept: INTERNAL MEDICINE CLINIC | Age: 63
End: 2023-05-01
Payer: COMMERCIAL

## 2023-05-01 VITALS
DIASTOLIC BLOOD PRESSURE: 84 MMHG | BODY MASS INDEX: 31.4 KG/M2 | TEMPERATURE: 98 F | RESPIRATION RATE: 12 BRPM | WEIGHT: 212 LBS | HEART RATE: 80 BPM | SYSTOLIC BLOOD PRESSURE: 124 MMHG | HEIGHT: 69 IN | OXYGEN SATURATION: 96 %

## 2023-05-01 DIAGNOSIS — E11.8 TYPE 2 DIABETES MELLITUS WITH COMPLICATION, WITHOUT LONG-TERM CURRENT USE OF INSULIN (HCC): Primary | ICD-10-CM

## 2023-05-01 DIAGNOSIS — I10 ESSENTIAL HYPERTENSION: ICD-10-CM

## 2023-05-01 LAB — HBA1C MFR BLD HPLC: 6.5 %

## 2023-05-01 PROCEDURE — 99213 OFFICE O/P EST LOW 20 MIN: CPT | Performed by: INTERNAL MEDICINE

## 2023-05-01 PROCEDURE — 83036 HEMOGLOBIN GLYCOSYLATED A1C: CPT | Performed by: INTERNAL MEDICINE

## 2023-05-01 PROCEDURE — 3074F SYST BP LT 130 MM HG: CPT | Performed by: INTERNAL MEDICINE

## 2023-05-01 PROCEDURE — 3044F HG A1C LEVEL LT 7.0%: CPT | Performed by: INTERNAL MEDICINE

## 2023-05-01 PROCEDURE — 3079F DIAST BP 80-89 MM HG: CPT | Performed by: INTERNAL MEDICINE

## 2023-05-01 NOTE — PROGRESS NOTES
HPI  Mr. Sharmaine Joiner is a 60y.o. year old male, he is seen today for DM, HTN. New cardiologist made changes to his medications as noted in the med list.   Ordered labs - had them drawn last Friday. Results not back yet. No chest pain, sob, dizziness, weakness, lightheadedness. No edema. BP has been better controlled - more recently 120s/70s. Had been as high as 324R systolic. Glucose has been as high as 130 - normally low 100s. Will ask cardiologist about when would be appropriate for colonoscopy. Last cardiac stent 11/2022. A1C is 6.5      Chief Complaint   Patient presents with    Diabetes    Hypertension        Prior to Admission medications    Medication Sig Start Date End Date Taking? Authorizing Provider   VITAMIN K2 PO Take  by mouth daily. Yes Provider, Historical   OTHER Prostate health once daily   Yes Provider, Historical   sacubitriL-valsartan (Entresto) 49-51 mg tab tablet Take 1 Tablet by mouth two (2) times a day. 4/19/23  Yes Sunitha Smith MD   amiodarone (PACERONE) 100 mg tablet Take 1 Tablet by mouth daily. 4/12/23  Yes Sunitha Smith MD   spironolactone (ALDACTONE) 25 mg tablet Take 1 Tablet by mouth daily. 4/12/23  Yes Sunitha Smith MD   rosuvastatin (CRESTOR) 20 mg tablet Take 1 Tablet by mouth nightly. 4/12/23  Yes Sunitha Smith MD   prasugreL (EFFIENT) 10 mg tablet Take 1 Tablet by mouth daily. 10/27/22  Yes Provider, Historical   metFORMIN ER (GLUCOPHAGE XR) 500 mg tablet TAKE 1 TABLET BY MOUTH TWICE A DAY WITH MEALS 9/18/22  Yes Marylee Comings, MD   cholecalciferol (VITAMIN D3) 25 mcg (1,000 unit) cap Take 1 Capsule by mouth daily. Yes Provider, Historical   carvediloL (COREG) 3.125 mg tablet Take 1 Tablet by mouth two (2) times a day. 9/17/21  Yes Provider, Historical   aspirin 81 mg chewable tablet Take 1 Tablet by mouth daily.    Yes Provider, Historical         No Known Allergies      REVIEW OF SYSTEMS:  Per HPI    PHYSICAL EXAM:  Visit Vitals  /84 (BP 1 Location: Left upper arm, BP Patient Position: Sitting)   Pulse 80   Temp 98 °F (36.7 °C) (Oral)   Resp 12   Ht 5' 9\" (1.753 m)   Wt 212 lb (96.2 kg)   SpO2 96%   BMI 31.31 kg/m²     Constitutional: Appears well-developed and well-nourished. No distress. HENT:   Head: Normocephalic and atraumatic. Eyes: No scleral icterus. Ears: tm's wnl   Neck: no lad, no tm, supple   Cardiovascular: Normal S1/S2, regular rhythm. No murmurs, rubs, or gallops. Pulmonary/Chest: Effort normal and breath sounds normal. No respiratory distress. No wheezes, rhonchi, or rales. Ext: No edema. Neurological: Alert. Psychiatric: Normal mood and affect. Behavior is normal.     DM foot exam: 2+ pedal pulses, no lesions, sensation intact to monofilament b/l     Lab Results   Component Value Date/Time    Sodium 142 02/04/2020 04:06 PM    Potassium 4.2 02/04/2020 04:06 PM    Chloride 101 02/04/2020 04:06 PM    CO2 23 02/04/2020 04:06 PM    Glucose 101 (H) 02/04/2020 04:06 PM    BUN 16 02/04/2020 04:06 PM    Creatinine 0.90 02/04/2020 04:06 PM    BUN/Creatinine ratio 18 02/04/2020 04:06 PM    GFR est  02/04/2020 04:06 PM    GFR est non-AA 93 02/04/2020 04:06 PM    Calcium 9.2 02/04/2020 04:06 PM    Bilirubin, total 0.5 02/04/2020 04:06 PM    Alk.  phosphatase 149 (H) 02/04/2020 04:06 PM    Protein, total 7.1 02/04/2020 04:06 PM    Albumin 4.7 02/04/2020 04:06 PM    A-G Ratio 2.0 02/04/2020 04:06 PM    ALT (SGPT) 29 02/04/2020 04:06 PM     Lab Results   Component Value Date/Time    Hemoglobin A1c 7.1 (H) 02/04/2020 04:06 PM    Hemoglobin A1c 7.1 (H) 11/06/2018 09:47 AM    Hemoglobin A1c 7.0 (H) 05/03/2018 10:03 AM    Hemoglobin A1c, External 6.9 06/09/2021 12:00 AM    Hemoglobin A1c, External 6.6 11/20/2020 12:00 AM      Lab Results   Component Value Date/Time    Cholesterol, total 109 02/04/2020 04:06 PM    HDL Cholesterol 37 (L) 02/04/2020 04:06 PM    LDL, calculated 52 02/04/2020 04:06 PM    VLDL, calculated 20 02/04/2020 04:06 PM    Triglyceride 98 02/04/2020 04:06 PM          ASSESSMENT/PLAN  Diagnoses and all orders for this visit:    1. Type 2 diabetes mellitus with complication, without long-term current use of insulin (HCC)  -     AMB POC HEMOGLOBIN A1C  -      DIABETES FOOT EXAM    2. Essential hypertension      DM well controlled - continue metformin  BP at goal - continue current medications and follow up with cardiology    Health Maintenance Due   Topic Date Due    Colorectal Cancer Screening Combo  Never done    DTaP/Tdap/Td series (1 - Tdap) 08/29/2005    Shingles Vaccine (1 of 2) Never done    GFR test (Diabetes, CKD 3-4, OR last GFR 15-59)  02/04/2021        Follow-up and Dispositions    Return in about 6 months (around 11/1/2023) for DIABETES FOLLOW UP. Reviewed plan of care. Patient has provided input and agrees with goals. The nurse provided the patient and/or family with advanced directive information if needed and encouraged the patient to provide a copy to the office when available.

## 2023-05-01 NOTE — PROGRESS NOTES
[facility]  Identified pt with two pt identifiers(name and ). Chief Complaint   Patient presents with    Physical    Diabetes    Hypertension       Reviewed record In preparation for visit and have obtained necessary documentation. 1. Have you been to the ER, urgent care clinic or hospitalized since your last visit? No     2. Have you seen or consulted any other health care providers outside of the [address] [place] [address] since your last visit? Include any pap smears or colon screening. Cardiology     Vitals reviewed with provider.     Health Maintenance reviewed:     Health Maintenance Due   Topic    Colorectal Cancer Screening Combo     DTaP/Tdap/Td series (1 - Tdap)    Shingles Vaccine (1 of 2)    GFR test (Diabetes, CKD 3-4, OR last GFR 15-59)     Foot Exam Q1           Wt Readings from Last 3 Encounters:   23 212 lb (96.2 kg)   23 214 lb (97.1 kg)   10/28/22 207 lb (93.9 kg)        Temp Readings from Last 3 Encounters:   23 (!) 32 °F (0 °C) (Oral)   10/28/22 97.9 °F (36.6 °C) (Oral)   22 98 °F (36.7 °C) (Oral)        BP Readings from Last 3 Encounters:   23 124/84   23 (!) 140/100   10/28/22 124/82        Pulse Readings from Last 3 Encounters:   23 80   23 72   10/28/22 84        Vitals:    23 0909   BP: 124/84   Pulse: 80   Resp: 12   Temp: (!) 32 °F (0 °C)   TempSrc: Oral   SpO2: 96%   Weight: 212 lb (96.2 kg)   Height: 5' 9\" (1.753 m)   PainSc:   0 - No pain          Learning Assessment:   :       Learning Assessment 2020   PRIMARY LEARNER Patient   PRIMARY LANGUAGE ENGLISH   LEARNER PREFERENCE PRIMARY DEMONSTRATION   ANSWERED BY Patient   RELATIONSHIP SELF        Depression Screening:   :       3 most recent PHQ Screens 2023   Little interest or pleasure in doing things Not at all   Feeling down, depressed, irritable, or hopeless Not at all   Total Score PHQ 2 0        Fall Risk Assessment:   :       Fall Risk Assessment, last [facility] 8/7/2019   Able to walk? Yes   Fall in past 12 months? No        Abuse Screening:   :       Abuse Screening Questionnaire 5/1/2023 11/17/2021 8/21/2020 8/7/2019 7/30/2018   Do you ever feel afraid of your partner? N N N N N   Are you in a relationship with someone who physically or mentally threatens you? N N N N N   Is it safe for you to go home?  Y Y Y Y Y        ADL Screening:   :       ADL Assessment 8/7/2019   Feeding yourself No Help Needed   Getting from bed to chair No Help Needed   Getting dressed No Help Needed   Bathing or showering No Help Needed   Walk across the room (includes cane/walker) No Help Needed   Using the telphone No Help Needed   Taking your medications No Help Needed   Preparing meals No Help Needed   Managing money (expenses/bills) No Help Needed   Moderately strenuous housework (laundry) No Help Needed   Shopping for personal items (toiletries/medicines) No Help Needed   Shopping for groceries No Help Needed   Driving No Help Needed   Climbing a flight of stairs No Help Needed   Getting to places beyond walking distances No Help Needed

## 2023-05-17 ENCOUNTER — NURSE ONLY (OUTPATIENT)
Age: 63
End: 2023-05-17

## 2023-05-17 ENCOUNTER — OFFICE VISIT (OUTPATIENT)
Age: 63
End: 2023-05-17

## 2023-05-17 VITALS
SYSTOLIC BLOOD PRESSURE: 132 MMHG | OXYGEN SATURATION: 97 % | DIASTOLIC BLOOD PRESSURE: 82 MMHG | WEIGHT: 213.6 LBS | HEART RATE: 84 BPM | HEIGHT: 69 IN | BODY MASS INDEX: 31.64 KG/M2

## 2023-05-17 DIAGNOSIS — I10 HTN (HYPERTENSION), BENIGN: ICD-10-CM

## 2023-05-17 DIAGNOSIS — Z95.810 PRESENCE OF BIVENTRICULAR AUTOMATIC CARDIOVERTER/DEFIBRILLATOR (AICD): Primary | ICD-10-CM

## 2023-05-17 DIAGNOSIS — Z79.899 ON AMIODARONE THERAPY: ICD-10-CM

## 2023-05-17 DIAGNOSIS — R53.83 OTHER FATIGUE: ICD-10-CM

## 2023-05-17 DIAGNOSIS — I47.29 NSVT (NONSUSTAINED VENTRICULAR TACHYCARDIA) (HCC): ICD-10-CM

## 2023-05-17 DIAGNOSIS — I25.10 ATHEROSCLEROSIS OF NATIVE CORONARY ARTERY OF NATIVE HEART WITHOUT ANGINA PECTORIS: Primary | ICD-10-CM

## 2023-05-17 DIAGNOSIS — I42.9 CARDIOMYOPATHY, UNSPECIFIED TYPE (HCC): ICD-10-CM

## 2023-05-17 DIAGNOSIS — E78.5 DYSLIPIDEMIA: ICD-10-CM

## 2023-05-17 DIAGNOSIS — Z95.810 PRESENCE OF CARDIAC RESYNCHRONIZATION THERAPY DEFIBRILLATOR (CRT-D): ICD-10-CM

## 2023-05-17 DIAGNOSIS — G47.33 OSA (OBSTRUCTIVE SLEEP APNEA): ICD-10-CM

## 2023-05-17 RX ORDER — NITROGLYCERIN 0.4 MG/1
TABLET SUBLINGUAL
COMMUNITY
Start: 2023-03-10

## 2023-05-17 RX ORDER — SPIRONOLACTONE 25 MG/1
25 TABLET ORAL DAILY
COMMUNITY
Start: 2023-04-12

## 2023-05-17 RX ORDER — SACUBITRIL AND VALSARTAN 24; 26 MG/1; MG/1
1 TABLET, FILM COATED ORAL 2 TIMES DAILY
COMMUNITY
Start: 2023-04-12 | End: 2023-05-17

## 2023-05-17 RX ORDER — ROSUVASTATIN CALCIUM 20 MG/1
1 TABLET, COATED ORAL NIGHTLY
COMMUNITY
Start: 2023-04-12

## 2023-05-17 RX ORDER — CARVEDILOL 3.12 MG/1
TABLET ORAL
COMMUNITY
Start: 2023-03-20

## 2023-05-17 RX ORDER — PRASUGREL 10 MG/1
TABLET, FILM COATED ORAL
COMMUNITY
Start: 2023-04-22

## 2023-05-17 RX ORDER — ASPIRIN 81 MG/1
81 TABLET, CHEWABLE ORAL DAILY
COMMUNITY

## 2023-05-17 RX ORDER — METFORMIN HYDROCHLORIDE 500 MG/1
1 TABLET, EXTENDED RELEASE ORAL 2 TIMES DAILY WITH MEALS
COMMUNITY
Start: 2022-09-18

## 2023-05-17 RX ORDER — AMIODARONE HYDROCHLORIDE 100 MG/1
100 TABLET ORAL DAILY
COMMUNITY
Start: 2023-04-13 | End: 2023-05-19 | Stop reason: ALTCHOICE

## 2023-05-17 ASSESSMENT — PATIENT HEALTH QUESTIONNAIRE - PHQ9
1. LITTLE INTEREST OR PLEASURE IN DOING THINGS: 0
SUM OF ALL RESPONSES TO PHQ9 QUESTIONS 1 & 2: 0
SUM OF ALL RESPONSES TO PHQ QUESTIONS 1-9: 0
SUM OF ALL RESPONSES TO PHQ QUESTIONS 1-9: 0
2. FEELING DOWN, DEPRESSED OR HOPELESS: 0
SUM OF ALL RESPONSES TO PHQ QUESTIONS 1-9: 0
SUM OF ALL RESPONSES TO PHQ QUESTIONS 1-9: 0

## 2023-05-17 NOTE — PROGRESS NOTES
rubs, clicks or gallops  Abdomen - soft, nontender, nondistended  Back exam - full range of motion, no tenderness  Neurological - cranial nerves II through XII grossly intact, no focal deficit  Musculoskeletal - no muscular tenderness noted, normal strength  Extremities - peripheral pulses normal, no pedal edema  Skin - normal coloration  no rashes    Past Medical History:   Diagnosis Date    Diabetes ([facility] [address]     Heart attack ([facility] [address] 09/2022    Hypertension      Past Surgical History:   Procedure Laterality Date    GI      hemorroids 10/2014     Family History   Problem Relation Age of Onset    Heart Disease Father 76        MI x 2     Diabetes Mother      Social History     Socioeconomic History    Marital status:      Spouse name: Not on file    Number of children: Not on file    Years of education: Not on file    Highest education level: Not on file   Occupational History    Not on file   Tobacco Use    Smoking status: Never    Smokeless tobacco: Never   Substance and Sexual Activity    Alcohol use: No    Drug use: No    Sexual activity: Not on file   Other Topics Concern    Not on file   Social History Narrative    , 3 children grown - 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      Social Determinants of Health     Financial Resource Strain: Low Risk     Difficulty of Paying Living Expenses: Not hard at all   Food Insecurity: No Food Insecurity    Worried About Running Out of Food in the Last Year: Never true    Ran Out of Food in the Last Year: Never true   Transportation Needs: Not on file   Physical Activity: Not on file   Stress: Not on file   Social Connections: Not on file   Intimate Partner Violence: Not on file   Housing Stability: Not on file       Lab Results   Component Value Date     02/04/2020    K 4.2 02/04/2020     02/04/2020    CO2 23 02/04/2020    BUN 16 02/04/2020    CREATININE 0.90 02/04/2020    GLUCOSE 101 (H) 02/04/2020    CALCIUM 9.2 02/04/2020    PROT 7.1

## 2023-05-17 NOTE — PATIENT INSTRUCTIONS
Please call the office next week if we have not contacted you about your lab results    Please call the office in 2-3 weeks to report BP readings and how your fatigue is doing    If you have an episode of midsternal chest pain please check your blood pressure and then call the office to report the episode and what your BP was that time    Please follow up with pulmonary to have your sleep apnea reassessed    Someone from cardiac rehab will contact you about starting cardiac rehab     No activity restrictions

## 2023-05-19 ENCOUNTER — TELEPHONE (OUTPATIENT)
Age: 63
End: 2023-05-19

## 2023-05-19 NOTE — TELEPHONE ENCOUNTER
Anya García, KAITLYNN - NP  Pedro Luis Dukes RN  Stopping amiodarone to maybe help with his fatigue =)   Nella Courser with patient. 2 patient identifiers used. Reviewed recommendation to stop amiodarone to maybe help with his fatigue. Patient verbalized understanding and repeated back to stop the amiodarone. Follow up with Dr. Dino Hawkins reviewed.     Future Appointments   Date Time Provider Stephan Crook   6/27/2023 10:00 AM BSRUPALI SAL ECHO 3 GEORGIE BS AMB   7/12/2023  3:00 PM MD GEORGIE Valiente BS AMB   8/29/2023  3:00 PM PACEMAKER3, JONELLE JACQUES BS AMB   8/29/2023  3:00 PM An MD GEORGIE Rico BS AMB   11/2/2023  8:30 AM Talisha Samayoa MD [facility] BS AMB

## 2023-05-24 ENCOUNTER — CLINICAL DOCUMENTATION (OUTPATIENT)
Age: 63
End: 2023-05-24

## 2023-05-24 NOTE — PROGRESS NOTES
Labs reviewed from [address] [place] [address] dated 4/28/23    BMP:   K 4.7   Cr 1.17     Lipids:     Trig 184   HDL 39   LDL 39

## 2023-05-25 ENCOUNTER — TELEPHONE (OUTPATIENT)
Age: 63
End: 2023-05-25

## 2023-05-25 NOTE — TELEPHONE ENCOUNTER
Spoke with patient. 2 patient identifiers used. Let patient know that labs were reviewed yesterday by Dr. Chanel Ford and NP. No medications changes were received. Follow up reviewed.      Future Appointments   Date Time Provider Stephan Duartei   6/27/2023 10:00 AM BSC SAL ECHO 3 GEORGIE BS AMB   7/12/2023  3:00 PM MD GEORGIE Mohr BS AMB   8/29/2023  3:00 PM PACEMAKER3, JONELLE JACQUES BS AMB   8/29/2023  3:00 PM MD GEORGIE Reich BS AMB   11/2/2023  8:30 AM Henrietta Rock MD [facility] BS AMB

## 2023-05-25 NOTE — TELEPHONE ENCOUNTER
Pt is trying to see if we have receive his Labs results from [facility] and he believe he had them done about 3 weeks ago. Pt would like to go over the results if we have received them.     [phone]

## 2023-06-26 ENCOUNTER — TELEPHONE (OUTPATIENT)
Age: 63
End: 2023-06-26

## 2023-06-26 RX ORDER — CARVEDILOL 3.12 MG/1
3.12 TABLET ORAL 2 TIMES DAILY WITH MEALS
Qty: 90 TABLET | Refills: 1 | Status: SHIPPED | OUTPATIENT
Start: 2023-06-26

## 2023-06-26 NOTE — TELEPHONE ENCOUNTER
Requested Prescriptions     Signed Prescriptions Disp Refills    carvedilol (COREG) 3.125 MG tablet 90 tablet 1     Sig: Take 1 tablet by mouth 2 times daily (with meals)     Authorizing Provider: Hallie Hawkins     Ordering User: Eden Gaffney  per NP     Future Appointments   Date Time Provider [facility] [address]   6/27/2023 10:00 AM MINDY SAL ECHO 3 GEORGIE BS AMB   7/12/2023  3:00 PM MD GEORGIE Coombs AMB   8/29/2023  3:00 PM PACEMAKER3, JONELLE CONCEPCION AMB   8/29/2023  3:00 PM An MD GEORGIE Pizano AMB   11/2/2023  8:30 AM Rosalio Galeazzi, MD [facility] BS AMB

## 2023-06-29 RX ORDER — SACUBITRIL AND VALSARTAN 49; 51 MG/1; MG/1
TABLET, FILM COATED ORAL
Qty: 60 TABLET | Refills: 2 | Status: SHIPPED | OUTPATIENT
Start: 2023-06-29

## 2023-07-12 ENCOUNTER — OFFICE VISIT (OUTPATIENT)
Age: 63
End: 2023-07-12
Payer: COMMERCIAL

## 2023-07-12 VITALS
WEIGHT: 214 LBS | BODY MASS INDEX: 31.7 KG/M2 | HEIGHT: 69 IN | DIASTOLIC BLOOD PRESSURE: 80 MMHG | OXYGEN SATURATION: 98 % | SYSTOLIC BLOOD PRESSURE: 120 MMHG | HEART RATE: 74 BPM

## 2023-07-12 DIAGNOSIS — I42.9 CARDIOMYOPATHY, UNSPECIFIED TYPE (HCC): ICD-10-CM

## 2023-07-12 DIAGNOSIS — I10 ESSENTIAL HYPERTENSION: ICD-10-CM

## 2023-07-12 DIAGNOSIS — I25.10 CORONARY ARTERY DISEASE INVOLVING NATIVE CORONARY ARTERY OF NATIVE HEART WITHOUT ANGINA PECTORIS: Primary | ICD-10-CM

## 2023-07-12 PROCEDURE — 3078F DIAST BP <80 MM HG: CPT | Performed by: INTERNAL MEDICINE

## 2023-07-12 PROCEDURE — 99214 OFFICE O/P EST MOD 30 MIN: CPT | Performed by: INTERNAL MEDICINE

## 2023-07-12 PROCEDURE — 3074F SYST BP LT 130 MM HG: CPT | Performed by: INTERNAL MEDICINE

## 2023-07-12 ASSESSMENT — PATIENT HEALTH QUESTIONNAIRE - PHQ9
SUM OF ALL RESPONSES TO PHQ QUESTIONS 1-9: 0
1. LITTLE INTEREST OR PLEASURE IN DOING THINGS: 0
2. FEELING DOWN, DEPRESSED OR HOPELESS: 0
SUM OF ALL RESPONSES TO PHQ QUESTIONS 1-9: 0
SUM OF ALL RESPONSES TO PHQ QUESTIONS 1-9: 0
SUM OF ALL RESPONSES TO PHQ9 QUESTIONS 1 & 2: 0
SUM OF ALL RESPONSES TO PHQ QUESTIONS 1-9: 0

## 2023-08-28 PROBLEM — I50.22 CHRONIC SYSTOLIC CONGESTIVE HEART FAILURE (HCC): Status: ACTIVE | Noted: 2023-08-28

## 2023-08-29 ENCOUNTER — OFFICE VISIT (OUTPATIENT)
Age: 63
End: 2023-08-29
Payer: COMMERCIAL

## 2023-08-29 ENCOUNTER — PROCEDURE VISIT (OUTPATIENT)
Age: 63
End: 2023-08-29

## 2023-08-29 VITALS
HEART RATE: 105 BPM | SYSTOLIC BLOOD PRESSURE: 104 MMHG | OXYGEN SATURATION: 96 % | HEIGHT: 69 IN | WEIGHT: 217 LBS | DIASTOLIC BLOOD PRESSURE: 70 MMHG | BODY MASS INDEX: 32.14 KG/M2

## 2023-08-29 DIAGNOSIS — E11.8 TYPE 2 DIABETES MELLITUS WITH COMPLICATION, WITHOUT LONG-TERM CURRENT USE OF INSULIN (HCC): ICD-10-CM

## 2023-08-29 DIAGNOSIS — Z95.810 AICD (AUTOMATIC CARDIOVERTER/DEFIBRILLATOR) PRESENT: ICD-10-CM

## 2023-08-29 DIAGNOSIS — I42.9 CARDIOMYOPATHY, UNSPECIFIED TYPE (HCC): ICD-10-CM

## 2023-08-29 DIAGNOSIS — I50.22 CHRONIC SYSTOLIC CONGESTIVE HEART FAILURE (HCC): Primary | ICD-10-CM

## 2023-08-29 DIAGNOSIS — G47.33 OBSTRUCTIVE SLEEP APNEA TREATED WITH BIPAP: ICD-10-CM

## 2023-08-29 DIAGNOSIS — Z95.810 INPLANTABLE CARDIOVERTER-DEFIBRILLATOR IN PLACE, PRE-OPERATIVE CARDIOVASCULAR EXAMINATION: Primary | ICD-10-CM

## 2023-08-29 DIAGNOSIS — Z01.810 INPLANTABLE CARDIOVERTER-DEFIBRILLATOR IN PLACE, PRE-OPERATIVE CARDIOVASCULAR EXAMINATION: Primary | ICD-10-CM

## 2023-08-29 DIAGNOSIS — I25.10 CORONARY ARTERY DISEASE INVOLVING NATIVE CORONARY ARTERY OF NATIVE HEART WITHOUT ANGINA PECTORIS: ICD-10-CM

## 2023-08-29 PROCEDURE — 99204 OFFICE O/P NEW MOD 45 MIN: CPT | Performed by: INTERNAL MEDICINE

## 2023-08-29 PROCEDURE — 93000 ELECTROCARDIOGRAM COMPLETE: CPT | Performed by: INTERNAL MEDICINE

## 2023-08-29 PROCEDURE — 3074F SYST BP LT 130 MM HG: CPT | Performed by: INTERNAL MEDICINE

## 2023-08-29 PROCEDURE — 3078F DIAST BP <80 MM HG: CPT | Performed by: INTERNAL MEDICINE

## 2023-08-29 ASSESSMENT — PATIENT HEALTH QUESTIONNAIRE - PHQ9
SUM OF ALL RESPONSES TO PHQ QUESTIONS 1-9: 0
1. LITTLE INTEREST OR PLEASURE IN DOING THINGS: 0
2. FEELING DOWN, DEPRESSED OR HOPELESS: 0
SUM OF ALL RESPONSES TO PHQ QUESTIONS 1-9: 0
SUM OF ALL RESPONSES TO PHQ9 QUESTIONS 1 & 2: 0

## 2023-08-29 NOTE — PATIENT INSTRUCTIONS
Remote transmission every 3 months  Monthly heart logic transmission  FU with NP in 1 year  FU with Dr. Sheri Landau in 18 months

## 2023-08-29 NOTE — PROGRESS NOTES
Cardiac Electrophysiology OFFICE Consultation Note       Assessment/Plan:   1. Chronic systolic congestive heart failure (HCC)  -     EKG 12 Lead  2. AICD (automatic cardioverter/defibrillator) present  -     EKG 12 Lead  3. Coronary artery disease involving native coronary artery of native heart without angina pectoris  -     EKG 12 Lead  4. Cardiomyopathy, unspecified type ([address])  -     EKG 12 Lead  5. Type 2 diabetes mellitus with complication, without long-term current use of insulin (HCC)  -     EKG 12 Lead  6. Obstructive sleep apnea treated with BiPAP  -     EKG 12 Lead       Primary Cardiologist: Raffi        [employer] biventricular ICD (DOI 07/29/2021):  -Implanted at [facility]. -Device check today shows proper lead & generator function. Generator longevity estimated 6 years. RA 7%, %. No events noted. Heart logic 6 today.  -Continue remote checks q 3 months.  -Follow up with me in 1 year. Mixed pattern CM:  -Noted post MI, now with normalized LVEF per echo in 06/2023.  -NYHA I.  -Continue carvedilol, Entresto, & spironolactone. He does note cough with Entresto. CAD:  -S/p PCI LAD & RCA, last in 11/2021 at [facility].  -No angina.  -Continue rosuvastatin & ASA as previously prescribed. HTN:   -BP controlled. -Continue current carvedilol, Entresto, & spironolactone. DM2:  -Hgb A1c 6.5 in 05/2023.  -Followed by PCP. -Continue metformin. Future Appointments  Date Time Provider [address] [place] [address]  11/2/2023  8:30 AM MD SEAMUS King BS AMB  1/17/2024  2:40 PM Radha Curran APRN - NP Gilford Arnt BS AMB  7/17/2024  3:00 PM MD GEORGIE Gomez BS AMB  8/15/2024 10:40 AM OSCAR3, JONELLE JACQUES BS AMB  8/15/2024 11:00 AM An MD GEORGIE Diego BS AMB      Subjective:          Karla Nesbitt is a 58 y.o. patient who presents to [facility] care, is s/p [employer] biventricular ICD (DOI 07/29/2021).     He reports doing well, has recently started a

## 2023-09-22 RX ORDER — CARVEDILOL 3.12 MG/1
3.12 TABLET ORAL 2 TIMES DAILY WITH MEALS
Qty: 180 TABLET | OUTPATIENT
Start: 2023-09-22

## 2023-09-22 NOTE — TELEPHONE ENCOUNTER
carvedilol (COREG) 3.125 MG tablet [[id number]]     Order Details  Dose: 3.125 mg Route: Oral Frequency: 2 TIMES DAILY WITH MEALS   Dispense Quantity: 90 tablet Refills: 1          Sig: Take 1 tablet by mouth 2 times daily (with meals)         Start Date: 06/26/23 End Date: --   Written Date: 06/26/23 Expiration Date: 06/25/24   Original Order:  carvedilol (COREG) 3.125 MG tablet [[id number]]     Requested Prescriptions     Refused Prescriptions Disp Refills    carvedilol (COREG) 3.125 MG tablet [Pharmacy Med Name: CARVEDILOL 3.125 MG TABLET] 180 tablet      Sig: TAKE 1 TABLET BY MOUTH TWICE A DAY WITH MEALS     Refused By: Fanny Jerez     Reason for Refusal: Patient has requested refill too soon

## 2023-09-26 ENCOUNTER — TELEPHONE (OUTPATIENT)
Age: 63
End: 2023-09-26

## 2023-09-26 RX ORDER — CARVEDILOL 3.12 MG/1
3.12 TABLET ORAL 2 TIMES DAILY WITH MEALS
Qty: 90 TABLET | Refills: 1 | Status: SHIPPED | OUTPATIENT
Start: 2023-09-26

## 2023-09-26 NOTE — TELEPHONE ENCOUNTER
Requested Prescriptions     Signed Prescriptions Disp Refills    carvedilol (COREG) 3.125 MG tablet 90 tablet 1     Sig: Take 1 tablet by mouth 2 times daily (with meals)     Authorizing Provider: Deisy Rivero     Ordering User: Suleman Tabares     Per VO per MD  Future Appointments   Date Time Provider [address]   11/2/2023  8:30 AM MD SEAMUS Bhagat BS AMB   1/17/2024  2:40 PM KAITLYNN Smalls NP BS AMB   7/17/2024  3:00 PM MD GEORGIE Mcguire BS AMB   8/26/2024  1:00 PM PACEMAKER3, JONELLE CONCEPCION AMB   8/26/2024  1:20 PM KAITLYNN Poon NP AMB   2/25/2025  1:20 PM PACEMAKER3, JONELLE CONCEPCION AMB   2/25/2025  1:40 PM MD GEORGIE Jensen BS AMB

## 2023-10-06 PROCEDURE — 93297 REM INTERROG DEV EVAL ICPMS: CPT | Performed by: INTERNAL MEDICINE

## 2023-10-06 PROCEDURE — G2066 INTER DEVC REMOTE 30D: HCPCS | Performed by: INTERNAL MEDICINE

## 2023-10-06 RX ORDER — SPIRONOLACTONE 25 MG/1
25 TABLET ORAL DAILY
Qty: 90 TABLET | Refills: 1 | Status: SHIPPED | OUTPATIENT
Start: 2023-10-06

## 2023-10-06 RX ORDER — ROSUVASTATIN CALCIUM 20 MG/1
TABLET, COATED ORAL NIGHTLY
Qty: 90 TABLET | Refills: 1 | Status: SHIPPED | OUTPATIENT
Start: 2023-10-06

## 2023-10-06 NOTE — TELEPHONE ENCOUNTER
Requested Prescriptions     Signed Prescriptions Disp Refills    spironolactone (ALDACTONE) 25 MG tablet 90 tablet 1     Sig: TAKE 1 TABLET BY MOUTH EVERY DAY     Authorizing Provider: Luther Mckenzie     Ordering User: Jero Wright    rosuvastatin (CRESTOR) 20 MG tablet 90 tablet 1     Sig: TAKE 1 TABLET BY MOUTH NIGHTLY     Authorizing Provider: Luther Mckenzie     Ordering User: Jero Kyle     Per VO per MD, labs in media 5/2023    Future Appointments   Date Time Provider [address] [place] [address]   11/2/2023  8:30 AM MD SEAMUS Hernandez BS AMB   1/17/2024  2:40 PM Ami Sprain, APRN - NP [facility] BS AMB   7/17/2024  3:00 PM MD GEORGIE Holland AMB   8/26/2024  1:00 PM PACEMAKER3, JONELLE CONCEPCION AMB   8/26/2024  1:20 PM Nilesh Solid, APREVELYNE CONCEPCION AMB   2/25/2025  1:20 PM PACEMAKER3, JONELLE CONCEPCION AMB   2/25/2025  1:40 PM Belen Rolon MD [facility] BS AMB

## 2023-10-10 RX ORDER — SACUBITRIL AND VALSARTAN 49; 51 MG/1; MG/1
1 TABLET, FILM COATED ORAL 2 TIMES DAILY
Qty: 180 TABLET | Refills: 1 | Status: SHIPPED | OUTPATIENT
Start: 2023-10-10

## 2023-10-10 NOTE — TELEPHONE ENCOUNTER
Requested Prescriptions     Signed Prescriptions Disp Refills    sacubitril-valsartan (ENTRESTO) 49-51 MG per tablet 180 tablet 1     Sig: TAKE 1 TABLET BY MOUTH TWICE A DAY     Authorizing Provider: Argenis Karimi     Ordering User: Bigg Gaffney VO per MD    Future Appointments   Date Time Provider [address]   11/2/2023  8:30 AM MD SEAMUS Hernandez AMB   1/17/2024  2:40 PM KAITLYNN Guo NP AMB   7/17/2024  3:00 PM MD GEORGIE Aquino AMB   8/26/2024  1:00 PM PACEMAKER3, JONELLE CONCEPCION AMB   8/26/2024  1:20 PM KAITLYNN Ying NP AMB   2/25/2025  1:20 PM PACEMAKER3, JONELLE CONCEPCION AMB   2/25/2025  1:40 PM Belen Rolon MD [facility] BS AMB

## 2023-11-02 ENCOUNTER — OFFICE VISIT (OUTPATIENT)
Facility: CLINIC | Age: 63
End: 2023-11-02
Payer: COMMERCIAL

## 2023-11-02 VITALS
SYSTOLIC BLOOD PRESSURE: 121 MMHG | HEART RATE: 93 BPM | HEIGHT: 69 IN | OXYGEN SATURATION: 95 % | RESPIRATION RATE: 12 BRPM | WEIGHT: 218 LBS | BODY MASS INDEX: 32.29 KG/M2 | DIASTOLIC BLOOD PRESSURE: 82 MMHG

## 2023-11-02 DIAGNOSIS — I42.9 CARDIOMYOPATHY, UNSPECIFIED TYPE (HCC): ICD-10-CM

## 2023-11-02 DIAGNOSIS — E11.8 TYPE 2 DIABETES MELLITUS WITH COMPLICATION, WITHOUT LONG-TERM CURRENT USE OF INSULIN (HCC): Primary | ICD-10-CM

## 2023-11-02 DIAGNOSIS — E78.2 MIXED HYPERLIPIDEMIA: ICD-10-CM

## 2023-11-02 DIAGNOSIS — I50.22 CHRONIC SYSTOLIC CONGESTIVE HEART FAILURE (HCC): ICD-10-CM

## 2023-11-02 DIAGNOSIS — I10 ESSENTIAL HYPERTENSION: ICD-10-CM

## 2023-11-02 LAB — HBA1C MFR BLD: 6.8 %

## 2023-11-02 PROCEDURE — 3074F SYST BP LT 130 MM HG: CPT | Performed by: INTERNAL MEDICINE

## 2023-11-02 PROCEDURE — 83036 HEMOGLOBIN GLYCOSYLATED A1C: CPT | Performed by: INTERNAL MEDICINE

## 2023-11-02 PROCEDURE — 3079F DIAST BP 80-89 MM HG: CPT | Performed by: INTERNAL MEDICINE

## 2023-11-02 PROCEDURE — 99214 OFFICE O/P EST MOD 30 MIN: CPT | Performed by: INTERNAL MEDICINE

## 2023-11-02 RX ORDER — METFORMIN HYDROCHLORIDE 500 MG/1
500 TABLET, EXTENDED RELEASE ORAL 2 TIMES DAILY WITH MEALS
Qty: 180 TABLET | Refills: 4 | Status: SHIPPED | OUTPATIENT
Start: 2023-11-02

## 2023-11-02 SDOH — ECONOMIC STABILITY: INCOME INSECURITY: HOW HARD IS IT FOR YOU TO PAY FOR THE VERY BASICS LIKE FOOD, HOUSING, MEDICAL CARE, AND HEATING?: NOT HARD AT ALL

## 2023-11-02 SDOH — ECONOMIC STABILITY: FOOD INSECURITY: WITHIN THE PAST 12 MONTHS, YOU WORRIED THAT YOUR FOOD WOULD RUN OUT BEFORE YOU GOT MONEY TO BUY MORE.: NEVER TRUE

## 2023-11-02 SDOH — ECONOMIC STABILITY: FOOD INSECURITY: WITHIN THE PAST 12 MONTHS, THE FOOD YOU BOUGHT JUST DIDN'T LAST AND YOU DIDN'T HAVE MONEY TO GET MORE.: NEVER TRUE

## 2023-11-02 SDOH — ECONOMIC STABILITY: HOUSING INSECURITY
IN THE LAST 12 MONTHS, WAS THERE A TIME WHEN YOU DID NOT HAVE A STEADY PLACE TO SLEEP OR SLEPT IN A SHELTER (INCLUDING NOW)?: NO

## 2023-11-03 LAB
ALBUMIN SERPL-MCNC: 4.2 G/DL (ref 3.5–5)
ALBUMIN/GLOB SERPL: 1.2 (ref 1.1–2.2)
ALP SERPL-CCNC: 96 U/L (ref 45–117)
ALT SERPL-CCNC: 24 U/L (ref 12–78)
ANION GAP SERPL CALC-SCNC: 7 MMOL/L (ref 5–15)
AST SERPL-CCNC: 16 U/L (ref 15–37)
BILIRUB SERPL-MCNC: 0.7 MG/DL (ref 0.2–1)
BUN SERPL-MCNC: 19 MG/DL (ref 6–20)
BUN/CREAT SERPL: 16 (ref 12–20)
CALCIUM SERPL-MCNC: 8.9 MG/DL (ref 8.5–10.1)
CHLORIDE SERPL-SCNC: 104 MMOL/L (ref 97–108)
CHOLEST SERPL-MCNC: 241 MG/DL
CO2 SERPL-SCNC: 24 MMOL/L (ref 21–32)
COMMENT:: NORMAL
CREAT SERPL-MCNC: 1.2 MG/DL (ref 0.7–1.3)
CREAT UR-MCNC: 238 MG/DL
GLOBULIN SER CALC-MCNC: 3.4 G/DL (ref 2–4)
GLUCOSE SERPL-MCNC: 146 MG/DL (ref 65–100)
HDLC SERPL-MCNC: 38 MG/DL
HDLC SERPL: 6.3 (ref 0–5)
LDLC SERPL CALC-MCNC: 154 MG/DL (ref 0–100)
MICROALBUMIN UR-MCNC: 0.87 MG/DL
MICROALBUMIN/CREAT UR-RTO: 4 MG/G (ref 0–30)
POTASSIUM SERPL-SCNC: 5 MMOL/L (ref 3.5–5.1)
PROT SERPL-MCNC: 7.6 G/DL (ref 6.4–8.2)
SODIUM SERPL-SCNC: 135 MMOL/L (ref 136–145)
SPECIMEN HOLD: NORMAL
TRIGL SERPL-MCNC: 245 MG/DL
VLDLC SERPL CALC-MCNC: 49 MG/DL

## 2023-11-23 PROCEDURE — 93297 REM INTERROG DEV EVAL ICPMS: CPT | Performed by: INTERNAL MEDICINE

## 2023-12-23 DIAGNOSIS — I25.10 CORONARY ARTERY DISEASE INVOLVING NATIVE CORONARY ARTERY OF NATIVE HEART WITHOUT ANGINA PECTORIS: ICD-10-CM

## 2023-12-23 DIAGNOSIS — I50.22 CHRONIC SYSTOLIC CONGESTIVE HEART FAILURE (HCC): Primary | ICD-10-CM

## 2023-12-26 RX ORDER — CARVEDILOL 3.12 MG/1
3.12 TABLET ORAL 2 TIMES DAILY WITH MEALS
Qty: 180 TABLET | Refills: 3 | Status: SHIPPED | OUTPATIENT
Start: 2023-12-26

## 2023-12-26 NOTE — TELEPHONE ENCOUNTER
Per VO by MD.    Future Appointments   Date Time Provider [address]  [facility]   1/23/2024  9:00 AM Jakob Barnes, KAITLYNN - MEI JACQUES BS AMB   5/2/2024  8:30 AM MD SEAMUS Omer BS AMB   7/17/2024  3:00 PM MD GEORGIE Hightower BS AMB   8/26/2024  1:00 PM PACEMAKER3, JONELLE JACQUES BS AMB   8/26/2024  1:20 PM Kathleene Kocher, APRN - MEI JACQUES BS AMB   2/25/2025  1:20 PM PACEMAKER3, JONELLE CONCEPCION AMB   2/25/2025  1:40 PM Belen Rolon MD [facility] BS AMB

## 2024-01-07 PROCEDURE — 93297 REM INTERROG DEV EVAL ICPMS: CPT | Performed by: INTERNAL MEDICINE

## 2024-01-16 NOTE — PROGRESS NOTES
Dr. Dumont                              [facility].  [phone]               Cardiology Consult/Progress Note        Requesting/referring provider: Alayna De La Garza MD       Reason for Consult: follow up       Assessment/Plan:   1.  HFiEF/Cardiomyopathy mixed pattern with improvement in EF following CRT-D   2.  Coronary artery disease with prior PCI in LAD as well as RCA, last intervention in November 2021 at [facility].   3.  Hyper lipidemia -Last LDL 11/2/23 154 off Crestor.    4.  Hypertension now with improved blood pressure control  5. Atypical Chest pain     Mr. Banda is doing well from CAD standpoint.  His overall LV function is preserved and he does not demonstrate any signs of congestive heart failure post PCI/post MI.  He is on GDMT for post MI LV dysfunction with Coreg, Entresto, Aldactone. EF has now normalized.   His K was 5.0 11/2023- will check BMP today.       Intermittently he still reports chest discomfort which appears atypical by history. He has used his SL ntg to relieve pain. Will schedule Exercise Nuc.    He is exercising at [facility] 3 days a week 20-30min on treadmill without issue, so this is unlikely cardiac in nature.  He remains off crestor due to myalgias. Now taking CQ 10 and advised to start Zetia.  Now with bothersome complaints of ED- advised to fu with urologist given urological hx. Discussed  risk of ntg with ED meds.    Fu with  in July      Total time spent 40 minutes  Greater than 50% of time was spent educating and counseling patient and/or coordinating care concerning today's medical issues. See body of note for details regarding counseling     Investigations ordered      []    High complexity decision making was performed   []    Patient is at high-risk of decompensation  with multiple organ involvement   []    Complex/difficult social determinants of  health outcomes   Total of 65 minutes were spent on reviewing the records,

## 2024-01-23 ENCOUNTER — OFFICE VISIT (OUTPATIENT)
Age: 64
End: 2024-01-23

## 2024-01-23 VITALS
HEART RATE: 83 BPM | WEIGHT: 220.1 LBS | OXYGEN SATURATION: 96 % | DIASTOLIC BLOOD PRESSURE: 84 MMHG | SYSTOLIC BLOOD PRESSURE: 134 MMHG | BODY MASS INDEX: 32.5 KG/M2

## 2024-01-23 DIAGNOSIS — I10 ESSENTIAL HYPERTENSION: ICD-10-CM

## 2024-01-23 DIAGNOSIS — Z95.810 AICD (AUTOMATIC CARDIOVERTER/DEFIBRILLATOR) PRESENT: ICD-10-CM

## 2024-01-23 DIAGNOSIS — I50.22 CHRONIC SYSTOLIC CONGESTIVE HEART FAILURE (HCC): Primary | ICD-10-CM

## 2024-01-23 DIAGNOSIS — I42.9 CARDIOMYOPATHY, UNSPECIFIED TYPE (HCC): ICD-10-CM

## 2024-01-23 DIAGNOSIS — R07.89 OTHER CHEST PAIN: ICD-10-CM

## 2024-01-23 DIAGNOSIS — I25.10 CORONARY ARTERY DISEASE INVOLVING NATIVE CORONARY ARTERY OF NATIVE HEART WITHOUT ANGINA PECTORIS: ICD-10-CM

## 2024-01-23 DIAGNOSIS — E78.2 MIXED HYPERLIPIDEMIA: ICD-10-CM

## 2024-01-23 DIAGNOSIS — I50.22 CHRONIC SYSTOLIC CONGESTIVE HEART FAILURE (HCC): ICD-10-CM

## 2024-01-23 RX ORDER — EZETIMIBE 10 MG/1
10 TABLET ORAL DAILY
Qty: 90 TABLET | Refills: 3 | Status: SHIPPED | OUTPATIENT
Start: 2024-01-23

## 2024-01-23 ASSESSMENT — PATIENT HEALTH QUESTIONNAIRE - PHQ9
2. FEELING DOWN, DEPRESSED OR HOPELESS: 0
SUM OF ALL RESPONSES TO PHQ9 QUESTIONS 1 & 2: 0
1. LITTLE INTEREST OR PLEASURE IN DOING THINGS: 0
SUM OF ALL RESPONSES TO PHQ QUESTIONS 1-9: 0

## 2024-01-23 NOTE — PATIENT INSTRUCTIONS
Please get your blood work today downstairs.  Maria follow up with your Urologist about your ED. You are ok to to start a medication as long as you are not taking nitro.     Please start Zetia for your cholesterol    You will be scheduled for a Nuclear Stress Test after your appointment today.    Nuclear stress testing evaluates blood flow to your heart muscle and assesses cardiac function. There are 2 parts (Rest/Stress) to this procedure and will include either an exercise on a treadmill or an IV administration of a stressing medication called Lexiscan. Your cardiologist will determine which type of testing is best for you. This test can be performed in one day unless it is determined that better quality images will be obtained by performing the test over two days.     *Please arrive 15 minutes prior to your appointment time    Test Duration (This is determined by your height, weight, and other risk factors):    -One day testing will take 4 hours   -Two day testing will take 2 hours each day. Your second day(resting images) will be scheduled after the first day is completed    Day of testing instructions:    NO CAFFEINE (not even decaffeinated products) 24 HOURS PRIOR TO TESTING. This includes coffee, soda, tea, chocolate, multivitamins, and migraine medication, like Excedrin or Fioricet that contains caffeine.  Nothing to eat or drink 4 HOURS prior to testing  NO NICOTINE 12 hours prior to testing  Hold any medications requested by your cardiologist. Otherwise take medications as directed with a few sips of water. If you are unsure you may bring your medications with you to take after instructed by your stressing nurse. It is recommended you hold CARVEDILOL prior to your test. DIABETIC PATIENTS: Take half of your insulin with a light meal 4 hours before your test.  Wear comfortable clothes and shoes (Shirts with no metal, shorts or pants, tennis shoes, no heels or flip flops)    IMPORTANT: This testing involves a

## 2024-01-24 LAB
ANION GAP SERPL CALC-SCNC: 4 MMOL/L (ref 5–15)
BUN SERPL-MCNC: 16 MG/DL (ref 6–20)
BUN/CREAT SERPL: 15 (ref 12–20)
CALCIUM SERPL-MCNC: 9.6 MG/DL (ref 8.5–10.1)
CHLORIDE SERPL-SCNC: 105 MMOL/L (ref 97–108)
CO2 SERPL-SCNC: 28 MMOL/L (ref 21–32)
CREAT SERPL-MCNC: 1.09 MG/DL (ref 0.7–1.3)
GLUCOSE SERPL-MCNC: 196 MG/DL (ref 65–100)
POTASSIUM SERPL-SCNC: 5 MMOL/L (ref 3.5–5.1)
SODIUM SERPL-SCNC: 137 MMOL/L (ref 136–145)

## 2024-01-25 ENCOUNTER — TELEPHONE (OUTPATIENT)
Age: 64
End: 2024-01-25

## 2024-01-25 DIAGNOSIS — I10 ESSENTIAL HYPERTENSION: Primary | ICD-10-CM

## 2024-01-25 NOTE — TELEPHONE ENCOUNTER
Pt would like to know if there is another medication that is just as good as Entresto because for a 90 day supply it costs him $135 or something that is less expensive. Pt stated he just would like to know what's best for him. Pt would like a callback.     Pt ph# [phone]

## 2024-01-25 NOTE — RESULT ENCOUNTER NOTE
Dear Mr. Saldana,  Good News!  Your test results are stable.   Please continue the current treatment plan.  We can discuss more at your scheduled follow up if you have questions.  Best Regards,  KAITLYNN Hernandez NP

## 2024-01-25 NOTE — TELEPHONE ENCOUNTER
Telephone call made to patient. Two patient identifiers verified.   Explained I would try covermymeds and discuss if there's an alternative with Dr. Dumont and MEI Herrera. Pt verbalized understanding. All questions answered

## 2024-01-26 RX ORDER — LOSARTAN POTASSIUM 50 MG/1
50 TABLET ORAL DAILY
Qty: 90 TABLET | Refills: 1 | Status: SHIPPED | OUTPATIENT
Start: 2024-01-26

## 2024-01-26 NOTE — TELEPHONE ENCOUNTER
Requested Prescriptions     Signed Prescriptions Disp Refills    losartan (COZAAR) 50 MG tablet 90 tablet 1     Sig: Take 1 tablet by mouth daily     Authorizing Provider: JACK DUMONT     Ordering User: ESAU JOHNSON per NP    Future Appointments   Date Time Provider Department Center   1/31/2024  1:00 PM [facility] JONELLE [facility] Felicita JACQUES BS AMB   5/2/2024  8:30 AM Alayna De La Garza MD [facility] BS AMB   7/17/2024  3:00 PM Jack Dumont MD [facility] BS AMB   8/26/2024  1:00 PM PACEMAKER3, JONELLE JACQUES BS AMB   8/26/2024  1:20 PM Pily Lopez, APRN - NP GEORGIE CONCEPCION AMB   2/25/2025  1:20 PM PACEMAKER3, JONELLE CONCEPCION AMB   2/25/2025  1:40 PM Belen Rolon MD [facility] BS AMB

## 2024-02-05 ENCOUNTER — TELEPHONE (OUTPATIENT)
Age: 64
End: 2024-02-05

## 2024-02-05 DIAGNOSIS — I10 ESSENTIAL HYPERTENSION: ICD-10-CM

## 2024-02-05 RX ORDER — LOSARTAN POTASSIUM 50 MG/1
100 TABLET ORAL DAILY
Qty: 90 TABLET | Refills: 1 | Status: SHIPPED | OUTPATIENT
Start: 2024-02-05 | End: 2024-02-07 | Stop reason: SDUPTHER

## 2024-02-05 NOTE — TELEPHONE ENCOUNTER
----- Message from Ellie Osullivan RN sent at 2/5/2024  8:31 AM EST -----  Regarding: FW: Jasvir  Contact: [phone]    ----- Message -----  From: Carlos Saldana  Sent: 2/4/2024  10:34 PM EST  To: Antonio Sosa Clinical Staff  Subject: Entresto                                         This is not working my blood pressure is screaming again.  I feel tired and worn out.  Pressure is up and down. Please call me in the morning.  [phone].   It is Sunday night at 11:00 pm and my pressure 145/95.

## 2024-02-05 NOTE — TELEPHONE ENCOUNTER
Telephone call made to patient. Two patient identifiers verified.   Pt verbalizes that he has been tired with occasional dizziness after changing entresto to losartan. BP has been elevated 150s/100, 140s/95 per pt.    We changed his entresto to losartan because entresto was $135 and pt wanted a more affordable option and LV recovered to normal. Pt has some leftover entresto 24-26 and 49-51; he wants to do what's best for his BP but price too high for long term

## 2024-02-05 NOTE — TELEPHONE ENCOUNTER
Increase losartan to 100mg PO daily.    Let us know how BP doing in 5-7 days.    Pt stated understanding.

## 2024-02-07 DIAGNOSIS — I10 HTN (HYPERTENSION), BENIGN: Primary | ICD-10-CM

## 2024-02-07 DIAGNOSIS — I10 ESSENTIAL HYPERTENSION: ICD-10-CM

## 2024-02-07 RX ORDER — LOSARTAN POTASSIUM 100 MG/1
100 TABLET ORAL DAILY
Qty: 90 TABLET | Refills: 1 | Status: SHIPPED | OUTPATIENT
Start: 2024-02-07

## 2024-02-27 ENCOUNTER — TELEPHONE (OUTPATIENT)
Age: 64
End: 2024-02-27

## 2024-02-27 NOTE — TELEPHONE ENCOUNTER
Telephone call made to patient. Two patient identifiers verified.   Pt BP better under control (130/70s). Pt advised to keep taking losartan and spironolactone. Pt verbalizes understanding. All questions answered    Future Appointments   Date Time Provider Department Center   5/2/2024  8:30 AM Alayna De La Garza MD [facility] BS AMB   7/17/2024  3:00 PM Ron Dumont MD [facility] BS AMB   8/26/2024  1:00 PM PACEMAKER3, JONELLE CONCEPCION AMB   8/26/2024  1:20 PM Pily Lopez, APRN - NP GEORGIE CONCEPCION AMB   2/25/2025  1:20 PM PACEMAKER3, JONELLE CONCEPCION AMB   2/25/2025  1:40 PM Belen Rolon MD [facility] BS AMB

## 2024-03-05 ENCOUNTER — PATIENT MESSAGE (OUTPATIENT)
Age: 64
End: 2024-03-05

## 2024-03-05 DIAGNOSIS — I10 ESSENTIAL HYPERTENSION: Primary | ICD-10-CM

## 2024-03-05 RX ORDER — AMLODIPINE BESYLATE 5 MG/1
5 TABLET ORAL DAILY
Qty: 90 TABLET | Refills: 0 | Status: SHIPPED | OUTPATIENT
Start: 2024-03-05

## 2024-03-05 NOTE — TELEPHONE ENCOUNTER
From: Carlos Saldana  To: Dr. Ron Dumont  Sent: 3/5/2024 10:57 AM EST  Subject: Blood pressure    My blood pressure is going up again. I am on Losartin.  had to take an extra pill yesterday to bring it down.  been running around the 150-170 over 100- 102.   This is concerning me. Is there something wrong? Do I need a medication change or what do you recommend??   thanks  Carlos

## 2024-03-05 NOTE — TELEPHONE ENCOUNTER
Telephone call made to patient. Two patient identifiers verified.   Pt advised to start 5mg amlodipine per memo. Pt verbalizes understanding. Will keep BP log for 1-2 weeks and limit caffeine. Admits to some increased stress. All questions answered.

## 2024-03-10 PROCEDURE — 93297 REM INTERROG DEV EVAL ICPMS: CPT | Performed by: INTERNAL MEDICINE

## 2024-04-08 RX ORDER — SPIRONOLACTONE 25 MG/1
25 TABLET ORAL DAILY
Qty: 90 TABLET | Refills: 1 | Status: SHIPPED | OUTPATIENT
Start: 2024-04-08

## 2024-04-08 NOTE — TELEPHONE ENCOUNTER
Requested Prescriptions     Signed Prescriptions Disp Refills    spironolactone (ALDACTONE) 25 MG tablet 90 tablet 1     Sig: TAKE 1 TABLET BY MOUTH EVERY DAY     Authorizing Provider: JACK DUMONT     Ordering User: ABRAHAM OROZCO     Verbal order per Dr. Dumont.    Future Appointments   Date Time Provider Department Center   5/2/2024  8:30 AM Alayna De La Garza MD [facility] BS AMB   7/17/2024  3:00 PM Jack Dumont MD [facility] BS AMB   8/26/2024  1:00 PM PACEMAKER3, JONELLE JACQUES BS AMB   8/26/2024  1:20 PM Pily Lopez, APRN - MEI JACQUES BS AMB   2/25/2025  1:20 PM PACEMAKER3, JONELLE JACQUES BS AMB   2/25/2025  1:40 PM Belen Rolon MD [facility] BS AMB

## 2024-05-02 ENCOUNTER — OFFICE VISIT (OUTPATIENT)
Facility: CLINIC | Age: 64
End: 2024-05-02
Payer: COMMERCIAL

## 2024-05-02 VITALS
DIASTOLIC BLOOD PRESSURE: 86 MMHG | WEIGHT: 224 LBS | SYSTOLIC BLOOD PRESSURE: 124 MMHG | BODY MASS INDEX: 33.18 KG/M2 | HEART RATE: 76 BPM | RESPIRATION RATE: 12 BRPM | OXYGEN SATURATION: 100 % | HEIGHT: 69 IN

## 2024-05-02 DIAGNOSIS — I25.10 CORONARY ARTERY DISEASE INVOLVING NATIVE CORONARY ARTERY OF NATIVE HEART WITHOUT ANGINA PECTORIS: ICD-10-CM

## 2024-05-02 DIAGNOSIS — Z12.11 COLON CANCER SCREENING: ICD-10-CM

## 2024-05-02 DIAGNOSIS — E78.2 MIXED HYPERLIPIDEMIA: ICD-10-CM

## 2024-05-02 DIAGNOSIS — E11.8 TYPE 2 DIABETES MELLITUS WITH COMPLICATION, WITHOUT LONG-TERM CURRENT USE OF INSULIN (HCC): Primary | ICD-10-CM

## 2024-05-02 DIAGNOSIS — I10 ESSENTIAL HYPERTENSION: ICD-10-CM

## 2024-05-02 PROCEDURE — 3079F DIAST BP 80-89 MM HG: CPT | Performed by: INTERNAL MEDICINE

## 2024-05-02 PROCEDURE — 99214 OFFICE O/P EST MOD 30 MIN: CPT | Performed by: INTERNAL MEDICINE

## 2024-05-02 PROCEDURE — 3074F SYST BP LT 130 MM HG: CPT | Performed by: INTERNAL MEDICINE

## 2024-05-02 RX ORDER — NITROGLYCERIN 0.4 MG/1
TABLET SUBLINGUAL
Qty: 25 TABLET | Refills: 1 | Status: SHIPPED | OUTPATIENT
Start: 2024-05-02

## 2024-05-02 NOTE — PROGRESS NOTES
HPI  Mr. Carlos Saldana is a 63 y.o. year old male, he is seen today for follow up DM, HTN.  Plan last visit:  CHF managed by cardiology - last EF 55-60% in 5/2023 - encouraged him to call about stopping spironolactone and crestor  BP at goal  DM - A1C at goal - continue metformin    Cardiology prescribed losartan 100 mg daily but patient has been taking 50 mg instead for unclear reasons - seems pharmacy filled old rx.  He was advised to start zetia with co-q 10 due to myalgia with crestor.     Has been doing \"pretty good\". Has occasional chest pain, has discussed with cardiology - not new. Has been having chest pain off and on for a few years. Negative stress test in 1/31/24 for same complaints. Has slntg for prn use - sometimes helps chest pain, sometimes it doesn't.     DM - glucose has been good when he checks but has been few weeks since he checked - normally in 130s as highest    No PND or orthopnea, no SOB or edema.     BP at home has been little higher at home - but notes after doing breathing exercises will drop.     Stopped spironolactone about 2 weeks ago - and felt better. Says it caused \"chest pain\". Advised he let cardiology know he isn't taking it.     Saw urology in February - had PSA which was reportedly normal.     Swims normally twice a week at [facility], also lots of yard work.         Chief Complaint   Patient presents with    Diabetes    Hypertension        Prior to Admission medications    Medication Sig Start Date End Date Taking? Authorizing Provider   nitroGLYCERIN (NITROSTAT) 0.4 MG SL tablet TAKE 1 TABLET SUBLINGUALLY AS NEEDED FOR CHEST PAIN 5/2/24  Yes Alayna De La Garza MD   amLODIPine (NORVASC) 5 MG tablet Take 1 tablet by mouth daily 3/5/24  Yes Ron Dumont MD   losartan (COZAAR) 100 MG tablet Take 1 tablet by mouth daily  Patient taking differently: Take 0.5 tablets by mouth daily 2/7/24  Yes Ron Dumont MD   ezetimibe (ZETIA) 10 MG tablet Take 1 tablet by mouth daily 1/23/24  Yes

## 2024-05-02 NOTE — PROGRESS NOTES
Carlos Saldana  Identified pt with two pt identifiers(name and ).     Chief Complaint   Patient presents with    Diabetes    Hypertension       Reviewed record In preparation for visit and have obtained necessary documentation.     1. Have you been to the ER, urgent care clinic or hospitalized since your last visit? No     2. Have you seen or consulted any other health care providers outside of the [facility] since your last visit? Include any pap smears or colon screening. No    Vitals reviewed with provider.    Health Maintenance reviewed:     Health Maintenance Due   Topic    Colorectal Cancer Screen     DTaP/Tdap/Td vaccine (1 - Tdap)    Respiratory Syncytial Virus (RSV) Pregnant or age 60 yrs+ (1 - 1-dose 60+ series)    Diabetic retinal exam     Diabetic foot exam           Wt Readings from Last 3 Encounters:   24 101.6 kg (224 lb)   24 99.8 kg (220 lb)   24 99.8 kg (220 lb 1.6 oz)        Temp Readings from Last 3 Encounters:   No data found for Temp        BP Readings from Last 3 Encounters:   24 (!) 147/87   24 130/82   24 134/84        Pulse Readings from Last 3 Encounters:   24 76   24 91   24 83      [unfilled]       Learning Assessment:   :         2023     8:30 AM   [facility] AMB LEARNING ASSESSMENT   Primary Learner Patient   level of education [employer]   Barriers Factors NONE   co-learner caregiver No   Primary Language ENGLISH   Learning Preference DEMONSTRATION   Answered By PATIENT   Relationship to Learner SELF         Fall Risk Assessment:   :          No data to display                   Abuse Screening:   :         2023     8:00 AM   AMB Abuse Screening   Do you ever feel afraid of your partner? N   Are you in a relationship with someone who physically or mentally threatens you? N   Is it safe for you to go home? Y          ADL Screening:   :         2023     8:00 AM   ADL ASSESSMENT   Feeding yourself No Help

## 2024-05-03 LAB
ALBUMIN SERPL-MCNC: 3.9 G/DL (ref 3.5–5)
ALBUMIN/GLOB SERPL: 1.2 (ref 1.1–2.2)
ALP SERPL-CCNC: 97 U/L (ref 45–117)
ALT SERPL-CCNC: 29 U/L (ref 12–78)
ANION GAP SERPL CALC-SCNC: 4 MMOL/L (ref 5–15)
AST SERPL-CCNC: 17 U/L (ref 15–37)
BILIRUB SERPL-MCNC: 0.6 MG/DL (ref 0.2–1)
BUN SERPL-MCNC: 19 MG/DL (ref 6–20)
BUN/CREAT SERPL: 16 (ref 12–20)
CALCIUM SERPL-MCNC: 9 MG/DL (ref 8.5–10.1)
CHLORIDE SERPL-SCNC: 106 MMOL/L (ref 97–108)
CHOLEST SERPL-MCNC: 172 MG/DL
CO2 SERPL-SCNC: 27 MMOL/L (ref 21–32)
CREAT SERPL-MCNC: 1.21 MG/DL (ref 0.7–1.3)
EST. AVERAGE GLUCOSE BLD GHB EST-MCNC: 146 MG/DL
GLOBULIN SER CALC-MCNC: 3.3 G/DL (ref 2–4)
GLUCOSE SERPL-MCNC: 132 MG/DL (ref 65–100)
HBA1C MFR BLD: 6.7 % (ref 4–5.6)
HDLC SERPL-MCNC: 39 MG/DL
HDLC SERPL: 4.4 (ref 0–5)
LDLC SERPL CALC-MCNC: 100.2 MG/DL (ref 0–100)
POTASSIUM SERPL-SCNC: 4.4 MMOL/L (ref 3.5–5.1)
PROT SERPL-MCNC: 7.2 G/DL (ref 6.4–8.2)
SODIUM SERPL-SCNC: 137 MMOL/L (ref 136–145)
TRIGL SERPL-MCNC: 164 MG/DL
VLDLC SERPL CALC-MCNC: 32.8 MG/DL

## 2024-05-29 ENCOUNTER — TELEPHONE (OUTPATIENT)
Age: 64
End: 2024-05-29

## 2024-05-29 DIAGNOSIS — I10 ESSENTIAL HYPERTENSION: ICD-10-CM

## 2024-05-29 RX ORDER — AMLODIPINE BESYLATE 5 MG/1
5 TABLET ORAL DAILY
Qty: 90 TABLET | Refills: 2 | Status: SHIPPED | OUTPATIENT
Start: 2024-05-29

## 2024-05-29 NOTE — TELEPHONE ENCOUNTER
Requested Prescriptions     Signed Prescriptions Disp Refills    amLODIPine (NORVASC) 5 MG tablet 90 tablet 2     Sig: TAKE 1 TABLET BY MOUTH EVERY DAY     Authorizing Provider: JACK DUMONT     Ordering User: ESAU JOHNSON per MD    Future Appointments   Date Time Provider Department Center   7/17/2024  3:00 PM Jack Dumont MD [facility]   8/26/2024  1:00 PM [email or web address], JONELLE JACQUES [facility] AMB   8/26/2024  1:20 PM Pily Lopez, APRN - NP [facility] AMB   11/7/2024  9:10 AM Alayna De La Garza MD [facility] AMB   2/25/2025  1:20 PM [email or web address], JONELLE JACQU[facility] AMB   2/25/2025  1:40 PM Belen Rolon MD [facility]

## 2024-05-31 RX ORDER — AMLODIPINE BESYLATE 5 MG/1
5 TABLET ORAL DAILY
Qty: 90 TABLET | Refills: 0 | OUTPATIENT
Start: 2024-05-31

## 2024-07-17 ENCOUNTER — OFFICE VISIT (OUTPATIENT)
Age: 64
End: 2024-07-17
Payer: COMMERCIAL

## 2024-07-17 VITALS
HEIGHT: 69 IN | WEIGHT: 222 LBS | BODY MASS INDEX: 32.88 KG/M2 | SYSTOLIC BLOOD PRESSURE: 126 MMHG | DIASTOLIC BLOOD PRESSURE: 76 MMHG | OXYGEN SATURATION: 99 % | HEART RATE: 82 BPM

## 2024-07-17 DIAGNOSIS — I10 ESSENTIAL HYPERTENSION: Primary | ICD-10-CM

## 2024-07-17 DIAGNOSIS — I25.10 CORONARY ARTERY DISEASE INVOLVING NATIVE CORONARY ARTERY OF NATIVE HEART WITHOUT ANGINA PECTORIS: ICD-10-CM

## 2024-07-17 DIAGNOSIS — I50.22 CHRONIC SYSTOLIC CONGESTIVE HEART FAILURE (HCC): ICD-10-CM

## 2024-07-17 PROCEDURE — 1123F ACP DISCUSS/DSCN MKR DOCD: CPT | Performed by: INTERNAL MEDICINE

## 2024-07-17 PROCEDURE — 3074F SYST BP LT 130 MM HG: CPT | Performed by: INTERNAL MEDICINE

## 2024-07-17 PROCEDURE — 99214 OFFICE O/P EST MOD 30 MIN: CPT | Performed by: INTERNAL MEDICINE

## 2024-07-17 PROCEDURE — 3078F DIAST BP <80 MM HG: CPT | Performed by: INTERNAL MEDICINE

## 2024-07-17 RX ORDER — LOSARTAN POTASSIUM 100 MG/1
100 TABLET ORAL DAILY
Qty: 90 TABLET | Refills: 1 | Status: SHIPPED | OUTPATIENT
Start: 2024-07-17

## 2024-07-17 RX ORDER — ASPIRIN 81 MG/1
81 TABLET, CHEWABLE ORAL DAILY
Qty: 90 TABLET | Refills: 3 | Status: SHIPPED | OUTPATIENT
Start: 2024-07-17

## 2024-07-17 ASSESSMENT — PATIENT HEALTH QUESTIONNAIRE - PHQ9
SUM OF ALL RESPONSES TO PHQ QUESTIONS 1-9: 0
SUM OF ALL RESPONSES TO PHQ QUESTIONS 1-9: 0
1. LITTLE INTEREST OR PLEASURE IN DOING THINGS: NOT AT ALL
2. FEELING DOWN, DEPRESSED OR HOPELESS: NOT AT ALL
SUM OF ALL RESPONSES TO PHQ QUESTIONS 1-9: 0
SUM OF ALL RESPONSES TO PHQ9 QUESTIONS 1 & 2: 0
SUM OF ALL RESPONSES TO PHQ QUESTIONS 1-9: 0

## 2024-07-17 NOTE — PROGRESS NOTES
deficits/abdominal pain.All other systems negative except as above.      Vitals:    07/17/24 1454   BP: 126/76   Pulse: 82   SpO2: 99%       General:    Alert, cooperative, no distress.   Psychiatric:    Normal Mood and affect    Eye/ENT:      Pupils equal, No asymmetry, Conjunctival pink. Able to hear voice at normal amplitude   Lungs:      Visibly symmetric chest expansion, No palpable tenderness. Clear to auscultation bilaterally.    Heart::    Regular rate and rhythm, S1, S2 normal, no murmur, click, rub or gallop.No JVD, Normal palpable peripheral pulses. No cyanosis   Abdomen:     Soft, non-tender. Bowel sounds normal. No masses,  No      organomegaly.   Extremities:   Extremities normal, atraumatic, no edema.   Neurologic:   CN II-XII grossly intact. No gross focal deficits            ATTENTION:    This medical record was transcribed using an electronic medical records/speech recognition system.  Although proofread, it may and can contain electronic, spelling and other errors.  Corrections may be executed at a later time.  Please feel free to  contact us for any clarifications as needed.      [facility]   [facility], [place], [place]. [phone]

## 2024-08-19 NOTE — PROGRESS NOTES
Smoking status: Never     Passive exposure: Never    Smokeless tobacco: Never   Vaping Use    Vaping status: Never Used   Substance Use Topics    Alcohol use: No    Drug use: No       Family History   Problem Relation Age of Onset    Heart Disease Father 74        MI x 2     Diabetes Mother            OBJECTIVE:    Physical Exam    Vitals:   Vitals:    08/26/24 1254   BP: 130/80   Site: Left Upper Arm   Position: Sitting   Cuff Size: Medium Adult   Pulse: 94   Resp: 18   SpO2: 96%   Weight: 100.7 kg (222 lb)   Height: 1.753 m (5' 9\")       General:    Alert, cooperative, no distress, appears stated age.   Neck:   Supple, no JVD.   Back:     Symmetric.   Lungs:     Clear to auscultation bilaterally.   Heart:    Regular rate and rhythm.  No murmur, click, rub or gallop.   Abdomen:     Soft, non-tender. Bowel sounds normal.    MSK:   Extremities normal, atraumatic.  Moves extremities independently.   Vasc/lymph:   No lower extremity edema.   Skin:   Skin color normal. No rashes or lesions on visible areas.  Left chest ICD site well healed.   Neurologic:   Alert, moves all extremities.        Data Review:     Radiology:   XR Results (most recent):    CT Result (most recent):  CT ABDOMEN PELVIS W IV CONTRAST 12/05/2017    Narrative  INDICATION: Left lower quadrant pain, generalized abdominal pain, epigastric  pain.    CT of the abdomen and pelvis is performed with 5 mm collimation. Study is  performed with PO and 100 cc of nonionic Isovue 370. Sagittal and coronal  reformatted images were also performed.    CT dose reduction was achieved with the use of the standardized protocol  tailored for this examination and automatic exposure control for dose  modulation.    There is no prior study for direct comparison.    Findings:    Lung bases: The visualized lung bases are clear.    Liver: The liver is normal.    Adrenals: Adrenal glands are normal.    Pancreas: The pancreas is normal.    Gallbladder: The gallbladder is  APRN - NP  [facility]  [address], [address]  [place], [place] [address] [phone]      CC:Alayna De La Garza MD

## 2024-08-26 ENCOUNTER — OFFICE VISIT (OUTPATIENT)
Age: 64
End: 2024-08-26
Payer: COMMERCIAL

## 2024-08-26 ENCOUNTER — PROCEDURE VISIT (OUTPATIENT)
Age: 64
End: 2024-08-26

## 2024-08-26 VITALS
HEART RATE: 94 BPM | OXYGEN SATURATION: 96 % | HEIGHT: 69 IN | DIASTOLIC BLOOD PRESSURE: 80 MMHG | WEIGHT: 222 LBS | BODY MASS INDEX: 32.88 KG/M2 | RESPIRATION RATE: 18 BRPM | SYSTOLIC BLOOD PRESSURE: 130 MMHG

## 2024-08-26 DIAGNOSIS — I25.10 CORONARY ARTERY DISEASE INVOLVING NATIVE CORONARY ARTERY OF NATIVE HEART WITHOUT ANGINA PECTORIS: ICD-10-CM

## 2024-08-26 DIAGNOSIS — Z95.810 PRESENCE OF BIVENTRICULAR AUTOMATIC CARDIOVERTER/DEFIBRILLATOR (AICD): Primary | ICD-10-CM

## 2024-08-26 DIAGNOSIS — Z95.810 BIVENTRICULAR IMPLANTABLE CARDIOVERTER-DEFIBRILLATOR (ICD) IN SITU: Primary | ICD-10-CM

## 2024-08-26 DIAGNOSIS — Z95.5 HISTORY OF CORONARY ARTERY STENT PLACEMENT: ICD-10-CM

## 2024-08-26 DIAGNOSIS — I10 PRIMARY HYPERTENSION: ICD-10-CM

## 2024-08-26 DIAGNOSIS — I42.8 OTHER CARDIOMYOPATHY (HCC): ICD-10-CM

## 2024-08-26 DIAGNOSIS — I50.22 CHRONIC SYSTOLIC CHF (CONGESTIVE HEART FAILURE), NYHA CLASS 1 (HCC): ICD-10-CM

## 2024-08-26 PROCEDURE — 1123F ACP DISCUSS/DSCN MKR DOCD: CPT | Performed by: NURSE PRACTITIONER

## 2024-08-26 PROCEDURE — 99214 OFFICE O/P EST MOD 30 MIN: CPT | Performed by: NURSE PRACTITIONER

## 2024-08-26 PROCEDURE — 3075F SYST BP GE 130 - 139MM HG: CPT | Performed by: NURSE PRACTITIONER

## 2024-08-26 PROCEDURE — 93000 ELECTROCARDIOGRAM COMPLETE: CPT | Performed by: NURSE PRACTITIONER

## 2024-08-26 PROCEDURE — 3079F DIAST BP 80-89 MM HG: CPT | Performed by: NURSE PRACTITIONER

## 2024-10-13 PROCEDURE — 93297 REM INTERROG DEV EVAL ICPMS: CPT | Performed by: INTERNAL MEDICINE

## 2024-11-05 ENCOUNTER — TELEPHONE (OUTPATIENT)
Facility: CLINIC | Age: 64
End: 2024-11-05

## 2024-11-06 ENCOUNTER — ENROLLMENT (OUTPATIENT)
Dept: PHARMACY | Facility: CLINIC | Age: 64
End: 2024-11-06

## 2024-11-06 ENCOUNTER — TELEPHONE (OUTPATIENT)
Dept: PHARMACY | Facility: CLINIC | Age: 64
End: 2024-11-06

## 2024-11-06 NOTE — TELEPHONE ENCOUNTER
TARAS DE LA GARZA MD     Carlos Saldana has an appointment with you 11/7/2024 and has been identified with a care gap for Statin Use in Person With Diabetes (SUPD).    Per patient chart review: Was previously excluded from the statins with diabetes care gap in the previous calendar year with dx code Myalgia (M79.10). Unfortunately, Myalgia (M79.10) is no longer accepted by [facility] to exclude patients from the SUPD Statin Gap.     If acceptable, please change to MYOPATHY (G72.0) as a visit dx code for 11/7/2024 office visit. Can you please addend OV 11/7/2024 and add as a Visit Diagnosis code MYOPATHY (G72.0, G72.89, G72.9), if appropriate?     Please let me know if you have any questions!    Thank you,  Meli Spain, PharmD, [facility]  Population Health Pharmacist  [facility] [place] [facility]   Department, toll free: [phone] Option 1   ===============================================================================  Thank you, TARAS DE LA GARZA MD!    EXC- 11/07/2024 Myopathy: (G72.0, G72.89, G72.9) entered by Taras De La Garza     For Pharmacy Admin Tracking Only    Program: [facility] [employer](INOR)  CPA in place:  No  Recommendation Provided To: Provider: 1 via Note to Provider  Intervention Detail: New Rx: 0, reason: Needs Additional Therapy  Intervention Accepted By: Provider: 1  Gap Closed?: Yes   Time Spent (min): 20

## 2024-11-07 ENCOUNTER — OFFICE VISIT (OUTPATIENT)
Facility: CLINIC | Age: 64
End: 2024-11-07
Payer: MEDICARE

## 2024-11-07 VITALS
RESPIRATION RATE: 12 BRPM | OXYGEN SATURATION: 96 % | DIASTOLIC BLOOD PRESSURE: 87 MMHG | WEIGHT: 226 LBS | BODY MASS INDEX: 33.47 KG/M2 | HEIGHT: 69 IN | SYSTOLIC BLOOD PRESSURE: 127 MMHG | HEART RATE: 97 BPM | TEMPERATURE: 98.1 F

## 2024-11-07 DIAGNOSIS — E11.8 TYPE 2 DIABETES MELLITUS WITH COMPLICATION, WITHOUT LONG-TERM CURRENT USE OF INSULIN (HCC): Primary | ICD-10-CM

## 2024-11-07 DIAGNOSIS — Z12.11 COLON CANCER SCREENING: ICD-10-CM

## 2024-11-07 DIAGNOSIS — T46.6X5A STATIN MYOPATHY: ICD-10-CM

## 2024-11-07 DIAGNOSIS — G72.0 STATIN MYOPATHY: ICD-10-CM

## 2024-11-07 DIAGNOSIS — I50.22 CHRONIC SYSTOLIC CONGESTIVE HEART FAILURE (HCC): ICD-10-CM

## 2024-11-07 DIAGNOSIS — I10 ESSENTIAL HYPERTENSION: ICD-10-CM

## 2024-11-07 DIAGNOSIS — E78.2 MIXED HYPERLIPIDEMIA: ICD-10-CM

## 2024-11-07 DIAGNOSIS — Z12.5 PROSTATE CANCER SCREENING: ICD-10-CM

## 2024-11-07 LAB
ALBUMIN SERPL-MCNC: 4 G/DL (ref 3.5–5)
ALBUMIN/GLOB SERPL: 1.1 (ref 1.1–2.2)
ALP SERPL-CCNC: 110 U/L (ref 45–117)
ALT SERPL-CCNC: 34 U/L (ref 12–78)
ANION GAP SERPL CALC-SCNC: 4 MMOL/L (ref 2–12)
AST SERPL-CCNC: 16 U/L (ref 15–37)
BILIRUB SERPL-MCNC: 0.4 MG/DL (ref 0.2–1)
BUN SERPL-MCNC: 14 MG/DL (ref 6–20)
BUN/CREAT SERPL: 12 (ref 12–20)
CALCIUM SERPL-MCNC: 9.7 MG/DL (ref 8.5–10.1)
CHLORIDE SERPL-SCNC: 105 MMOL/L (ref 97–108)
CHOLEST SERPL-MCNC: 234 MG/DL
CO2 SERPL-SCNC: 28 MMOL/L (ref 21–32)
CREAT SERPL-MCNC: 1.2 MG/DL (ref 0.7–1.3)
CREAT UR-MCNC: 164 MG/DL
GLOBULIN SER CALC-MCNC: 3.5 G/DL (ref 2–4)
GLUCOSE SERPL-MCNC: 146 MG/DL (ref 65–100)
HBA1C MFR BLD: 8.1 %
HDLC SERPL-MCNC: 40 MG/DL
HDLC SERPL: 5.9 (ref 0–5)
LDLC SERPL CALC-MCNC: 155.8 MG/DL (ref 0–100)
MICROALBUMIN UR-MCNC: 0.61 MG/DL
MICROALBUMIN/CREAT UR-RTO: 4 MG/G (ref 0–30)
POTASSIUM SERPL-SCNC: 5 MMOL/L (ref 3.5–5.1)
PROT SERPL-MCNC: 7.5 G/DL (ref 6.4–8.2)
PSA SERPL-MCNC: 0.9 NG/ML (ref 0.01–4)
SODIUM SERPL-SCNC: 137 MMOL/L (ref 136–145)
SPECIMEN HOLD: NORMAL
TRIGL SERPL-MCNC: 191 MG/DL
VLDLC SERPL CALC-MCNC: 38.2 MG/DL

## 2024-11-07 PROCEDURE — 3017F COLORECTAL CA SCREEN DOC REV: CPT | Performed by: INTERNAL MEDICINE

## 2024-11-07 PROCEDURE — 3079F DIAST BP 80-89 MM HG: CPT | Performed by: INTERNAL MEDICINE

## 2024-11-07 PROCEDURE — G8427 DOCREV CUR MEDS BY ELIG CLIN: HCPCS | Performed by: INTERNAL MEDICINE

## 2024-11-07 PROCEDURE — 3074F SYST BP LT 130 MM HG: CPT | Performed by: INTERNAL MEDICINE

## 2024-11-07 PROCEDURE — G8484 FLU IMMUNIZE NO ADMIN: HCPCS | Performed by: INTERNAL MEDICINE

## 2024-11-07 PROCEDURE — 2022F DILAT RTA XM EVC RTNOPTHY: CPT | Performed by: INTERNAL MEDICINE

## 2024-11-07 PROCEDURE — 83036 HEMOGLOBIN GLYCOSYLATED A1C: CPT | Performed by: INTERNAL MEDICINE

## 2024-11-07 PROCEDURE — G8417 CALC BMI ABV UP PARAM F/U: HCPCS | Performed by: INTERNAL MEDICINE

## 2024-11-07 PROCEDURE — 1123F ACP DISCUSS/DSCN MKR DOCD: CPT | Performed by: INTERNAL MEDICINE

## 2024-11-07 PROCEDURE — 3044F HG A1C LEVEL LT 7.0%: CPT | Performed by: INTERNAL MEDICINE

## 2024-11-07 PROCEDURE — 99214 OFFICE O/P EST MOD 30 MIN: CPT | Performed by: INTERNAL MEDICINE

## 2024-11-07 PROCEDURE — 1036F TOBACCO NON-USER: CPT | Performed by: INTERNAL MEDICINE

## 2024-11-07 SDOH — ECONOMIC STABILITY: INCOME INSECURITY: HOW HARD IS IT FOR YOU TO PAY FOR THE VERY BASICS LIKE FOOD, HOUSING, MEDICAL CARE, AND HEATING?: NOT HARD AT ALL

## 2024-11-07 SDOH — ECONOMIC STABILITY: FOOD INSECURITY: WITHIN THE PAST 12 MONTHS, THE FOOD YOU BOUGHT JUST DIDN'T LAST AND YOU DIDN'T HAVE MONEY TO GET MORE.: NEVER TRUE

## 2024-11-07 SDOH — ECONOMIC STABILITY: FOOD INSECURITY: WITHIN THE PAST 12 MONTHS, YOU WORRIED THAT YOUR FOOD WOULD RUN OUT BEFORE YOU GOT MONEY TO BUY MORE.: NEVER TRUE

## 2024-11-07 NOTE — PATIENT INSTRUCTIONS
A1C was 6.7 in May, now 8.1 - goal is less than 7    Check blood sugar once to twice daily and bring values to your next visit.  Check some first thing in the morning, some before lunch, some before dinner, and some at bedtime.     Check some 2 hours after eating as well.     Goal is  before a meal and no higher than 150 2 hours after a meal

## 2024-11-07 NOTE — PROGRESS NOTES
Carlos Saldana  Identified pt with two pt identifiers(name and ).     Chief Complaint   Patient presents with    Diabetes    Hypertension    Cholesterol Problem       Reviewed record In preparation for visit and have obtained necessary documentation.     1. Have you been to the ER, urgent care clinic or hospitalized since your last visit? No     2. Have you seen or consulted any other health care providers outside of the [facility] since your last visit? Include any pap smears or colon screening. No    Vitals reviewed with provider.    Health Maintenance reviewed:     Health Maintenance Due   Topic    Colorectal Cancer Screen     DTaP/Tdap/Td vaccine (1 - Tdap)    Respiratory Syncytial Virus (RSV) Pregnant or age 60 yrs+ (1 - 1-dose 60+ series)    Pneumococcal 65+ years Vaccine (2 of 2 - PCV)    Diabetic retinal exam     Annual Wellness Visit (Medicare Advantage)     Flu vaccine (1)    COVID-19 Vaccine ( -  season)    Diabetic Alb to Cr ratio (uACR) test           Wt Readings from Last 3 Encounters:   24 100.7 kg (222 lb)   24 100.7 kg (222 lb)   24 101.6 kg (224 lb)        Temp Readings from Last 3 Encounters:   No data found for Temp        BP Readings from Last 3 Encounters:   24 130/80   24 126/76   24 124/86        Pulse Readings from Last 3 Encounters:   24 94   24 82   24 76      [unfilled]       Learning Assessment:   :         2023     8:30 AM   [facility] AMB LEARNING ASSESSMENT   Primary Learner Patient   level of education [employer]   Barriers Factors NONE   co-learner caregiver No   Primary Language ENGLISH   Learning Preference DEMONSTRATION   Answered By PATIENT   Relationship to Learner SELF         Fall Risk Assessment:   :         2024     2:53 PM   Amb Fall Risk Assessment and TUG Test   Do you feel unsteady or are you worried about falling?  no   2 or more falls in past year? no   Fall with injury in past year? no 
Screening    Diabetes is poorly controlled.  He agrees to change his diet to decrease sugar.  He cannot tolerate a higher dose of metformin due to GI side effects.  I offered a medication such as Jardiance and he declines, stating he wants to work on diet changes first.  BP at goal, continue losartan amlodipine.  Lipids have not been to goal, cannot tolerate statin due to myopathy.  Check today and restart Zetia.  CHF without signs or symptoms of exacerbation, EF 55 to 60% in 2023.  Managed by cardiology.      Health Maintenance Due   Topic Date Due    Colorectal Cancer Screen  Never done    DTaP/Tdap/Td vaccine (1 - Tdap) 08/29/2005    Diabetic retinal exam  01/12/2023    Annual Wellness Visit (Medicare Advantage)  Never done    Diabetic Alb to Cr ratio (uACR) test  11/02/2024        Follow-up and Dispositions    Return in about 6 months (around 5/7/2025) for DIABETES.            Reviewed plan of care. Patient has provided input and agrees with goals.     The nurse provided the patient and/or family with advanced directive information if needed and encouraged the patient to provide a copy to the office when available.

## 2024-11-08 NOTE — TELEPHONE ENCOUNTER
PCP: Alayna De La Garza MD     Last appt: 11/7/2024    Future Appointments   Date Time Provider Department Center   2/25/2025  1:20 PM JONELLE MORAES [facility] AMB   2/25/2025  1:40 PM Belen Rolon MD [facility] AM[facility]   5/7/2025  9:10 AM Alayna De La Garza MD [facility]   7/16/2025  9:40 AM Ron Dumont MD [facility] AMB          Requested Prescriptions     Pending Prescriptions Disp Refills    metFORMIN (GLUCOPHAGE-XR) 500 MG extended release tablet [Pharmacy Med Name: METFORMIN HCL  MG TABLET] 180 tablet 2     Sig: TAKE 1 TABLET BY MOUTH TWICE A DAY WITH MEALS

## 2024-11-09 LAB — TSH SERPL DL<=0.05 MIU/L-ACNC: 3 UIU/ML (ref 0.45–4.5)

## 2024-11-11 RX ORDER — METFORMIN HYDROCHLORIDE 500 MG/1
500 TABLET, EXTENDED RELEASE ORAL 2 TIMES DAILY WITH MEALS
Qty: 180 TABLET | Refills: 2 | Status: SHIPPED | OUTPATIENT
Start: 2024-11-11

## 2025-01-11 DIAGNOSIS — I10 ESSENTIAL HYPERTENSION: ICD-10-CM

## 2025-01-13 RX ORDER — LOSARTAN POTASSIUM 100 MG/1
100 TABLET ORAL DAILY
Qty: 90 TABLET | Refills: 1 | Status: SHIPPED | OUTPATIENT
Start: 2025-01-13

## 2025-01-13 NOTE — TELEPHONE ENCOUNTER
Requested Prescriptions     Signed Prescriptions Disp Refills    losartan (COZAAR) 100 MG tablet 90 tablet 1     Sig: TAKE 1 TABLET BY MOUTH EVERY DAY     Authorizing Provider: JACK DUMONT     Ordering User: ESAU JOHNSON per MD    Future Appointments   Date Time Provider Department Center   2/25/2025  1:20 PM JONELLE MORAES BS AMB   2/25/2025  1:40 PM Belen Rolon MD [facility] AM[facility]   5/7/2025  9:10 AM Alayna De La Garza MD [facility]   7/16/2025  9:40 AM Jack Dumont MD [facility] AM[facility]

## 2025-02-15 DIAGNOSIS — I10 ESSENTIAL HYPERTENSION: ICD-10-CM

## 2025-02-17 RX ORDER — AMLODIPINE BESYLATE 5 MG/1
5 TABLET ORAL DAILY
Qty: 90 TABLET | Refills: 2 | Status: SHIPPED | OUTPATIENT
Start: 2025-02-17

## 2025-02-17 NOTE — TELEPHONE ENCOUNTER
Requested Prescriptions     Signed Prescriptions Disp Refills    amLODIPine (NORVASC) 5 MG tablet 90 tablet 2     Sig: TAKE 1 TABLET BY MOUTH EVERY DAY     Authorizing Provider: JACK DUMONT     Ordering User: ESAU JOHNSON per MD    Future Appointments   Date Time Provider Department Center   2/25/2025  1:20 PM JONELLE MORAES BS AMB   2/25/2025  1:40 PM Belen Rolon MD [facility]   5/7/2025  9:10 AM Alayna De La Garza MD [facility]   7/16/2025  9:40 AM Jack Dumont MD [facility] AM[facility]

## 2025-02-24 NOTE — PROGRESS NOTES
Cardiac Electrophysiology OFFICE Consultation Note       Assessment/Plan:   1. Chronic systolic congestive heart failure (HCC)  -     EKG 12 Lead  2. Cardiomyopathy, unspecified type (HCC)  -     EKG 12 Lead  3. AICD (automatic cardioverter/defibrillator) present  -     EKG 12 Lead  4. Coronary artery disease involving native coronary artery of native heart without angina pectoris  -     EKG 12 Lead  5. Essential hypertension  -     EKG 12 Lead  6. Pacemaker  -     EKG 12 Lead  7. Type 2 diabetes mellitus with complication, without long-term current use of insulin (HCC)  -     EKG 12 Lead  8. Obstructive sleep apnea treated with BiPAP  -     EKG 12 Lead         Primary Cardiologist: Raffi     Chronic systolic CHF/[employer] biventricular ICD (DOI 07/29/2021):  -Implanted at [facility].  -Device check today shows proper lead & generator function.  Generator longevity estimated 4 years. RA <1%, CRT 97%.  No events noted. Heart logic 6 today.  - euvolemic on exam  - heart logic 0  -Continue remote checks q 3 months.  -Follow up with me in 1 year.     Mixed pattern CM:  -Noted post MI, now with normalized LVEF per echo in 06/2023.  -NYHA I.  -Cough noted with Entresto.  -Continue Losartan     CAD:  -S/p PCI LAD & RCA, last in 11/2021 at [facility].  -No angina.  -Continue rosuvastatin & ASA as previously prescribed.     HTN:   -BP controlled.  -Continue losartan and amlodipine     DM2:  -Hgb A1c 6.5 in 05/2023.  -Followed by PCP.  -Continue metformin.        Patient is an established/new patient with plan for longitudinal relationship and ongoing assessment and management of arrhythmias recurrence, monitor, labs, remote transmission, device management as a focal point of continued medical care.   - FU in EP in 1 year with Q3 month remotes          Subjective:       Carlos Saldana is a 63 y.o. patient who presents to [facility] care, is s/p [employer] biventricular ICD (DOI 07/29/2021).     He reports

## 2025-02-25 ENCOUNTER — OFFICE VISIT (OUTPATIENT)
Age: 65
End: 2025-02-25
Payer: MEDICARE

## 2025-02-25 ENCOUNTER — PROCEDURE VISIT (OUTPATIENT)
Age: 65
End: 2025-02-25
Payer: MEDICARE

## 2025-02-25 VITALS
BODY MASS INDEX: 33.47 KG/M2 | OXYGEN SATURATION: 97 % | WEIGHT: 226 LBS | SYSTOLIC BLOOD PRESSURE: 120 MMHG | HEART RATE: 86 BPM | DIASTOLIC BLOOD PRESSURE: 89 MMHG | HEIGHT: 69 IN

## 2025-02-25 DIAGNOSIS — I42.9 CARDIOMYOPATHY, UNSPECIFIED TYPE (HCC): ICD-10-CM

## 2025-02-25 DIAGNOSIS — Z95.810 AICD (AUTOMATIC CARDIOVERTER/DEFIBRILLATOR) PRESENT: ICD-10-CM

## 2025-02-25 DIAGNOSIS — I10 ESSENTIAL HYPERTENSION: ICD-10-CM

## 2025-02-25 DIAGNOSIS — Z95.810 BIVENTRICULAR IMPLANTABLE CARDIOVERTER-DEFIBRILLATOR (ICD) IN SITU: Primary | ICD-10-CM

## 2025-02-25 DIAGNOSIS — I50.22 CHRONIC SYSTOLIC CONGESTIVE HEART FAILURE (HCC): Primary | ICD-10-CM

## 2025-02-25 DIAGNOSIS — Z95.0 PACEMAKER: ICD-10-CM

## 2025-02-25 DIAGNOSIS — G47.33 OBSTRUCTIVE SLEEP APNEA TREATED WITH BIPAP: ICD-10-CM

## 2025-02-25 DIAGNOSIS — E11.8 TYPE 2 DIABETES MELLITUS WITH COMPLICATION, WITHOUT LONG-TERM CURRENT USE OF INSULIN (HCC): ICD-10-CM

## 2025-02-25 DIAGNOSIS — I25.10 CORONARY ARTERY DISEASE INVOLVING NATIVE CORONARY ARTERY OF NATIVE HEART WITHOUT ANGINA PECTORIS: ICD-10-CM

## 2025-02-25 PROCEDURE — 93290 INTERROG DEV EVAL ICPMS IP: CPT | Performed by: INTERNAL MEDICINE

## 2025-02-25 PROCEDURE — 99214 OFFICE O/P EST MOD 30 MIN: CPT | Performed by: INTERNAL MEDICINE

## 2025-02-25 PROCEDURE — 93283 PRGRMG EVAL IMPLANTABLE DFB: CPT | Performed by: INTERNAL MEDICINE

## 2025-02-25 PROCEDURE — 93005 ELECTROCARDIOGRAM TRACING: CPT | Performed by: INTERNAL MEDICINE

## 2025-02-25 ASSESSMENT — PATIENT HEALTH QUESTIONNAIRE - PHQ9
SUM OF ALL RESPONSES TO PHQ QUESTIONS 1-9: 0
1. LITTLE INTEREST OR PLEASURE IN DOING THINGS: NOT AT ALL
SUM OF ALL RESPONSES TO PHQ9 QUESTIONS 1 & 2: 0
2. FEELING DOWN, DEPRESSED OR HOPELESS: NOT AT ALL
SUM OF ALL RESPONSES TO PHQ QUESTIONS 1-9: 0

## 2025-02-25 NOTE — PATIENT INSTRUCTIONS
Continue remote transmission every 3 months  FU with NP in 1 year  FU with Dr. Rolon in 2 years

## 2025-02-28 NOTE — PROGRESS NOTES
HPI  Mr. Maurisio Francois is a 63y.o. year old male, he is seen today for follow up HTN, high cholesterol, DM. Has been seeing orthopedist for his back, had films showing arthritis. Was taking voltaren but cardiologist told him to stop. Has been going to PT now. No chest pain, sob, dizziness, weakness, lightheadedness. Not checking glucose much, no issues with low glucose. Chief Complaint   Patient presents with    Blood Pressure Check     Room 2C //     Cholesterol Problem    Diabetes    Immunization/Injection        Prior to Admission medications    Medication Sig Start Date End Date Taking? Authorizing Provider   amLODIPine (NORVASC) 5 mg tablet TAKE 1 TABLET BY MOUTH EVERY DAY 12/1/20  Yes Faiza Briseno MD   metFORMIN (GLUCOPHAGE) 500 mg tablet TAKE 1 TABLET BY MOUTH TWICE A DAY WITH MEALS 12/1/20  Yes Faiza Briseno MD   losartan (COZAAR) 25 mg tablet TAKE 1 TABLET BY MOUTH EVERY DAY 12/1/20  Yes Faiza Briseno MD   alfuzosin SR (UROXATRAL) 10 mg SR tablet  7/27/20  Yes Provider, Historical   cpap machine kit by Does Not Apply route. Yes Provider, Historical   atorvastatin (LIPITOR) 80 mg tablet  3/4/19  Yes Provider, Historical   aspirin 81 mg chewable tablet Take 81 mg by mouth daily. Yes Provider, Historical         No Known Allergies      REVIEW OF SYSTEMS:  Per HPI    PHYSICAL EXAM:  Visit Vitals  /72 (BP 1 Location: Right arm, BP Patient Position: Sitting)   Pulse 85   Temp 98.2 °F (36.8 °C) (Oral)   Resp 16   Ht 5' 9\" (1.753 m)   Wt 212 lb 6.4 oz (96.3 kg)   SpO2 93%   BMI 31.37 kg/m²     Constitutional: Appears well-developed and well-nourished. No distress. HENT:   Head: Normocephalic and atraumatic. Eyes: No scleral icterus. Neck: no lad, no tm, supple   Cardiovascular: Normal S1/S2, regular rhythm. No murmurs, rubs, or gallops. Pulmonary/Chest: Effort normal and breath sounds normal. No respiratory distress. No wheezes, rhonchi, or rales.    Abdomen: Soft, NT/ND, +BS, no rebound or guarding, no masses, no HSM appreciated. Ext: No edema. Neurological: Alert. Psychiatric: Normal mood and affect. Behavior is normal.     Lab Results   Component Value Date/Time    Sodium 142 02/04/2020 04:06 PM    Potassium 4.2 02/04/2020 04:06 PM    Chloride 101 02/04/2020 04:06 PM    CO2 23 02/04/2020 04:06 PM    Glucose 101 (H) 02/04/2020 04:06 PM    BUN 16 02/04/2020 04:06 PM    Creatinine 0.90 02/04/2020 04:06 PM    BUN/Creatinine ratio 18 02/04/2020 04:06 PM    GFR est  02/04/2020 04:06 PM    GFR est non-AA 93 02/04/2020 04:06 PM    Calcium 9.2 02/04/2020 04:06 PM    Bilirubin, total 0.5 02/04/2020 04:06 PM    Alk. phosphatase 149 (H) 02/04/2020 04:06 PM    Protein, total 7.1 02/04/2020 04:06 PM    Albumin 4.7 02/04/2020 04:06 PM    A-G Ratio 2.0 02/04/2020 04:06 PM    ALT (SGPT) 29 02/04/2020 04:06 PM     Lab Results   Component Value Date/Time    Hemoglobin A1c 7.1 (H) 02/04/2020 04:06 PM    Hemoglobin A1c 7.1 (H) 11/06/2018 09:47 AM    Hemoglobin A1c 7.0 (H) 05/03/2018 10:03 AM    Hemoglobin A1c, External 6.6 11/20/2020      Lab Results   Component Value Date/Time    Cholesterol, total 109 02/04/2020 04:06 PM    HDL Cholesterol 37 (L) 02/04/2020 04:06 PM    LDL, calculated 52 02/04/2020 04:06 PM    VLDL, calculated 20 02/04/2020 04:06 PM    Triglyceride 98 02/04/2020 04:06 PM          ASSESSMENT/PLAN  Diagnoses and all orders for this visit:    1. Type 2 diabetes mellitus with complication, without long-term current use of insulin (HCC)  -     METABOLIC PANEL, COMPREHENSIVE; Future  -     HEMOGLOBIN A1C WITH EAG; Future  Recent a1c acceptable - scanned - continue current medications   2. Obstructive sleep apnea treated with BiPAP  Followed by pulmonary  3. Essential hypertension  Controlled on current regimen, continue   4. Coronary artery disease involving native coronary artery of native heart without angina pectoris  Followed by cardiology   5.  Mixed hyperlipidemia  -     LIPID PANEL; Future    6. Encounter for immunization  -     PNEUMOCOCCAL POLYSACCHARIDE VACCINE, 23-VALENT, ADULT OR IMMUNOSUPPRESSED PT DOSE,  -     NE IMMUNIZ ADMIN,1 SINGLE/COMB VAC/TOXOID          Health Maintenance Due   Topic Date Due    DTaP/Tdap/Td series (1 - Tdap) 07/16/1980    Colorectal Cancer Screening Combo  07/16/2009    Foot Exam Q1  07/30/2019        Follow-up and Dispositions    · Return in about 6 months (around 6/17/2021) for dm. Reviewed plan of care. Patient has provided input and agrees with goals. The nurse provided the patient and/or family with advanced directive information if needed and encouraged the patient to provide a copy to the office when available. General Sunscreen Counseling: photoprotection and sunscreen Detail Level: Detailed General Sunscreen Counseling: Sunscreen (SPF 30+ or higher) and photoprotection advised Detail Level: Zone

## 2025-03-01 PROCEDURE — 93297 REM INTERROG DEV EVAL ICPMS: CPT | Performed by: INTERNAL MEDICINE

## 2025-03-04 ENCOUNTER — OFFICE VISIT (OUTPATIENT)
Facility: CLINIC | Age: 65
End: 2025-03-04

## 2025-03-04 VITALS
HEART RATE: 94 BPM | WEIGHT: 228 LBS | RESPIRATION RATE: 12 BRPM | SYSTOLIC BLOOD PRESSURE: 119 MMHG | TEMPERATURE: 98.3 F | OXYGEN SATURATION: 95 % | DIASTOLIC BLOOD PRESSURE: 75 MMHG | HEIGHT: 69 IN | BODY MASS INDEX: 33.77 KG/M2

## 2025-03-04 DIAGNOSIS — H61.23 BILATERAL IMPACTED CERUMEN: Primary | ICD-10-CM

## 2025-03-04 RX ORDER — SILDENAFIL CITRATE 20 MG/1
TABLET ORAL
COMMUNITY
Start: 2025-02-19

## 2025-03-04 SDOH — ECONOMIC STABILITY: FOOD INSECURITY: WITHIN THE PAST 12 MONTHS, THE FOOD YOU BOUGHT JUST DIDN'T LAST AND YOU DIDN'T HAVE MONEY TO GET MORE.: NEVER TRUE

## 2025-03-04 SDOH — ECONOMIC STABILITY: FOOD INSECURITY: WITHIN THE PAST 12 MONTHS, YOU WORRIED THAT YOUR FOOD WOULD RUN OUT BEFORE YOU GOT MONEY TO BUY MORE.: NEVER TRUE

## 2025-03-04 NOTE — PROGRESS NOTES
Carlos Saldana  Identified pt with two pt identifiers(name and ).     Chief Complaint   Patient presents with    ears clogged       Reviewed record In preparation for visit and have obtained necessary documentation.     1. Have you been to the ER, urgent care clinic or hospitalized since your last visit? No     2. Have you seen or consulted any other health care providers outside of the [facility] since your last visit? Include any pap smears or colon screening. No    Vitals reviewed with provider.    Health Maintenance reviewed:     Health Maintenance Due   Topic    Colorectal Cancer Screen     DTaP/Tdap/Td vaccine (1 - Tdap)    Annual Wellness Visit (Medicare Advantage)           Wt Readings from Last 3 Encounters:   25 103.4 kg (228 lb)   25 102.5 kg (226 lb)   24 102.5 kg (226 lb)        Temp Readings from Last 3 Encounters:   25 98.3 °F (36.8 °C) (Oral)   24 98.1 °F (36.7 °C) (Oral)        BP Readings from Last 3 Encounters:   25 119/75   25 120/89   24 127/87        Pulse Readings from Last 3 Encounters:   25 94   25 86   24 97      [unfilled]       Learning Assessment:   :         2023     8:30 AM   [facility] AMB LEARNING ASSESSMENT   Primary Learner Patient   level of education [employer]   Barriers Factors NONE   co-learner caregiver No   Primary Language ENGLISH   Learning Preference DEMONSTRATION   Answered By PATIENT   Relationship to Learner SELF         Fall Risk Assessment:   :         2025     2:11 PM 2024     2:53 PM   Amb Fall Risk Assessment and TUG Test   Do you feel unsteady or are you worried about falling?  no no   2 or more falls in past year? no no   Fall with injury in past year? no no          Abuse Screening:   :         2024     8:00 AM 2023     8:00 AM   AMB Abuse Screening   Do you ever feel afraid of your partner? N N   Are you in a relationship with someone who physically or mentally 
cheeks and top of scalp.    Eyes: No scleral icterus.   Ears: tm's wnl after irrigation  Cardiovascular: Normal S1/S2, regular rhythm.  No murmurs, rubs, or gallops.  Pulmonary/Chest: Effort normal and breath sounds normal. No respiratory distress. No wheezes, rhonchi, or rales.   Ext: No edema.   Neurological: Alert.   Psychiatric: Normal mood and affect. Behavior is normal.     Lab Results   Component Value Date/Time     11/07/2024 09:48 AM    K 5.0 11/07/2024 09:48 AM     11/07/2024 09:48 AM    CO2 28 11/07/2024 09:48 AM    BUN 14 11/07/2024 09:48 AM    GFRAA 107 02/04/2020 04:06 PM    GLOB 3.5 11/07/2024 09:48 AM    ALT 34 11/07/2024 09:48 AM     No results found for: \"HBA1C\"   Lab Results   Component Value Date/Time    CHOL 234 11/07/2024 09:48 AM    HDL 40 11/07/2024 09:48 AM    .8 11/07/2024 09:48 AM     11/02/2023 09:23 AM    VLDL 38.2 11/07/2024 09:48 AM          ASSESSMENT/PLAN  Carlos was seen today for ears clogged.    Diagnoses and all orders for this visit:    Bilateral impacted cerumen  -     REMOVAL IMPACTED CERUMEN IRRIGATION/LVG UNILAT      Tolerated irrigation without incident    Health Maintenance Due   Topic Date Due    Colorectal Cancer Screen  Never done    DTaP/Tdap/Td vaccine (1 - Tdap) 08/29/2005    Annual Wellness Visit (Medicare Advantage)  Never done               Reviewed plan of care. Patient has provided input and agrees with goals.     The nurse provided the patient and/or family with advanced directive information if needed and encouraged the patient to provide a copy to the office when available.

## 2025-04-01 PROCEDURE — 93297 REM INTERROG DEV EVAL ICPMS: CPT | Performed by: INTERNAL MEDICINE

## 2025-05-05 ENCOUNTER — TELEPHONE (OUTPATIENT)
Facility: CLINIC | Age: 65
End: 2025-05-05

## 2025-05-05 SDOH — HEALTH STABILITY: PHYSICAL HEALTH: ON AVERAGE, HOW MANY DAYS PER WEEK DO YOU ENGAGE IN MODERATE TO STRENUOUS EXERCISE (LIKE A BRISK WALK)?: 3 DAYS

## 2025-05-05 ASSESSMENT — LIFESTYLE VARIABLES
HOW OFTEN DO YOU HAVE A DRINK CONTAINING ALCOHOL: 1
HOW OFTEN DO YOU HAVE A DRINK CONTAINING ALCOHOL: NEVER
HOW OFTEN DO YOU HAVE SIX OR MORE DRINKS ON ONE OCCASION: 1
HOW MANY STANDARD DRINKS CONTAINING ALCOHOL DO YOU HAVE ON A TYPICAL DAY: PATIENT DOES NOT DRINK
HOW MANY STANDARD DRINKS CONTAINING ALCOHOL DO YOU HAVE ON A TYPICAL DAY: 0

## 2025-05-05 ASSESSMENT — PATIENT HEALTH QUESTIONNAIRE - PHQ9
SUM OF ALL RESPONSES TO PHQ QUESTIONS 1-9: 0
SUM OF ALL RESPONSES TO PHQ QUESTIONS 1-9: 0
2. FEELING DOWN, DEPRESSED OR HOPELESS: NOT AT ALL
SUM OF ALL RESPONSES TO PHQ QUESTIONS 1-9: 0
1. LITTLE INTEREST OR PLEASURE IN DOING THINGS: NOT AT ALL
SUM OF ALL RESPONSES TO PHQ QUESTIONS 1-9: 0

## 2025-05-07 ENCOUNTER — OFFICE VISIT (OUTPATIENT)
Facility: CLINIC | Age: 65
End: 2025-05-07
Payer: MEDICARE

## 2025-05-07 VITALS
OXYGEN SATURATION: 96 % | HEART RATE: 78 BPM | HEIGHT: 69 IN | SYSTOLIC BLOOD PRESSURE: 128 MMHG | WEIGHT: 224.5 LBS | DIASTOLIC BLOOD PRESSURE: 82 MMHG | TEMPERATURE: 97.6 F | RESPIRATION RATE: 12 BRPM | BODY MASS INDEX: 33.25 KG/M2

## 2025-05-07 DIAGNOSIS — I50.22 CHRONIC SYSTOLIC CONGESTIVE HEART FAILURE (HCC): ICD-10-CM

## 2025-05-07 DIAGNOSIS — Z00.00 INITIAL MEDICARE ANNUAL WELLNESS VISIT: Primary | ICD-10-CM

## 2025-05-07 DIAGNOSIS — I10 ESSENTIAL HYPERTENSION: ICD-10-CM

## 2025-05-07 DIAGNOSIS — E11.8 TYPE 2 DIABETES MELLITUS WITH COMPLICATION, WITHOUT LONG-TERM CURRENT USE OF INSULIN (HCC): ICD-10-CM

## 2025-05-07 DIAGNOSIS — I47.29 NSVT (NONSUSTAINED VENTRICULAR TACHYCARDIA) (HCC): ICD-10-CM

## 2025-05-07 DIAGNOSIS — Z12.11 COLON CANCER SCREENING: ICD-10-CM

## 2025-05-07 DIAGNOSIS — E78.2 MIXED HYPERLIPIDEMIA: ICD-10-CM

## 2025-05-07 PROCEDURE — 1123F ACP DISCUSS/DSCN MKR DOCD: CPT | Performed by: INTERNAL MEDICINE

## 2025-05-07 PROCEDURE — G0438 PPPS, INITIAL VISIT: HCPCS | Performed by: INTERNAL MEDICINE

## 2025-05-07 PROCEDURE — 3074F SYST BP LT 130 MM HG: CPT | Performed by: INTERNAL MEDICINE

## 2025-05-07 PROCEDURE — 99214 OFFICE O/P EST MOD 30 MIN: CPT | Performed by: INTERNAL MEDICINE

## 2025-05-07 PROCEDURE — 3079F DIAST BP 80-89 MM HG: CPT | Performed by: INTERNAL MEDICINE

## 2025-05-07 NOTE — PROGRESS NOTES
Medicare Annual Wellness Visit    Carlos Saldana is here for Medicare AWV    Assessment & Plan   Initial Medicare annual wellness visit  Type 2 diabetes mellitus with complication, without long-term current use of insulin (HCC)  -      DIABETES FOOT EXAM  -     Comprehensive Metabolic Panel; Future  -     Hemoglobin A1C; Future  Colon cancer screening  -     AFL - Tio Jose MD, Gastroenterology, [place]  NSVT (nonsustained ventricular tachycardia) (HCC)  Essential hypertension  Chronic systolic congestive heart failure (HCC)  Mixed hyperlipidemia  -     Lipid Panel; Future  Unclear diabetes control as he did not bring his glucose log.  If not controlled plan discussed with him was to start Jardiance or Farxiga.  Check labs.  No issues with nonsustained VT, on Coreg, has pacemaker.  Managed by cardiology.  No signs or symptoms of CHF, managed by cardiology.  Check lipids, statin intolerant.  Unclear if he is taking Zetia.  BP at goal, continue current medications.     Return in about 6 months (around 11/7/2025) for HTN, DIABETES.     Subjective   The following acute and/or chronic problems were also addressed today:  No chest pain, sob, dizziness, weakness, lightheadedness  No n/v/abd pain, melena or brbpr  DM - doesn't check glucose regularly - recalls around 140 as highest, no sx of low sugar    Believes he is taking zetia - not sure - cannot tolerate statin.     Patient's complete Health Risk Assessment and screening values have been reviewed and are found in Flowsheets. The following problems were reviewed today and where indicated follow up appointments were made and/or referrals ordered.    Positive Risk Factor Screenings with Interventions:             General HRA Questions:  Select all that apply: (!) (Proxy-Rptd) New or Increased Pain  Interventions - Pain:  Arthritis pain in hands after yard work        Abnormal BMI (obese):  Body mass index is 33.15 kg/m². (!) Abnormal  Interventions:  See

## 2025-05-07 NOTE — PROGRESS NOTES
Have you been to the ER, urgent care clinic since your last visit?  Hospitalized since your last visit?   NO    Have you seen or consulted any other health care providers outside our system since your last visit?   NO      “Have you had a colorectal cancer screening such as a colonoscopy/FIT/Cologuard?    NO order pended    No colonoscopy on file  No cologuard on file  No FIT/FOBT on file   No flexible sigmoidoscopy on file

## 2025-05-08 LAB
ALBUMIN SERPL-MCNC: 4.7 G/DL (ref 3.9–4.9)
ALP SERPL-CCNC: 107 IU/L (ref 44–121)
ALT SERPL-CCNC: 20 IU/L (ref 0–44)
AST SERPL-CCNC: 17 IU/L (ref 0–40)
BILIRUB SERPL-MCNC: 0.4 MG/DL (ref 0–1.2)
BUN SERPL-MCNC: 14 MG/DL (ref 8–27)
BUN/CREAT SERPL: 13 (ref 10–24)
CALCIUM SERPL-MCNC: 9.7 MG/DL (ref 8.6–10.2)
CHLORIDE SERPL-SCNC: 102 MMOL/L (ref 96–106)
CHOLEST SERPL-MCNC: 233 MG/DL (ref 100–199)
CO2 SERPL-SCNC: 23 MMOL/L (ref 20–29)
CREAT SERPL-MCNC: 1.04 MG/DL (ref 0.76–1.27)
EGFRCR SERPLBLD CKD-EPI 2021: 80 ML/MIN/1.73
GLOBULIN SER CALC-MCNC: 2.6 G/DL (ref 1.5–4.5)
GLUCOSE SERPL-MCNC: 120 MG/DL (ref 70–99)
HBA1C MFR BLD: 8.5 % (ref 4.8–5.6)
HDLC SERPL-MCNC: 34 MG/DL
LDLC SERPL CALC-MCNC: 163 MG/DL (ref 0–99)
POTASSIUM SERPL-SCNC: 4.8 MMOL/L (ref 3.5–5.2)
PROT SERPL-MCNC: 7.3 G/DL (ref 6–8.5)
SODIUM SERPL-SCNC: 141 MMOL/L (ref 134–144)
TRIGL SERPL-MCNC: 193 MG/DL (ref 0–149)
VLDLC SERPL CALC-MCNC: 36 MG/DL (ref 5–40)

## 2025-05-09 PROCEDURE — 93297 REM INTERROG DEV EVAL ICPMS: CPT | Performed by: INTERNAL MEDICINE

## 2025-05-28 ENCOUNTER — RESULTS FOLLOW-UP (OUTPATIENT)
Facility: CLINIC | Age: 65
End: 2025-05-28

## 2025-06-14 PROCEDURE — 93297 REM INTERROG DEV EVAL ICPMS: CPT | Performed by: INTERNAL MEDICINE

## 2025-07-02 DIAGNOSIS — I10 ESSENTIAL HYPERTENSION: ICD-10-CM

## 2025-07-02 RX ORDER — LOSARTAN POTASSIUM 100 MG/1
100 TABLET ORAL DAILY
Qty: 90 TABLET | Refills: 1 | Status: SHIPPED | OUTPATIENT
Start: 2025-07-02

## 2025-07-02 NOTE — TELEPHONE ENCOUNTER
Requested Prescriptions     Signed Prescriptions Disp Refills    losartan (COZAAR) 100 MG tablet 90 tablet 1     Sig: TAKE 1 TABLET BY MOUTH EVERY DAY     Authorizing Provider: JACK DUMONT     Ordering User: ESAU JOHNSON     Future Appointments   Date Time Provider Department Center   7/16/2025  9:40 AM Jack Dumont MD [facility]   11/12/2025  9:10 AM Alayna De La Garza MD [facility]   3/4/2026  1:00 PM [facility]   3/4/2026  1:00 PM Meli Cardenas, APRN - CNP [facility]

## 2025-07-16 ENCOUNTER — TELEPHONE (OUTPATIENT)
Age: 65
End: 2025-07-16

## 2025-07-20 PROCEDURE — 93297 REM INTERROG DEV EVAL ICPMS: CPT | Performed by: INTERNAL MEDICINE

## 2025-07-28 RX ORDER — METFORMIN HYDROCHLORIDE 500 MG/1
500 TABLET, EXTENDED RELEASE ORAL 2 TIMES DAILY WITH MEALS
Qty: 180 TABLET | Refills: 2 | Status: SHIPPED | OUTPATIENT
Start: 2025-07-28

## 2025-07-28 RX ORDER — EMPAGLIFLOZIN 10 MG/1
10 TABLET, FILM COATED ORAL DAILY
Qty: 90 TABLET | Refills: 1 | Status: SHIPPED | OUTPATIENT
Start: 2025-07-28

## 2025-07-28 NOTE — TELEPHONE ENCOUNTER
----- Message from Iraida Del Rio DO sent at 6/18/2019  1:24 PM CDT -----  Please let patient know her labs overall were normal. Her HDL was a little low. I recommend diet, exercise and weight loss to help increase her HDL. For her dizziness I recommend proceeding to sleep study. If symptoms are worsening have her follow up with PCP/   Future Appointments:  Future Appointments   Date Time Provider Department Center   9/10/2025 10:00 AM Ron Dumont MD [facility]   11/12/2025  9:10 AM Alayna De La Garza MD [facility]   3/4/2026  1:00 PM [email or web address], [facility]ACQU[facility]   3/4/2026  1:00 PM Meli Cardenas APRN - CNP [facility]        Last Appointment With Me:  5/7/2025     Requested Prescriptions     Pending Prescriptions Disp Refills    metFORMIN (GLUCOPHAGE-XR) 500 MG extended release tablet [Pharmacy Med Name: METFORMIN HCL  MG TABLET] 180 tablet 2     Sig: TAKE 1 TABLET BY MOUTH TWICE A DAY WITH MEALS

## 2025-07-28 NOTE — TELEPHONE ENCOUNTER
Future Appointments:  Future Appointments   Date Time Provider Department Center   9/10/2025 10:00 AM Ron Dumont MD [facility]   11/12/2025  9:10 AM Alayna De La Garza MD [facility]   3/4/2026  1:00 PM [email or web address], [facility]ON[facility] JACQU[facility]   3/4/2026  1:00 PM Meli Cardenas APRN - CNP [facility]        Last Appointment With Me:  5/7/2025     Requested Prescriptions     Pending Prescriptions Disp Refills    JARDIANCE 10 MG tablet [Pharmacy Med Name: JARDIANCE 10 MG TABLET] 90 tablet 1     Sig: TAKE 1 TABLET BY MOUTH EVERY DAY